# Patient Record
Sex: FEMALE | Race: WHITE | NOT HISPANIC OR LATINO | Employment: FULL TIME | URBAN - METROPOLITAN AREA
[De-identification: names, ages, dates, MRNs, and addresses within clinical notes are randomized per-mention and may not be internally consistent; named-entity substitution may affect disease eponyms.]

---

## 2019-08-13 ENCOUNTER — OFFICE VISIT (OUTPATIENT)
Dept: PODIATRY | Facility: CLINIC | Age: 59
End: 2019-08-13
Payer: COMMERCIAL

## 2019-08-13 VITALS
DIASTOLIC BLOOD PRESSURE: 81 MMHG | WEIGHT: 165 LBS | HEIGHT: 65 IN | BODY MASS INDEX: 27.49 KG/M2 | SYSTOLIC BLOOD PRESSURE: 130 MMHG | RESPIRATION RATE: 16 BRPM

## 2019-08-13 DIAGNOSIS — M20.41 HAMMER TOE OF RIGHT FOOT: ICD-10-CM

## 2019-08-13 DIAGNOSIS — Q66.52 CONGENITAL PES PLANUS OF LEFT FOOT: ICD-10-CM

## 2019-08-13 DIAGNOSIS — M79.672 PAIN IN BOTH FEET: ICD-10-CM

## 2019-08-13 DIAGNOSIS — M79.671 PAIN IN BOTH FEET: ICD-10-CM

## 2019-08-13 DIAGNOSIS — M21.969 ACQUIRED DEFORMITY OF FOOT, UNSPECIFIED LATERALITY: Primary | ICD-10-CM

## 2019-08-13 DIAGNOSIS — Q66.51 CONGENITAL PES PLANUS OF RIGHT FOOT: ICD-10-CM

## 2019-08-13 PROBLEM — M20.42 HAMMER TOE OF LEFT FOOT: Status: ACTIVE | Noted: 2019-08-13

## 2019-08-13 PROCEDURE — 20605 DRAIN/INJ JOINT/BURSA W/O US: CPT | Performed by: PODIATRIST

## 2019-08-13 PROCEDURE — L3000 FT INSERT UCB BERKELEY SHELL: HCPCS | Performed by: PODIATRIST

## 2019-08-13 PROCEDURE — 73630 X-RAY EXAM OF FOOT: CPT | Performed by: PODIATRIST

## 2019-08-13 PROCEDURE — 99203 OFFICE O/P NEW LOW 30 MIN: CPT | Performed by: PODIATRIST

## 2019-08-13 RX ORDER — METOPROLOL SUCCINATE 50 MG/1
50 TABLET, EXTENDED RELEASE ORAL DAILY
Refills: 5 | Status: ON HOLD | COMMUNITY
Start: 2019-07-01 | End: 2021-05-28 | Stop reason: SDUPTHER

## 2019-08-13 RX ORDER — MELOXICAM 7.5 MG/1
7.5 TABLET ORAL DAILY
Qty: 10 TABLET | Refills: 0 | Status: SHIPPED | OUTPATIENT
Start: 2019-08-13 | End: 2019-08-23

## 2019-08-13 RX ORDER — AMLODIPINE BESYLATE 5 MG/1
5 TABLET ORAL DAILY
Refills: 2 | Status: ON HOLD | COMMUNITY
Start: 2019-07-01 | End: 2021-05-28 | Stop reason: SDUPTHER

## 2019-08-13 RX ORDER — MELOXICAM 15 MG/1
1 TABLET ORAL DAILY PRN
COMMUNITY
Start: 2016-06-13 | End: 2021-05-28 | Stop reason: HOSPADM

## 2019-08-13 RX ORDER — LOSARTAN POTASSIUM 50 MG/1
50 TABLET ORAL DAILY
Refills: 2 | Status: ON HOLD | COMMUNITY
Start: 2019-07-01 | End: 2021-05-28 | Stop reason: SDUPTHER

## 2019-08-13 RX ORDER — ESCITALOPRAM OXALATE 10 MG/1
TABLET ORAL
COMMUNITY
Start: 2018-05-23 | End: 2021-05-28 | Stop reason: HOSPADM

## 2019-08-13 RX ORDER — LISINOPRIL 2.5 MG/1
TABLET ORAL
COMMUNITY
End: 2021-05-28 | Stop reason: HOSPADM

## 2019-08-13 RX ORDER — ALPRAZOLAM 0.5 MG/1
0.5 TABLET ORAL DAILY PRN
COMMUNITY
Start: 2013-10-28

## 2019-08-13 RX ORDER — FUROSEMIDE 20 MG/1
20 TABLET ORAL DAILY
COMMUNITY
End: 2021-05-28 | Stop reason: HOSPADM

## 2019-08-13 RX ORDER — NITROGLYCERIN 0.4 MG/1
0.4 TABLET SUBLINGUAL
COMMUNITY
Start: 2013-11-20

## 2019-08-13 RX ORDER — GLIMEPIRIDE 4 MG/1
4 TABLET ORAL 2 TIMES DAILY
COMMUNITY
End: 2021-05-28 | Stop reason: HOSPADM

## 2019-08-13 NOTE — PROGRESS NOTES
Assessment/Plan:  Metatarsalgia  Bursitis  Hammertoe formation  Pes planus  Pain upon ambulation  Hallux valgus deformity  Right foot pain    Plan  X-rays taken  Foot exam performed  Right 2nd MPJ arthrocentesis done  1 cc of Kenalog 10 injected into right 2nd MPJ without pain or complication  Patient be placed on Mobic  Patient has had her feet casted for custom molded foot orthotics  No problem-specific Assessment & Plan notes found for this encounter  Diagnoses and all orders for this visit:    Acquired deformity of foot, unspecified laterality  -     meloxicam (MOBIC) 7 5 mg tablet; Take 1 tablet (7 5 mg total) by mouth daily for 10 days    Pain in both feet    Congenital pes planus of right foot    Congenital pes planus of left foot    Hammer toe of right foot    Other orders  -     sitaGLIPtin-metFORMIN (JANUMET)  MG per tablet; Take 1 tablet by mouth  -     metoprolol succinate (TOPROL-XL) 50 mg 24 hr tablet; Take 50 mg by mouth daily  -     meloxicam (MOBIC) 15 mg tablet; Take 1 tablet by mouth daily as needed  -     losartan (COZAAR) 50 mg tablet; Take 50 mg by mouth daily  -     lisinopril (ZESTRIL) 2 5 mg tablet; Take by mouth  -     furosemide (LASIX) 20 mg tablet; Take 20 mg by mouth daily  -     glimepiride (AMARYL) 4 mg tablet; Take 4 mg by mouth 2 (two) times a day  -     ALPRAZolam (XANAX) 0 5 mg tablet; Take 0 5 mg by mouth daily as needed  -     amLODIPine (NORVASC) 5 mg tablet; Take 5 mg by mouth daily  -     aspirin 81 MG tablet; Take 81 mg by mouth daily  -     escitalopram (LEXAPRO) 10 mg tablet; TAKE 1 TABLET BY MOUTH AT BEDTIME  -     sitaGLIPtin (JANUVIA) 50 mg tablet; Take by mouth  -     nitroglycerin (NITROSTAT) 0 4 mg SL tablet; Place 0 4 mg under the tongue          Subjective:  Patient has pain in the ball of her right foot  No history of trauma  It has been ongoing for months  Patient has pain at the end of the day      No past medical history on file     No past surgical history on file  No Known Allergies      Current Outpatient Medications:     ALPRAZolam (XANAX) 0 5 mg tablet, Take 0 5 mg by mouth daily as needed, Disp: , Rfl:     aspirin 81 MG tablet, Take 81 mg by mouth daily, Disp: , Rfl:     escitalopram (LEXAPRO) 10 mg tablet, TAKE 1 TABLET BY MOUTH AT BEDTIME, Disp: , Rfl:     meloxicam (MOBIC) 15 mg tablet, Take 1 tablet by mouth daily as needed, Disp: , Rfl:     nitroglycerin (NITROSTAT) 0 4 mg SL tablet, Place 0 4 mg under the tongue, Disp: , Rfl:     sitaGLIPtin-metFORMIN (JANUMET)  MG per tablet, Take 1 tablet by mouth, Disp: , Rfl:     amLODIPine (NORVASC) 5 mg tablet, Take 5 mg by mouth daily, Disp: , Rfl: 2    furosemide (LASIX) 20 mg tablet, Take 20 mg by mouth daily, Disp: , Rfl:     glimepiride (AMARYL) 4 mg tablet, Take 4 mg by mouth 2 (two) times a day, Disp: , Rfl:     lisinopril (ZESTRIL) 2 5 mg tablet, Take by mouth, Disp: , Rfl:     losartan (COZAAR) 50 mg tablet, Take 50 mg by mouth daily, Disp: , Rfl: 2    meloxicam (MOBIC) 7 5 mg tablet, Take 1 tablet (7 5 mg total) by mouth daily for 10 days, Disp: 10 tablet, Rfl: 0    metoprolol succinate (TOPROL-XL) 50 mg 24 hr tablet, Take 50 mg by mouth daily, Disp: , Rfl: 5    sitaGLIPtin (JANUVIA) 50 mg tablet, Take by mouth, Disp: , Rfl:     Patient Active Problem List   Diagnosis    Acquired deformity of foot    Pain in both feet    Congenital pes planus of right foot    Congenital pes planus of left foot    Hammer toe of left foot          Patient ID: Vaughn Wu is a 61 y o  female  HPI    The following portions of the patient's history were reviewed and updated as appropriate:     family history is not on file  has no tobacco, alcohol, and drug history on file  Vitals:    08/13/19 1619   BP: 130/81   Resp: 16       Review of Systems      Objective:  Patient's shoes and socks removed     Foot Exam    General  General Appearance: appears stated age and healthy   Orientation: alert and oriented to person, place, and time   Affect: appropriate   Gait: antalgic       Right Foot/Ankle     Inspection and Palpation  Ecchymosis: none  Tenderness: great toe interphalangeal joint, great toe metatarsophalangeal joint and lesser metatarsophalangeal joints   Swelling: dorsum and metatarsals   Arch: pes planus  Hammertoes: fifth toe  Hallux valgus: yes  Hallux limitus: yes  Skin Exam: dry skin;     Neurovascular  Dorsalis pedis: 2+  Posterior tibial: 3+      Left Foot/Ankle      Inspection and Palpation  Ecchymosis: none  Tenderness: great toe metatarsophalangeal joint   Swelling: dorsum and metatarsals   Arch: pes planus  Hammertoes: fifth toe  Hallux valgus: yes  Hallux limitus: yes  Skin Exam: dry skin;     Neurovascular  Dorsalis pedis: 2+  Posterior tibial: 3+        Physical Exam   Constitutional: She is oriented to person, place, and time  She appears well-developed and well-nourished  Cardiovascular: Normal rate and regular rhythm  Pulses:       Dorsalis pedis pulses are 2+ on the right side, and 2+ on the left side  Posterior tibial pulses are 3+ on the right side, and 3+ on the left side  Musculoskeletal: She exhibits edema, tenderness and deformity  Patient is maximally pronated in stance and gait  There is hallux valgus deformity bilateral   Pain with palpation 2nd MPJ of the right foot  Possible positive Lauren sign 2nd right intermetatarsal space  X-ray  Subluxing 2nd MPJ  Hallux valgus deformity noted  No evidence of fracture or bony mass  Plantar flexed 2nd metatarsal noted   Feet:   Right Foot:   Skin Integrity: Positive for dry skin  Left Foot:   Skin Integrity: Positive for dry skin  Neurological: She is alert and oriented to person, place, and time  Skin: Skin is warm and dry  Capillary refill takes less than 2 seconds  Psychiatric: She has a normal mood and affect   Her behavior is normal  Judgment and thought content normal    Vitals reviewed          Procedures

## 2019-08-26 ENCOUNTER — OFFICE VISIT (OUTPATIENT)
Dept: PODIATRY | Facility: CLINIC | Age: 59
End: 2019-08-26
Payer: COMMERCIAL

## 2019-08-26 VITALS
DIASTOLIC BLOOD PRESSURE: 82 MMHG | HEIGHT: 65 IN | HEART RATE: 65 BPM | SYSTOLIC BLOOD PRESSURE: 132 MMHG | BODY MASS INDEX: 27.49 KG/M2 | WEIGHT: 165 LBS | RESPIRATION RATE: 16 BRPM

## 2019-08-26 DIAGNOSIS — M20.41 HAMMER TOE OF RIGHT FOOT: ICD-10-CM

## 2019-08-26 DIAGNOSIS — M79.671 RIGHT FOOT PAIN: ICD-10-CM

## 2019-08-26 DIAGNOSIS — M21.969 ACQUIRED DEFORMITY OF FOOT, UNSPECIFIED LATERALITY: ICD-10-CM

## 2019-08-26 DIAGNOSIS — G57.61 MORTON'S NEUROMA OF SECOND INTERSPACE OF RIGHT FOOT: Primary | ICD-10-CM

## 2019-08-26 PROCEDURE — 29540 STRAPPING ANKLE &/FOOT: CPT | Performed by: PODIATRIST

## 2019-08-26 PROCEDURE — 99212 OFFICE O/P EST SF 10 MIN: CPT | Performed by: PODIATRIST

## 2019-08-26 RX ORDER — ETODOLAC 400 MG/1
400 TABLET, FILM COATED ORAL 2 TIMES DAILY
Qty: 60 TABLET | Refills: 0 | Status: SHIPPED | OUTPATIENT
Start: 2019-08-26 | End: 2021-05-25

## 2019-08-26 NOTE — PROGRESS NOTES
Assessment/Plan:  Rule out neuroma right foot  Metatarsalgia  Bursitis  Pain upon ambulation  Plan  Patient declines injection therapy  We will change to Lodine  MRI ordered to rule out neuroma  Right arch bound a low dye arch strapping fashion    No problem-specific Assessment & Plan notes found for this encounter  Diagnoses and all orders for this visit:    Wes's neuroma of second interspace of right foot  -     MRI foot/forefoot toest right wo contrast; Future  -     etodolac (LODINE) 400 MG tablet; Take 1 tablet (400 mg total) by mouth 2 (two) times a day for 30 days    Acquired deformity of foot, unspecified laterality  -     etodolac (LODINE) 400 MG tablet; Take 1 tablet (400 mg total) by mouth 2 (two) times a day for 30 days    Right foot pain  -     etodolac (LODINE) 400 MG tablet; Take 1 tablet (400 mg total) by mouth 2 (two) times a day for 30 days          Subjective:  Patient has continued ongoing pain of the right foot  Patient has pain in around the 2nd 3rd toes of the right foot  No history of trauma    No past medical history on file  No past surgical history on file      No Known Allergies      Current Outpatient Medications:     ALPRAZolam (XANAX) 0 5 mg tablet, Take 0 5 mg by mouth daily as needed, Disp: , Rfl:     amLODIPine (NORVASC) 5 mg tablet, Take 5 mg by mouth daily, Disp: , Rfl: 2    aspirin 81 MG tablet, Take 81 mg by mouth daily, Disp: , Rfl:     escitalopram (LEXAPRO) 10 mg tablet, TAKE 1 TABLET BY MOUTH AT BEDTIME, Disp: , Rfl:     etodolac (LODINE) 400 MG tablet, Take 1 tablet (400 mg total) by mouth 2 (two) times a day for 30 days, Disp: 60 tablet, Rfl: 0    furosemide (LASIX) 20 mg tablet, Take 20 mg by mouth daily, Disp: , Rfl:     glimepiride (AMARYL) 4 mg tablet, Take 4 mg by mouth 2 (two) times a day, Disp: , Rfl:     lisinopril (ZESTRIL) 2 5 mg tablet, Take by mouth, Disp: , Rfl:     losartan (COZAAR) 50 mg tablet, Take 50 mg by mouth daily, Disp: , Rfl: 2    meloxicam (MOBIC) 15 mg tablet, Take 1 tablet by mouth daily as needed, Disp: , Rfl:     meloxicam (MOBIC) 7 5 mg tablet, Take 1 tablet (7 5 mg total) by mouth daily for 10 days, Disp: 10 tablet, Rfl: 0    metoprolol succinate (TOPROL-XL) 50 mg 24 hr tablet, Take 50 mg by mouth daily, Disp: , Rfl: 5    nitroglycerin (NITROSTAT) 0 4 mg SL tablet, Place 0 4 mg under the tongue, Disp: , Rfl:     sitaGLIPtin (JANUVIA) 50 mg tablet, Take by mouth, Disp: , Rfl:     sitaGLIPtin-metFORMIN (JANUMET)  MG per tablet, Take 1 tablet by mouth, Disp: , Rfl:     Patient Active Problem List   Diagnosis    Acquired deformity of foot    Pain in both feet    Congenital pes planus of right foot    Congenital pes planus of left foot    Hammer toe of left foot          Patient ID: Jf Ty is a 61 y o  female  HPI    The following portions of the patient's history were reviewed and updated as appropriate:     family history is not on file  has no tobacco, alcohol, and drug history on file  Vitals:    08/26/19 1624   BP: 132/82   Pulse: 65   Resp: 16       Review of Systems      Objective:  Patient's shoes and socks removed     Foot ExamPhysical Exam       Foot Exam     General  General Appearance: appears stated age and healthy   Orientation: alert and oriented to person, place, and time   Affect: appropriate   Gait: antalgic         Right Foot/Ankle      Inspection and Palpation  Ecchymosis: none  Tenderness: great toe interphalangeal joint, great toe metatarsophalangeal joint and lesser metatarsophalangeal joints   Swelling: dorsum and metatarsals   Arch: pes planus  Hammertoes: fifth toe  Hallux valgus: yes  Hallux limitus: yes  Skin Exam: dry skin;      Neurovascular  Dorsalis pedis: 2+  Posterior tibial: 3+        Left Foot/Ankle       Inspection and Palpation  Ecchymosis: none  Tenderness: great toe metatarsophalangeal joint   Swelling: dorsum and metatarsals   Arch: pes planus  Hammertoes: fifth toe  Hallux valgus: yes  Hallux limitus: yes  Skin Exam: dry skin;      Neurovascular  Dorsalis pedis: 2+  Posterior tibial: 3+           Physical Exam   Constitutional: She is oriented to person, place, and time  She appears well-developed and well-nourished  Cardiovascular: Normal rate and regular rhythm  Pulses:       Dorsalis pedis pulses are 2+ on the right side, and 2+ on the left side  Posterior tibial pulses are 3+ on the right side, and 3+ on the left side  Musculoskeletal: She exhibits edema, tenderness and deformity  Patient is maximally pronated in stance and gait  There is hallux valgus deformity bilateral   Pain with palpation 2nd MPJ of the right foot  Possible positive Lauren sign 2nd right intermetatarsal space  X-ray  Subluxing 2nd MPJ  Hallux valgus deformity noted  No evidence of fracture or bony mass  Plantar flexed 2nd metatarsal noted   Feet:   Right Foot:   Skin Integrity: Positive for dry skin  Left Foot:   Skin Integrity: Positive for dry skin  Neurological: She is alert and oriented to person, place, and time  Skin: Skin is warm and dry  Capillary refill takes less than 2 seconds  Psychiatric: She has a normal mood and affect  Her behavior is normal  Judgment and thought content normal    Vitals reviewed              Procedures

## 2019-09-13 ENCOUNTER — OFFICE VISIT (OUTPATIENT)
Dept: PODIATRY | Facility: CLINIC | Age: 59
End: 2019-09-13
Payer: COMMERCIAL

## 2019-09-13 VITALS
DIASTOLIC BLOOD PRESSURE: 82 MMHG | HEIGHT: 65 IN | WEIGHT: 165 LBS | HEART RATE: 73 BPM | RESPIRATION RATE: 17 BRPM | SYSTOLIC BLOOD PRESSURE: 138 MMHG | BODY MASS INDEX: 27.49 KG/M2

## 2019-09-13 DIAGNOSIS — G57.61 MORTON'S NEUROMA OF SECOND INTERSPACE OF RIGHT FOOT: Primary | ICD-10-CM

## 2019-09-13 DIAGNOSIS — M79.671 RIGHT FOOT PAIN: ICD-10-CM

## 2019-09-13 DIAGNOSIS — M21.969 ACQUIRED DEFORMITY OF FOOT, UNSPECIFIED LATERALITY: ICD-10-CM

## 2019-09-13 DIAGNOSIS — M20.41 HAMMER TOE OF RIGHT FOOT: ICD-10-CM

## 2019-09-13 PROCEDURE — 99212 OFFICE O/P EST SF 10 MIN: CPT | Performed by: PODIATRIST

## 2019-09-13 PROCEDURE — 64455 NJX AA&/STRD PLTR COM DG NRV: CPT | Performed by: PODIATRIST

## 2019-09-13 PROCEDURE — 73620 X-RAY EXAM OF FOOT: CPT | Performed by: PODIATRIST

## 2019-09-13 RX ORDER — ETODOLAC 400 MG/1
400 TABLET, FILM COATED ORAL 2 TIMES DAILY
Qty: 20 TABLET | Refills: 0 | Status: SHIPPED | OUTPATIENT
Start: 2019-09-13 | End: 2019-09-23

## 2019-09-13 NOTE — PROGRESS NOTES
Assessment/Plan:  Rule out neuroma right foot  Metatarsalgia  Bursitis  Pain upon ambulation        Plan  Today diagnostic nerve block performed  2 cc 2 percent lidocaine 1 cc Kenalog 10 injected into right 2nd intermetatarsal space without pain or complication  We will change to Lodine  MRI ordered to rule out neuroma  Right arch bound a low dye arch strapping fashion     No problem-specific Assessment & Plan notes found for this encounter          Diagnoses and all orders for this visit:     Harvey's neuroma of second interspace of right foot  -     MRI foot/forefoot toest right wo contrast; Future  -     etodolac (LODINE) 400 MG tablet; Take 1 tablet (400 mg total) by mouth 2 (two) times a day for 30 days     Acquired deformity of foot, unspecified laterality  -     etodolac (LODINE) 400 MG tablet; Take 1 tablet (400 mg total) by mouth 2 (two) times a day for 30 days     Right foot pain  -     etodolac (LODINE) 400 MG tablet; Take 1 tablet (400 mg total) by mouth 2 (two) times a day for 30 days            Subjective:  Patient has continued ongoing pain of the right foot  Patient has pain in around the 2nd 3rd toes of the right foot    No history of trauma     No past medical history on file      No past surgical history on file      No Known Allergies        Current Outpatient Medications:     ALPRAZolam (XANAX) 0 5 mg tablet, Take 0 5 mg by mouth daily as needed, Disp: , Rfl:     amLODIPine (NORVASC) 5 mg tablet, Take 5 mg by mouth daily, Disp: , Rfl: 2    aspirin 81 MG tablet, Take 81 mg by mouth daily, Disp: , Rfl:     escitalopram (LEXAPRO) 10 mg tablet, TAKE 1 TABLET BY MOUTH AT BEDTIME, Disp: , Rfl:     etodolac (LODINE) 400 MG tablet, Take 1 tablet (400 mg total) by mouth 2 (two) times a day for 30 days, Disp: 60 tablet, Rfl: 0    furosemide (LASIX) 20 mg tablet, Take 20 mg by mouth daily, Disp: , Rfl:     glimepiride (AMARYL) 4 mg tablet, Take 4 mg by mouth 2 (two) times a day, Disp: , Rfl:     lisinopril (ZESTRIL) 2 5 mg tablet, Take by mouth, Disp: , Rfl:     losartan (COZAAR) 50 mg tablet, Take 50 mg by mouth daily, Disp: , Rfl: 2    meloxicam (MOBIC) 15 mg tablet, Take 1 tablet by mouth daily as needed, Disp: , Rfl:     meloxicam (MOBIC) 7 5 mg tablet, Take 1 tablet (7 5 mg total) by mouth daily for 10 days, Disp: 10 tablet, Rfl: 0    metoprolol succinate (TOPROL-XL) 50 mg 24 hr tablet, Take 50 mg by mouth daily, Disp: , Rfl: 5    nitroglycerin (NITROSTAT) 0 4 mg SL tablet, Place 0 4 mg under the tongue, Disp: , Rfl:     sitaGLIPtin (JANUVIA) 50 mg tablet, Take by mouth, Disp: , Rfl:     sitaGLIPtin-metFORMIN (JANUMET)  MG per tablet, Take 1 tablet by mouth, Disp: , Rfl:          Patient Active Problem List   Diagnosis    Acquired deformity of foot    Pain in both feet    Congenital pes planus of right foot    Congenital pes planus of left foot    Hammer toe of left foot             Patient ID: Kami Ragland is a 61 y o  female      HPI     The following portions of the patient's history were reviewed and updated as appropriate:      family history is not on file        has no tobacco, alcohol, and drug history on file          Vitals:     08/26/19 1624   BP: 132/82   Pulse: 65   Resp: 16         Review of Systems       Objective:  Patient's shoes and socks removed     Foot ExamPhysical Exam        Foot Exam     General  General Appearance: appears stated age and healthy   Orientation: alert and oriented to person, place, and time   Affect: appropriate   Gait: antalgic         Right Foot/Ankle      Inspection and Palpation  Ecchymosis: none  Tenderness: great toe interphalangeal joint, great toe metatarsophalangeal joint and lesser metatarsophalangeal joints   Swelling: dorsum and metatarsals   Arch: pes planus  Hammertoes: fifth toe  Hallux valgus: yes  Hallux limitus: yes  Skin Exam: dry skin;      Neurovascular  Dorsalis pedis: 2+  Posterior tibial: 3+        Left Foot/Ankle       Inspection and Palpation  Ecchymosis: none  Tenderness: great toe metatarsophalangeal joint   Swelling: dorsum and metatarsals   Arch: pes planus  Hammertoes: fifth toe  Hallux valgus: yes  Hallux limitus: yes  Skin Exam: dry skin;      Neurovascular  Dorsalis pedis: 2+  Posterior tibial: 3+           Physical Exam   Constitutional: She is oriented to person, place, and time  She appears well-developed and well-nourished  Cardiovascular: Normal rate and regular rhythm  Pulses:       Dorsalis pedis pulses are 2+ on the right side, and 2+ on the left side         Posterior tibial pulses are 3+ on the right side, and 3+ on the left side  Musculoskeletal: She exhibits edema, tenderness and deformity    Patient is maximally pronated in stance and gait   There is hallux valgus deformity bilateral   Pain with palpation 2nd MPJ of the right foot   Possible positive Lauren sign 2nd right intermetatarsal space  X-ray   Subluxing 2nd MPJ   Hallux valgus deformity noted   No evidence of fracture or bony mass   Plantar flexed 2nd metatarsal noted   Feet:   Right Foot:   Skin Integrity: Positive for dry skin  Left Foot:   Skin Integrity: Positive for dry skin  Neurological: She is alert and oriented to person, place, and time  Skin: Skin is warm and dry  Capillary refill takes less than 2 seconds  Psychiatric: She has a normal mood and affect   Her behavior is normal  Judgment and thought content normal    Vitals reviewed

## 2021-05-25 ENCOUNTER — HOSPITAL ENCOUNTER (INPATIENT)
Facility: HOSPITAL | Age: 61
LOS: 3 days | Discharge: HOME | DRG: 305 | End: 2021-05-28
Attending: EMERGENCY MEDICINE | Admitting: INTERNAL MEDICINE
Payer: COMMERCIAL

## 2021-05-25 ENCOUNTER — APPOINTMENT (EMERGENCY)
Dept: CT IMAGING | Facility: HOSPITAL | Age: 61
DRG: 305 | End: 2021-05-25
Payer: COMMERCIAL

## 2021-05-25 DIAGNOSIS — R47.89 WORD FINDING DIFFICULTY: ICD-10-CM

## 2021-05-25 DIAGNOSIS — R73.9 HYPERGLYCEMIA: ICD-10-CM

## 2021-05-25 DIAGNOSIS — I10 HTN (HYPERTENSION): ICD-10-CM

## 2021-05-25 DIAGNOSIS — R42 DIZZINESS: ICD-10-CM

## 2021-05-25 DIAGNOSIS — E11.65 UNCONTROLLED TYPE 2 DIABETES MELLITUS WITH HYPERGLYCEMIA (HCC): Primary | ICD-10-CM

## 2021-05-25 DIAGNOSIS — I47.2 NONSUSTAINED PAROXYSMAL VENTRICULAR TACHYCARDIA (HCC): ICD-10-CM

## 2021-05-25 DIAGNOSIS — I16.0 HYPERTENSIVE URGENCY: ICD-10-CM

## 2021-05-25 DIAGNOSIS — R94.31 PROLONGED Q-T INTERVAL ON ECG: ICD-10-CM

## 2021-05-25 PROBLEM — IMO0002 UNCONTROLLED TYPE 2 DIABETES MELLITUS: Status: ACTIVE | Noted: 2021-05-25

## 2021-05-25 PROBLEM — E87.1 HYPONATREMIA: Status: ACTIVE | Noted: 2021-05-25

## 2021-05-25 LAB
ALBUMIN SERPL BCP-MCNC: 3.9 G/DL (ref 3.5–5)
ALP SERPL-CCNC: 145 U/L (ref 46–116)
ALT SERPL W P-5'-P-CCNC: 35 U/L (ref 12–78)
ANION GAP SERPL CALCULATED.3IONS-SCNC: 15 MMOL/L (ref 4–13)
ANION GAP SERPL CALCULATED.3IONS-SCNC: 9 MMOL/L (ref 4–13)
AST SERPL W P-5'-P-CCNC: 27 U/L (ref 5–45)
BACTERIA UR QL AUTO: NORMAL /HPF
BASOPHILS # BLD AUTO: 0.02 THOUSANDS/ΜL (ref 0–0.1)
BASOPHILS NFR BLD AUTO: 0 % (ref 0–1)
BILIRUB SERPL-MCNC: 0.52 MG/DL (ref 0.2–1)
BILIRUB UR QL STRIP: NEGATIVE
BUN SERPL-MCNC: 16 MG/DL (ref 5–25)
BUN SERPL-MCNC: 20 MG/DL (ref 5–25)
CALCIUM SERPL-MCNC: 8.7 MG/DL (ref 8.3–10.1)
CALCIUM SERPL-MCNC: 9.2 MG/DL (ref 8.3–10.1)
CHLORIDE SERPL-SCNC: 102 MMOL/L (ref 100–108)
CHLORIDE SERPL-SCNC: 94 MMOL/L (ref 100–108)
CLARITY UR: CLEAR
CO2 SERPL-SCNC: 24 MMOL/L (ref 21–32)
CO2 SERPL-SCNC: 24 MMOL/L (ref 21–32)
COLOR UR: YELLOW
CREAT SERPL-MCNC: 1 MG/DL (ref 0.6–1.3)
CREAT SERPL-MCNC: 1.12 MG/DL (ref 0.6–1.3)
EOSINOPHIL # BLD AUTO: 0.1 THOUSAND/ΜL (ref 0–0.61)
EOSINOPHIL NFR BLD AUTO: 1 % (ref 0–6)
ERYTHROCYTE [DISTWIDTH] IN BLOOD BY AUTOMATED COUNT: 12.6 % (ref 11.6–15.1)
GFR SERPL CREATININE-BSD FRML MDRD: 53 ML/MIN/1.73SQ M
GFR SERPL CREATININE-BSD FRML MDRD: 61 ML/MIN/1.73SQ M
GLUCOSE SERPL-MCNC: 311 MG/DL (ref 65–140)
GLUCOSE SERPL-MCNC: 373 MG/DL (ref 65–140)
GLUCOSE SERPL-MCNC: 534 MG/DL (ref 65–140)
GLUCOSE UR STRIP-MCNC: ABNORMAL MG/DL
HCT VFR BLD AUTO: 47.3 % (ref 34.8–46.1)
HGB BLD-MCNC: 16.1 G/DL (ref 11.5–15.4)
HGB UR QL STRIP.AUTO: NEGATIVE
IMM GRANULOCYTES # BLD AUTO: 0.04 THOUSAND/UL (ref 0–0.2)
IMM GRANULOCYTES NFR BLD AUTO: 1 % (ref 0–2)
KETONES UR STRIP-MCNC: NEGATIVE MG/DL
LEUKOCYTE ESTERASE UR QL STRIP: NEGATIVE
LYMPHOCYTES # BLD AUTO: 2.08 THOUSANDS/ΜL (ref 0.6–4.47)
LYMPHOCYTES NFR BLD AUTO: 27 % (ref 14–44)
MCH RBC QN AUTO: 29.9 PG (ref 26.8–34.3)
MCHC RBC AUTO-ENTMCNC: 34 G/DL (ref 31.4–37.4)
MCV RBC AUTO: 88 FL (ref 82–98)
MONOCYTES # BLD AUTO: 0.41 THOUSAND/ΜL (ref 0.17–1.22)
MONOCYTES NFR BLD AUTO: 5 % (ref 4–12)
NEUTROPHILS # BLD AUTO: 5.18 THOUSANDS/ΜL (ref 1.85–7.62)
NEUTS SEG NFR BLD AUTO: 66 % (ref 43–75)
NITRITE UR QL STRIP: NEGATIVE
NON-SQ EPI CELLS URNS QL MICRO: NORMAL /HPF
NRBC BLD AUTO-RTO: 0 /100 WBCS
PH UR STRIP.AUTO: 6 [PH] (ref 4.5–8)
PLATELET # BLD AUTO: 197 THOUSANDS/UL (ref 149–390)
PLATELET # BLD AUTO: 238 THOUSANDS/UL (ref 149–390)
PMV BLD AUTO: 10.1 FL (ref 8.9–12.7)
PMV BLD AUTO: 10.5 FL (ref 8.9–12.7)
POTASSIUM SERPL-SCNC: 3.5 MMOL/L (ref 3.5–5.3)
POTASSIUM SERPL-SCNC: 4.1 MMOL/L (ref 3.5–5.3)
PROT SERPL-MCNC: 8.3 G/DL (ref 6.4–8.2)
PROT UR STRIP-MCNC: ABNORMAL MG/DL
RBC # BLD AUTO: 5.39 MILLION/UL (ref 3.81–5.12)
RBC #/AREA URNS AUTO: NORMAL /HPF
SODIUM SERPL-SCNC: 133 MMOL/L (ref 136–145)
SODIUM SERPL-SCNC: 135 MMOL/L (ref 136–145)
SP GR UR STRIP.AUTO: 1.01 (ref 1–1.03)
TROPONIN I SERPL-MCNC: <0.02 NG/ML
UROBILINOGEN UR QL STRIP.AUTO: 0.2 E.U./DL
WBC # BLD AUTO: 7.83 THOUSAND/UL (ref 4.31–10.16)
WBC #/AREA URNS AUTO: NORMAL /HPF

## 2021-05-25 PROCEDURE — 99223 1ST HOSP IP/OBS HIGH 75: CPT | Performed by: INTERNAL MEDICINE

## 2021-05-25 PROCEDURE — 82948 REAGENT STRIP/BLOOD GLUCOSE: CPT

## 2021-05-25 PROCEDURE — 99285 EMERGENCY DEPT VISIT HI MDM: CPT | Performed by: EMERGENCY MEDICINE

## 2021-05-25 PROCEDURE — 70496 CT ANGIOGRAPHY HEAD: CPT

## 2021-05-25 PROCEDURE — 99285 EMERGENCY DEPT VISIT HI MDM: CPT

## 2021-05-25 PROCEDURE — 83605 ASSAY OF LACTIC ACID: CPT | Performed by: PHYSICIAN ASSISTANT

## 2021-05-25 PROCEDURE — 70498 CT ANGIOGRAPHY NECK: CPT

## 2021-05-25 PROCEDURE — G1004 CDSM NDSC: HCPCS

## 2021-05-25 PROCEDURE — 80048 BASIC METABOLIC PNL TOTAL CA: CPT | Performed by: PHYSICIAN ASSISTANT

## 2021-05-25 PROCEDURE — 80053 COMPREHEN METABOLIC PANEL: CPT | Performed by: EMERGENCY MEDICINE

## 2021-05-25 PROCEDURE — 93005 ELECTROCARDIOGRAM TRACING: CPT

## 2021-05-25 PROCEDURE — 81001 URINALYSIS AUTO W/SCOPE: CPT

## 2021-05-25 PROCEDURE — 85025 COMPLETE CBC W/AUTO DIFF WBC: CPT | Performed by: EMERGENCY MEDICINE

## 2021-05-25 PROCEDURE — 85049 AUTOMATED PLATELET COUNT: CPT | Performed by: PHYSICIAN ASSISTANT

## 2021-05-25 PROCEDURE — 36415 COLL VENOUS BLD VENIPUNCTURE: CPT | Performed by: EMERGENCY MEDICINE

## 2021-05-25 PROCEDURE — 84484 ASSAY OF TROPONIN QUANT: CPT | Performed by: EMERGENCY MEDICINE

## 2021-05-25 RX ORDER — ACETAMINOPHEN 325 MG/1
650 TABLET ORAL EVERY 4 HOURS PRN
Status: DISCONTINUED | OUTPATIENT
Start: 2021-05-25 | End: 2021-05-28 | Stop reason: HOSPADM

## 2021-05-25 RX ORDER — METOPROLOL SUCCINATE 50 MG/1
50 TABLET, EXTENDED RELEASE ORAL ONCE
Status: COMPLETED | OUTPATIENT
Start: 2021-05-25 | End: 2021-05-25

## 2021-05-25 RX ORDER — LOSARTAN POTASSIUM 50 MG/1
50 TABLET ORAL ONCE
Status: COMPLETED | OUTPATIENT
Start: 2021-05-25 | End: 2021-05-25

## 2021-05-25 RX ORDER — ASPIRIN 81 MG/1
81 TABLET, CHEWABLE ORAL DAILY
Status: DISCONTINUED | OUTPATIENT
Start: 2021-05-26 | End: 2021-05-28 | Stop reason: HOSPADM

## 2021-05-25 RX ORDER — AMLODIPINE BESYLATE 5 MG/1
5 TABLET ORAL ONCE
Status: COMPLETED | OUTPATIENT
Start: 2021-05-25 | End: 2021-05-25

## 2021-05-25 RX ORDER — ATORVASTATIN CALCIUM 40 MG/1
40 TABLET, FILM COATED ORAL EVERY EVENING
Status: DISCONTINUED | OUTPATIENT
Start: 2021-05-25 | End: 2021-05-26

## 2021-05-25 RX ADMIN — LOSARTAN POTASSIUM 50 MG: 50 TABLET, FILM COATED ORAL at 18:25

## 2021-05-25 RX ADMIN — INSULIN LISPRO 3 UNITS: 100 INJECTION, SOLUTION INTRAVENOUS; SUBCUTANEOUS at 22:51

## 2021-05-25 RX ADMIN — ATORVASTATIN CALCIUM 40 MG: 40 TABLET, FILM COATED ORAL at 22:43

## 2021-05-25 RX ADMIN — AMLODIPINE BESYLATE 5 MG: 5 TABLET ORAL at 18:26

## 2021-05-25 RX ADMIN — IOHEXOL 85 ML: 350 INJECTION, SOLUTION INTRAVENOUS at 20:07

## 2021-05-25 RX ADMIN — SODIUM CHLORIDE 1000 ML: 0.9 INJECTION, SOLUTION INTRAVENOUS at 21:06

## 2021-05-25 RX ADMIN — INSULIN HUMAN 10 UNITS: 100 INJECTION, SOLUTION PARENTERAL at 21:02

## 2021-05-25 RX ADMIN — METOPROLOL SUCCINATE 50 MG: 50 TABLET, EXTENDED RELEASE ORAL at 18:26

## 2021-05-25 NOTE — ED PROVIDER NOTES
History  Chief Complaint   Patient presents with    Dizziness     PT presents w/dizziness starting today approx 13:30  thought process felt strange     HPI     60-year-old female presenting for evaluation of dizziness and word-finding difficulty  Patient was at work at around 1:30 p m  When she began to feel dizzy which she describes as feeling lightheaded  Denies sensation of the room spinning  Accompanied by difficulty speaking  Reports that she was able to hear her coworkers talking to her and understand with a worsening, but could not find the words to respond  Her son denies hearing any slurred speech at the time  No facial droop, focal weakness, or sensory deficits  No history of stroke or TIA  No headache  She with the exception of lightheadedness, denies difficulty walking  Of note, patient has a history of hypertension and is on 4 antihypertensive agents  Reports that she has been out of all of her blood pressure medication and her diabetes medicine for the last week  Prior to Admission Medications   Prescriptions Last Dose Informant Patient Reported? Taking?    ALPRAZolam (XANAX) 0 5 mg tablet Past Week at Unknown time  Yes Yes   Sig: Take 0 5 mg by mouth daily as needed   amLODIPine (NORVASC) 5 mg tablet Past Week at Unknown time  Yes Yes   Sig: Take 5 mg by mouth daily   aspirin 81 MG tablet Past Week at Unknown time  Yes Yes   Sig: Take 81 mg by mouth daily   escitalopram (LEXAPRO) 10 mg tablet Past Week at Unknown time  Yes Yes   Sig: TAKE 1 TABLET BY MOUTH AT BEDTIME   furosemide (LASIX) 20 mg tablet Not Taking at Unknown time  Yes No   Sig: Take 20 mg by mouth daily   glimepiride (AMARYL) 4 mg tablet Past Week at Unknown time  Yes Yes   Sig: Take 4 mg by mouth 2 (two) times a day   lisinopril (ZESTRIL) 2 5 mg tablet Past Week at Unknown time  Yes Yes   Sig: Take by mouth   losartan (COZAAR) 50 mg tablet Past Week at Unknown time  Yes Yes   Sig: Take 50 mg by mouth daily   meloxicam (MOBIC) 15 mg tablet Not Taking at Unknown time  Yes No   Sig: Take 1 tablet by mouth daily as needed   metoprolol succinate (TOPROL-XL) 50 mg 24 hr tablet Past Week at Unknown time  Yes Yes   Sig: Take 50 mg by mouth daily   nitroglycerin (NITROSTAT) 0 4 mg SL tablet Past Week at Unknown time  Yes Yes   Sig: Place 0 4 mg under the tongue   sitaGLIPtin (JANUVIA) 50 mg tablet Past Week at Unknown time  Yes Yes   Sig: Take by mouth   sitaGLIPtin-metFORMIN (JANUMET)  MG per tablet Past Week at Unknown time  Yes Yes   Sig: Take 1 tablet by mouth      Facility-Administered Medications: None       Past Medical History:   Diagnosis Date    Diabetes mellitus ([facility] [address] )     Heart failure ([facility] )     Hypertension        History reviewed  No pertinent surgical history  History reviewed  No pertinent family history  I have reviewed and agree with the history as documented  E-Cigarette/Vaping     E-Cigarette/Vaping Substances     Social History     Tobacco Use    Smoking status: Former Smoker    Smokeless tobacco: Former User   Substance Use Topics    Alcohol use: Yes    Drug use: Never       Review of Systems   Constitutional: Negative for chills and fever  HENT: Negative for congestion  Eyes: Negative for visual disturbance  Respiratory: Negative for cough and shortness of breath  Cardiovascular: Negative for chest pain and leg swelling  Gastrointestinal: Negative for abdominal pain, diarrhea, nausea and vomiting  Genitourinary: Negative for dysuria and frequency  Musculoskeletal: Negative for arthralgias, back pain, neck pain and neck stiffness  Skin: Negative for rash  Neurological: Positive for light-headedness  Negative for weakness, numbness and headaches  Word-finding difficulty, resolved   Psychiatric/Behavioral: Negative for agitation, behavioral problems and confusion  Physical Exam  Physical Exam  Constitutional:       General: She is not in acute distress  Appearance: She is well-developed  She is not diaphoretic  HENT:      Head: Normocephalic and atraumatic  Right Ear: External ear normal       Left Ear: External ear normal       Nose: Nose normal    Eyes:      Extraocular Movements: Extraocular movements intact  Conjunctiva/sclera: Conjunctivae normal       Pupils: Pupils are equal, round, and reactive to light  Neck:      Musculoskeletal: Normal range of motion and neck supple  Cardiovascular:      Rate and Rhythm: Normal rate and regular rhythm  Pulses: Normal pulses  Heart sounds: Normal heart sounds  No murmur  No friction rub  No gallop  Pulmonary:      Effort: Pulmonary effort is normal  No respiratory distress  Breath sounds: Normal breath sounds  No wheezing or rales  Abdominal:      General: Bowel sounds are normal  There is no distension  Palpations: Abdomen is soft  Tenderness: There is no abdominal tenderness  There is no guarding  Musculoskeletal: Normal range of motion  General: No deformity  Right lower leg: No edema  Left lower leg: No edema  Skin:     General: Skin is warm and dry  Neurological:      Mental Status: She is alert and oriented to person, place, and time  Motor: No abnormal muscle tone  Comments: Face symmetric, tongue midline, 5/5 strength in the proximal and distal upper and lower extremities bilaterally with intact sensation to light touch throughout  CN II-XII intact  Normal finger-to-nose, rapid alternating movements, and heel-to-shin bilaterally  Normal speech, normal gait  No pronator drift        Psychiatric:         Mood and Affect: Mood normal          Vital Signs  ED Triage Vitals   Temperature Pulse Respirations Blood Pressure SpO2   05/25/21 1650 05/25/21 1650 05/25/21 1650 05/25/21 1650 05/25/21 1650   97 8 °F (36 6 °C) 97 18 (!) 191/117 98 %      Temp Source Heart Rate Source Patient Position - Orthostatic VS BP Location FiO2 (%)   05/25/21 1650 05/25/21 1900 05/25/21 1650 05/25/21 1650 --   Oral Monitor Lying Left arm       Pain Score       05/25/21 2020       No Pain           Vitals:    05/25/21 2212 05/25/21 2213 05/25/21 2300 05/26/21 0000   BP: 135/73 134/77 119/67 92/52   Pulse: 71 69 71 70   Patient Position - Orthostatic VS: Sitting Standing Lying Lying         Visual Acuity      ED Medications  Medications   aspirin chewable tablet 81 mg (has no administration in time range)   acetaminophen (TYLENOL) tablet 650 mg (has no administration in time range)   atorvastatin (LIPITOR) tablet 40 mg (40 mg Oral Given 5/25/21 2243)   enoxaparin (LOVENOX) subcutaneous injection 40 mg (has no administration in time range)   insulin lispro (HumaLOG) 100 units/mL subcutaneous injection 1-5 Units (has no administration in time range)   insulin lispro (HumaLOG) 100 units/mL subcutaneous injection 1-5 Units (3 Units Subcutaneous Given 5/25/21 2251)   metoprolol succinate (TOPROL-XL) 24 hr tablet 50 mg (50 mg Oral Given 5/25/21 1826)   amLODIPine (NORVASC) tablet 5 mg (5 mg Oral Given 5/25/21 1826)   losartan (COZAAR) tablet 50 mg (50 mg Oral Given 5/25/21 1825)   iohexol (OMNIPAQUE) 350 MG/ML injection (SINGLE-DOSE) 85 mL (85 mL Intravenous Given 5/25/21 2007)   insulin regular (HumuLIN R,NovoLIN R) injection 10 Units (10 Units Subcutaneous Given 5/25/21 2102)   sodium chloride 0 9 % bolus 1,000 mL (1,000 mL Intravenous New Bag 5/25/21 2106)       Diagnostic Studies  Results Reviewed     Procedure Component Value Units Date/Time    Comprehensive metabolic panel [[id number]]  (Abnormal) Collected: 05/25/21 1818    Lab Status: Final result Specimen: Blood from Arm, Right Updated: 05/25/21 1951     Sodium 133 mmol/L      Potassium 4 1 mmol/L      Chloride 94 mmol/L      CO2 24 mmol/L      ANION GAP 15 mmol/L      BUN 20 mg/dL      Creatinine 1 12 mg/dL      Glucose 534 mg/dL      Calcium 9 2 mg/dL      AST 27 U/L      ALT 35 U/L      Alkaline Phosphatase 145 U/L Total Protein 8 3 g/dL      Albumin 3 9 g/dL      Total Bilirubin 0 52 mg/dL      eGFR 53 ml/min/1 73sq m     Narrative:      [place] guidelines for Chronic Kidney Disease (CKD):     Stage 1 with normal or high GFR (GFR > 90 mL/min/1 73 square meters)    Stage 2 Mild CKD (GFR = 60-89 mL/min/1 73 square meters)    Stage 3A Moderate CKD (GFR = 45-59 mL/min/1 73 square meters)    Stage 3B Moderate CKD (GFR = 30-44 mL/min/1 73 square meters)    Stage 4 Severe CKD (GFR = 15-29 mL/min/1 73 square meters)    Stage 5 End Stage CKD (GFR <15 mL/min/1 73 square meters)  Note: GFR calculation is accurate only with a steady state creatinine    Urine Microscopic [[id number]]  (Normal) Collected: 05/25/21 1818    Lab Status: Final result Specimen: Urine, Clean Catch Updated: 05/25/21 1910     RBC, UA None Seen /hpf      WBC, UA 0-1 /hpf      Epithelial Cells Occasional /hpf      Bacteria, UA None Seen /hpf     Troponin I [[id number]]  (Normal) Collected: 05/25/21 1818    Lab Status: Final result Specimen: Blood from Arm, Right Updated: 05/25/21 1855     Troponin I <0 02 ng/mL     CBC and differential [[id number]]  (Abnormal) Collected: 05/25/21 1818    Lab Status: Final result Specimen: Blood from Arm, Right Updated: 05/25/21 1830     WBC 7 83 Thousand/uL      RBC 5 39 Million/uL      Hemoglobin 16 1 g/dL      Hematocrit 47 3 %      MCV 88 fL      MCH 29 9 pg      MCHC 34 0 g/dL      RDW 12 6 %      MPV 10 5 fL      Platelets 502 Thousands/uL      nRBC 0 /100 WBCs      Neutrophils Relative 66 %      Immat GRANS % 1 %      Lymphocytes Relative 27 %      Monocytes Relative 5 %      Eosinophils Relative 1 %      Basophils Relative 0 %      Neutrophils Absolute 5 18 Thousands/µL      Immature Grans Absolute 0 04 Thousand/uL      Lymphocytes Absolute 2 08 Thousands/µL      Monocytes Absolute 0 41 Thousand/µL      Eosinophils Absolute 0 10 Thousand/µL      Basophils Absolute 0 02 Thousands/µL     Urine Macroscopic, POC [[id number]]  (Abnormal) Collected: 05/25/21 1818    Lab Status: Final result Specimen: Urine Updated: 05/25/21 1820     Color, UA Yellow     Clarity, UA Clear     pH, UA 6 0     Leukocytes, UA Negative     Nitrite, UA Negative     Protein, UA Trace mg/dl      Glucose,  (1/2%) mg/dl      Ketones, UA Negative mg/dl      Urobilinogen, UA 0 2 E U /dl      Bilirubin, UA Negative     Blood, UA Negative     Specific Gravity, UA 1 010    Narrative:      CLINITEK RESULT                 CTA head and neck with and without contrast   Final Result by Karine 6, DO (05/25 2030)      Normal CT of the brain  Normal CTA of the neck and brain  Workstation performed: [id number]                    Procedures  Procedures         ED Course                             SBIRT 20yo+      Most Recent Value   SBIRT (22 yo +)   In order to provide better care to our patients, we are screening all of our patients for alcohol and drug use  Would it be okay to ask you these screening questions? Yes Filed at: 05/25/2021 2022   Initial Alcohol Screen: US AUDIT-C    1  How often do you have a drink containing alcohol? 1 Filed at: 05/25/2021 2022   2  How many drinks containing alcohol do you have on a typical day you are drinking? 0 Filed at: 05/25/2021 2022   3a  Male UNDER 65: How often do you have five or more drinks on one occasion? 0 Filed at: 05/25/2021 2022   3b  FEMALE Any Age, or MALE 65+: How often do you have 4 or more drinks on one occassion? 0 Filed at: 05/25/2021 2022   Audit-C Score  1 Filed at: 05/25/2021 2022   JODEE: How many times in the past year have you    Used an illegal drug or used a prescription medication for non-medical reasons?   Never Filed at: 05/25/2021 2022                    MDM  Number of Diagnoses or Management Options  Dizziness: new and requires workup  HTN (hypertension): new and requires workup  Hyperglycemia: new and requires workup  Diagnosis management comments: Afebrile and hemodynamically stable  NIH stroke scale 0 on arrival with normal neurologic exam as above  Hypertensive to 191/117  Reports that she has not taken any of her antihypertensive agents over the last week  She was given her home dose of metoprolol Norvasc with improvement in blood pressure to 733 systolic prior to admission  Given report of word-finding difficulty, CTA obtained of the head and neck with no acute abnormalities  Blood glucose elevated to 534  Bicarb normal, no ketones in the urine, doubt DKA  10 mg of subcutaneous insulin given with 1 L of normal saline  I personally interpreted the patient's EKG which reveals normal rate, sinus rhythm with occasional PVCs, normal axis, prolonged QTc to 490 ms, no acute ischemic changes, no prior available for comparison  Troponin obtained in the setting of risk stratification which is undetectable, patient denies chest pain  Episode of word-finding difficulty and lightheadedness possibly secondary to hypertensive crisis vs TIA vs hyperglycemia  Will admit to medicine for further management          Amount and/or Complexity of Data Reviewed  Clinical lab tests: ordered and reviewed  Tests in the radiology section of CPT®: ordered and reviewed  Review and summarize past medical records: yes  Discuss the patient with other providers: yes  Independent visualization of images, tracings, or specimens: yes    Patient Progress  Patient progress: improved           Disposition  Final diagnoses:   Dizziness   Hyperglycemia   HTN (hypertension)   Prolonged Q-T interval on ECG     Time reflects when diagnosis was documented in both MDM as applicable and the Disposition within this note     Time User Action Codes Description Comment    5/25/2021  8:58 PM Francisco Guo Add [R42] Dizziness     5/25/2021  8:58 PM Francisco Guo Add [R73 9] Hyperglycemia     5/25/2021  8:58 PM Francisco Guo Add [I10] HTN (hypertension)     5/25/2021  9:51 PM Rhae Spies Add [R47 89] Word finding difficulty     5/25/2021  9:51 PM Paula Cohen [A16 42] Word finding difficulty     5/25/2021  9:51 PM Rhae Spies Add [C28 39] Word finding difficulty     5/25/2021  9:55 PM Joan RAIN Add [E11 65] Uncontrolled type 2 diabetes mellitus with hyperglycemia ([facility] [address] )     5/26/2021 12:44 AM Brenda Milder Add [R94 31] Prolonged Q-T interval on ECG       ED Disposition     ED Disposition Condition Date/Time Comment    Admit Stable Tue May 25, 2021  8:58 PM Case was discussed with EDIL and the patient's admission status was agreed to be Admission Status: inpatient status to the service of Dr Rachel Bryant  Follow-up Information    None         Current Discharge Medication List      CONTINUE these medications which have NOT CHANGED    Details   ALPRAZolam (XANAX) 0 5 mg tablet Take 0 5 mg by mouth daily as needed      amLODIPine (NORVASC) 5 mg tablet Take 5 mg by mouth daily  Refills: 2      aspirin 81 MG tablet Take 81 mg by mouth daily      escitalopram (LEXAPRO) 10 mg tablet TAKE 1 TABLET BY MOUTH AT BEDTIME      glimepiride (AMARYL) 4 mg tablet Take 4 mg by mouth 2 (two) times a day      lisinopril (ZESTRIL) 2 5 mg tablet Take by mouth      losartan (COZAAR) 50 mg tablet Take 50 mg by mouth daily  Refills: 2      metoprolol succinate (TOPROL-XL) 50 mg 24 hr tablet Take 50 mg by mouth daily  Refills: 5      nitroglycerin (NITROSTAT) 0 4 mg SL tablet Place 0 4 mg under the tongue      sitaGLIPtin (JANUVIA) 50 mg tablet Take by mouth      sitaGLIPtin-metFORMIN (JANUMET)  MG per tablet Take 1 tablet by mouth      furosemide (LASIX) 20 mg tablet Take 20 mg by mouth daily      meloxicam (MOBIC) 15 mg tablet Take 1 tablet by mouth daily as needed           No discharge procedures on file      PDMP Review     None          ED Provider  Electronically Signed by           Mandi Parr MD  05/26/21 0005

## 2021-05-25 NOTE — LETTER
[address] [facility] MED SURG UNIT  [address] [place] [id number]  Dept: [phone]    May 28, 2021     Patient: Antony Lipscomb   YOB: 1960   Date of Visit: 5/25/2021       To Whom it May Concern:    Antony Lipscomb is under my professional care  She was seen in the hospital from 5/25/2021   to 05/28/21  She may return to school on 5/29/2021 without limitations  If you have any questions or concerns, please don't hesitate to call      Sincerely,      Obey Harper, DO

## 2021-05-25 NOTE — ED NOTES
PT has HX of heart failure but doesn't take her lasix (pee too much) ran out of BP meds     Shira Xiao RN  05/25/21 0380

## 2021-05-26 ENCOUNTER — APPOINTMENT (INPATIENT)
Dept: NON INVASIVE DIAGNOSTICS | Facility: HOSPITAL | Age: 61
DRG: 305 | End: 2021-05-26
Payer: COMMERCIAL

## 2021-05-26 PROBLEM — I47.2 NONSUSTAINED PAROXYSMAL VENTRICULAR TACHYCARDIA (HCC): Status: ACTIVE | Noted: 2021-05-26

## 2021-05-26 PROBLEM — E78.5 HLD (HYPERLIPIDEMIA): Status: ACTIVE | Noted: 2021-05-26

## 2021-05-26 PROBLEM — I47.29 NONSUSTAINED PAROXYSMAL VENTRICULAR TACHYCARDIA: Status: ACTIVE | Noted: 2021-05-26

## 2021-05-26 LAB
ANION GAP SERPL CALCULATED.3IONS-SCNC: 9 MMOL/L (ref 4–13)
ATRIAL RATE: 72 BPM
ATRIAL RATE: 73 BPM
ATRIAL RATE: 94 BPM
BUN SERPL-MCNC: 20 MG/DL (ref 5–25)
CALCIUM SERPL-MCNC: 8.5 MG/DL (ref 8.3–10.1)
CHLORIDE SERPL-SCNC: 108 MMOL/L (ref 100–108)
CHOLEST SERPL-MCNC: 283 MG/DL (ref 50–200)
CO2 SERPL-SCNC: 24 MMOL/L (ref 21–32)
CREAT SERPL-MCNC: 1.04 MG/DL (ref 0.6–1.3)
ERYTHROCYTE [DISTWIDTH] IN BLOOD BY AUTOMATED COUNT: 12.7 % (ref 11.6–15.1)
EST. AVERAGE GLUCOSE BLD GHB EST-MCNC: >355 MG/DL
GFR SERPL CREATININE-BSD FRML MDRD: 58 ML/MIN/1.73SQ M
GLUCOSE SERPL-MCNC: 257 MG/DL (ref 65–140)
GLUCOSE SERPL-MCNC: 323 MG/DL (ref 65–140)
GLUCOSE SERPL-MCNC: 345 MG/DL (ref 65–140)
GLUCOSE SERPL-MCNC: 351 MG/DL (ref 65–140)
GLUCOSE SERPL-MCNC: 382 MG/DL (ref 65–140)
HBA1C MFR BLD: >14 %
HCT VFR BLD AUTO: 42.7 % (ref 34.8–46.1)
HDLC SERPL-MCNC: 33 MG/DL
HGB BLD-MCNC: 14 G/DL (ref 11.5–15.4)
LACTATE SERPL-SCNC: 1.9 MMOL/L (ref 0.5–2)
LDLC SERPL CALC-MCNC: 200 MG/DL (ref 0–100)
MAGNESIUM SERPL-MCNC: 2 MG/DL (ref 1.6–2.6)
MCH RBC QN AUTO: 29.5 PG (ref 26.8–34.3)
MCHC RBC AUTO-ENTMCNC: 32.8 G/DL (ref 31.4–37.4)
MCV RBC AUTO: 90 FL (ref 82–98)
P AXIS: 62 DEGREES
P AXIS: 65 DEGREES
P AXIS: 73 DEGREES
PLATELET # BLD AUTO: 201 THOUSANDS/UL (ref 149–390)
PMV BLD AUTO: 10.2 FL (ref 8.9–12.7)
POTASSIUM SERPL-SCNC: 4.5 MMOL/L (ref 3.5–5.3)
PR INTERVAL: 136 MS
PR INTERVAL: 154 MS
PR INTERVAL: 178 MS
QRS AXIS: 55 DEGREES
QRS AXIS: 59 DEGREES
QRS AXIS: 66 DEGREES
QRSD INTERVAL: 74 MS
QRSD INTERVAL: 76 MS
QRSD INTERVAL: 86 MS
QT INTERVAL: 392 MS
QT INTERVAL: 436 MS
QT INTERVAL: 444 MS
QTC INTERVAL: 477 MS
QTC INTERVAL: 489 MS
QTC INTERVAL: 490 MS
RBC # BLD AUTO: 4.74 MILLION/UL (ref 3.81–5.12)
SODIUM SERPL-SCNC: 141 MMOL/L (ref 136–145)
T WAVE AXIS: 219 DEGREES
T WAVE AXIS: 227 DEGREES
T WAVE AXIS: 36 DEGREES
TRIGL SERPL-MCNC: 249 MG/DL
TSH SERPL DL<=0.05 MIU/L-ACNC: 3.19 UIU/ML (ref 0.36–3.74)
VENTRICULAR RATE: 72 BPM
VENTRICULAR RATE: 73 BPM
VENTRICULAR RATE: 94 BPM
WBC # BLD AUTO: 8.12 THOUSAND/UL (ref 4.31–10.16)

## 2021-05-26 PROCEDURE — 80048 BASIC METABOLIC PNL TOTAL CA: CPT | Performed by: PHYSICIAN ASSISTANT

## 2021-05-26 PROCEDURE — 82948 REAGENT STRIP/BLOOD GLUCOSE: CPT

## 2021-05-26 PROCEDURE — 84443 ASSAY THYROID STIM HORMONE: CPT | Performed by: PSYCHIATRY & NEUROLOGY

## 2021-05-26 PROCEDURE — 93010 ELECTROCARDIOGRAM REPORT: CPT | Performed by: INTERNAL MEDICINE

## 2021-05-26 PROCEDURE — 80061 LIPID PANEL: CPT | Performed by: PHYSICIAN ASSISTANT

## 2021-05-26 PROCEDURE — 93306 TTE W/DOPPLER COMPLETE: CPT | Performed by: INTERNAL MEDICINE

## 2021-05-26 PROCEDURE — 99222 1ST HOSP IP/OBS MODERATE 55: CPT | Performed by: PHYSICIAN ASSISTANT

## 2021-05-26 PROCEDURE — 93306 TTE W/DOPPLER COMPLETE: CPT

## 2021-05-26 PROCEDURE — 85027 COMPLETE CBC AUTOMATED: CPT | Performed by: PHYSICIAN ASSISTANT

## 2021-05-26 PROCEDURE — 99232 SBSQ HOSP IP/OBS MODERATE 35: CPT | Performed by: INTERNAL MEDICINE

## 2021-05-26 PROCEDURE — 83735 ASSAY OF MAGNESIUM: CPT | Performed by: PHYSICIAN ASSISTANT

## 2021-05-26 PROCEDURE — 83036 HEMOGLOBIN GLYCOSYLATED A1C: CPT | Performed by: PHYSICIAN ASSISTANT

## 2021-05-26 PROCEDURE — 93005 ELECTROCARDIOGRAM TRACING: CPT

## 2021-05-26 RX ORDER — LOSARTAN POTASSIUM 50 MG/1
50 TABLET ORAL DAILY
Status: DISCONTINUED | OUTPATIENT
Start: 2021-05-26 | End: 2021-05-28 | Stop reason: HOSPADM

## 2021-05-26 RX ORDER — INSULIN GLARGINE 100 [IU]/ML
10 INJECTION, SOLUTION SUBCUTANEOUS ONCE
Status: COMPLETED | OUTPATIENT
Start: 2021-05-26 | End: 2021-05-26

## 2021-05-26 RX ORDER — AMLODIPINE BESYLATE 5 MG/1
5 TABLET ORAL DAILY
Status: DISCONTINUED | OUTPATIENT
Start: 2021-05-26 | End: 2021-05-28 | Stop reason: HOSPADM

## 2021-05-26 RX ORDER — ATORVASTATIN CALCIUM 40 MG/1
80 TABLET, FILM COATED ORAL EVERY EVENING
Status: DISCONTINUED | OUTPATIENT
Start: 2021-05-26 | End: 2021-05-28 | Stop reason: HOSPADM

## 2021-05-26 RX ORDER — INSULIN GLARGINE 100 [IU]/ML
20 INJECTION, SOLUTION SUBCUTANEOUS
Status: DISCONTINUED | OUTPATIENT
Start: 2021-05-26 | End: 2021-05-27

## 2021-05-26 RX ORDER — METOPROLOL SUCCINATE 50 MG/1
50 TABLET, EXTENDED RELEASE ORAL DAILY
Status: DISCONTINUED | OUTPATIENT
Start: 2021-05-27 | End: 2021-05-28 | Stop reason: HOSPADM

## 2021-05-26 RX ORDER — POTASSIUM CHLORIDE 20 MEQ/1
40 TABLET, EXTENDED RELEASE ORAL ONCE
Status: COMPLETED | OUTPATIENT
Start: 2021-05-26 | End: 2021-05-26

## 2021-05-26 RX ADMIN — ASPIRIN 81 MG: 81 TABLET, CHEWABLE ORAL at 08:52

## 2021-05-26 RX ADMIN — ENOXAPARIN SODIUM 40 MG: 40 INJECTION SUBCUTANEOUS at 08:52

## 2021-05-26 RX ADMIN — INSULIN LISPRO 4 UNITS: 100 INJECTION, SOLUTION INTRAVENOUS; SUBCUTANEOUS at 08:53

## 2021-05-26 RX ADMIN — INSULIN LISPRO 7 UNITS: 100 INJECTION, SOLUTION INTRAVENOUS; SUBCUTANEOUS at 13:09

## 2021-05-26 RX ADMIN — AMLODIPINE BESYLATE 5 MG: 5 TABLET ORAL at 15:47

## 2021-05-26 RX ADMIN — INSULIN GLARGINE 10 UNITS: 100 INJECTION, SOLUTION SUBCUTANEOUS at 09:48

## 2021-05-26 RX ADMIN — ACETAMINOPHEN 650 MG: 325 TABLET, FILM COATED ORAL at 08:52

## 2021-05-26 RX ADMIN — LOSARTAN POTASSIUM 50 MG: 50 TABLET, FILM COATED ORAL at 15:47

## 2021-05-26 RX ADMIN — INSULIN GLARGINE 20 UNITS: 100 INJECTION, SOLUTION SUBCUTANEOUS at 21:21

## 2021-05-26 RX ADMIN — INSULIN LISPRO 4 UNITS: 100 INJECTION, SOLUTION INTRAVENOUS; SUBCUTANEOUS at 17:30

## 2021-05-26 RX ADMIN — INSULIN LISPRO 7 UNITS: 100 INJECTION, SOLUTION INTRAVENOUS; SUBCUTANEOUS at 17:31

## 2021-05-26 RX ADMIN — INSULIN LISPRO 4 UNITS: 100 INJECTION, SOLUTION INTRAVENOUS; SUBCUTANEOUS at 13:09

## 2021-05-26 RX ADMIN — INSULIN LISPRO 2 UNITS: 100 INJECTION, SOLUTION INTRAVENOUS; SUBCUTANEOUS at 21:22

## 2021-05-26 RX ADMIN — ATORVASTATIN CALCIUM 80 MG: 40 TABLET, FILM COATED ORAL at 17:28

## 2021-05-26 RX ADMIN — POTASSIUM CHLORIDE 40 MEQ: 1500 TABLET, EXTENDED RELEASE ORAL at 01:12

## 2021-05-26 NOTE — CONSULTS
Consultation - Neurology   Mery Ferrell Radha 64 y o  female MRN: [id number]  Unit/Bed#: S -01 Encounter: [id number]      Assessment/Plan     * Word finding difficulty  Assessment & Plan  Samreen Khan is a 64 y o  female with HTN, DM2, former tobacco use, and heart failure who presented to [address] [place] [address] ED on 5/25/2021 due to transient word finding difficulty and dysarthria associated with confusion, generalized weakness, shakiness, blurred vision, and lightheadedness with complete resolution within 4 hours  · /117  · Labs revealed glucose 534 and sodium 133  · CTA head/neck negative for acute intracranial abnormalities, LVO, or significant stenosis  Etiology for transient symptoms likely secondary to hypertensive urgency and/or metabolic disturbances (hyperglycemia and hyponatremia) especially due to symptoms improving once blood pressure was better controlled  Of note, patient ran out of her blood pressure medications 1 week ago  Cannot fully rule out TIA  Plan:  - Will defer MRI brain due to nonfocal exam and symptoms likely being secondary to HTN and metabolic disturbances,  - Continue home aspirin 81 mg daily   - Start Lipitor 40 mg daily  - , Cholesterol 283  - Hemoglobin A1C pending  - Normotensive goal  - Euglycemic goal  - PT/OT  - Continue to monitor and notify neurology with any changes     - Medical management and supportive care per primary team  Correction of any metabolic or infectious disturbances    HLD (hyperlipidemia)  Assessment & Plan  - Newly diagnosed during this admission  - Start Lipitor 40 mg daily    Hypertensive urgency  Assessment & Plan  - /117 on arrival to the ED  - Patient ran out of her antihypertensives 1 week ago    Uncontrolled type 2 diabetes mellitus with hyperglycemia [facility])  Assessment & Plan    Recent Labs     05/25/21  2231 05/26/21  0737   POCGLU 311* 382*     - Euglycemic goal        Navdeep Braunsofi will need follow up in in 6 weeks with general attending or advance practitioner  She will not require outpatient neurological testing  History of Present Illness     Reason for Consult / Principal Problem: word finding difficulty  Hx and PE limited by:  Not applicable  HPI: Osiel Lucio is a 64 y o  female with HTN, DM2, former tobacco use, and heart failure who presented to [facility] ED on 5/25/2021 due to transient word finding difficulty and dysarthria associated with confusion, generalized weakness, shakiness, blurred vision, and lightheadedness with complete resolution within 4 hours  Yesterday, patient was at work and around 1:30 PM, she started noticing word-finding difficulty and slight slurring of her words  She felt dazed and confused  She also noted that both legs felt weak, shaky, and she was lightheaded feeling like she was going to pass out  Both eyes were slightly more blurred than usual   No loss of consciousness and she was aware of everything happening around her  No associated numbness or tingling, headache, or chest pain  She stayed at work until 3:30 PM and her son brought her home  Due to symptoms not improving, around 4:00 PM patient was brought to the hospital   While en route she began to feel better and by 5:30 she was back to baseline  She states that approximately 1 week ago she ran out of all of her blood pressure medications  She does not regularly check her blood sugar at home  She is taking aspirin  She thinks she was previously on Lipitor, but does not know why she is no longer on this medication  Upon arrival to the ED, /117  Labs revealed glucose 534 and sodium 133  CTA head/neck negative for acute intracranial abnormalities, LVO, or significant stenosis  EKG revealed QTc prolonged at 490  On exam today, patient is back to baseline  Denies any return of the lightheadedness or word-finding difficulties    The blurred vision has also resolved  Denies headache , numbness, tingling, arm/leg weakness, chest pain, shortness of breath, and abdominal pain  Nonfocal neuro exam   No history of strokes or TIAs in the past     Inpatient consult to Neurology  Consult performed by: Connie Barth PA-C  Consult ordered by: Joselo Delgado PA-C          Review of Systems   Constitutional: Negative for fatigue and fever  HENT: Negative for trouble swallowing and voice change  Eyes: Negative for photophobia and visual disturbance  Respiratory: Negative for chest tightness and shortness of breath  Cardiovascular: Negative for chest pain and palpitations  Gastrointestinal: Negative for abdominal pain  Genitourinary: Negative for difficulty urinating  Musculoskeletal: Negative for back pain and gait problem  Neurological: Negative for dizziness, tremors, facial asymmetry, speech difficulty (Resolved), weakness ( resolved), light-headedness ( resolved), numbness and headaches  Psychiatric/Behavioral: Negative for confusion  Historical Information   Past Medical History:   Diagnosis Date    Diabetes mellitus ([facility] )     Heart failure ([facility] )     Hypertension      History reviewed  No pertinent surgical history  Social History   Social History     Substance and Sexual Activity   Alcohol Use Yes     Social History     Substance and Sexual Activity   Drug Use Never     E-Cigarette/Vaping     E-Cigarette/Vaping Substances     Social History     Tobacco Use   Smoking Status Former Smoker   Smokeless Tobacco Former User     Family History: History reviewed  No pertinent family history  Review of previous medical records was completed   Reviewed prior notes, labs, CTA head/neck    Meds/Allergies   all current active meds have been reviewed, current meds:   Current Facility-Administered Medications   Medication Dose Route Frequency    acetaminophen (TYLENOL) tablet 650 mg  650 mg Oral Q4H PRN    aspirin chewable tablet 81 mg  81 mg Oral Daily    atorvastatin (LIPITOR) tablet 40 mg  40 mg Oral QPM    enoxaparin (LOVENOX) subcutaneous injection 40 mg  40 mg Subcutaneous Daily    insulin glargine (LANTUS) subcutaneous injection 20 Units 0 2 mL  20 Units Subcutaneous HS    insulin lispro (HumaLOG) 100 units/mL subcutaneous injection 1-5 Units  1-5 Units Subcutaneous TID AC    insulin lispro (HumaLOG) 100 units/mL subcutaneous injection 1-5 Units  1-5 Units Subcutaneous HS    insulin lispro (HumaLOG) 100 units/mL subcutaneous injection 7 Units  7 Units Subcutaneous TID With Meals    and PTA meds:   Prior to Admission Medications   Prescriptions Last Dose Informant Patient Reported? Taking?    ALPRAZolam (XANAX) 0 5 mg tablet Past Week at Unknown time  Yes Yes   Sig: Take 0 5 mg by mouth daily as needed   amLODIPine (NORVASC) 5 mg tablet Past Week at Unknown time  Yes Yes   Sig: Take 5 mg by mouth daily   aspirin 81 MG tablet Past Week at Unknown time  Yes Yes   Sig: Take 81 mg by mouth daily   escitalopram (LEXAPRO) 10 mg tablet Past Week at Unknown time  Yes Yes   Sig: TAKE 1 TABLET BY MOUTH AT BEDTIME   furosemide (LASIX) 20 mg tablet Not Taking at Unknown time  Yes No   Sig: Take 20 mg by mouth daily   glimepiride (AMARYL) 4 mg tablet Past Week at Unknown time  Yes Yes   Sig: Take 4 mg by mouth 2 (two) times a day   lisinopril (ZESTRIL) 2 5 mg tablet Past Week at Unknown time  Yes Yes   Sig: Take by mouth   losartan (COZAAR) 50 mg tablet Past Week at Unknown time  Yes Yes   Sig: Take 50 mg by mouth daily   meloxicam (MOBIC) 15 mg tablet Not Taking at Unknown time  Yes No   Sig: Take 1 tablet by mouth daily as needed   metoprolol succinate (TOPROL-XL) 50 mg 24 hr tablet Past Week at Unknown time  Yes Yes   Sig: Take 50 mg by mouth daily   nitroglycerin (NITROSTAT) 0 4 mg SL tablet Past Week at Unknown time  Yes Yes   Sig: Place 0 4 mg under the tongue   sitaGLIPtin (JANUVIA) 50 mg tablet Past Week at Unknown time  Yes Yes   Sig: Take by mouth sitaGLIPtin-metFORMIN (JANUMET)  MG per tablet Past Week at Unknown time  Yes Yes   Sig: Take 1 tablet by mouth      Facility-Administered Medications: None       No Known Allergies    Objective   Vitals:Blood pressure 113/62, pulse 70, temperature 98 3 °F (36 8 °C), temperature source Oral, resp  rate 18, height 5' 5" (1 651 m), weight 68 2 kg (150 lb 5 7 oz), SpO2 98 %  ,Body mass index is 25 02 kg/m²  Intake/Output Summary (Last 24 hours) at 5/26/2021 0909  Last data filed at 5/26/2021 0116  Gross per 24 hour   Intake 360 ml   Output --   Net 360 ml       Invasive Devices: Invasive Devices     Peripheral Intravenous Line            Peripheral IV 05/25/21 Right Antecubital less than 1 day                Physical Exam  Vitals signs and nursing note reviewed  Constitutional:       Appearance: Normal appearance  Comments: Patient lying comfortably in bed in no acute distress  HENT:      Head: Normocephalic and atraumatic  Nose: Nose normal       Mouth/Throat:      Mouth: Mucous membranes are moist       Pharynx: Oropharynx is clear  Eyes:      Extraocular Movements: Extraocular movements intact  Conjunctiva/sclera: Conjunctivae normal       Pupils: Pupils are equal, round, and reactive to light  Pulmonary:      Effort: Pulmonary effort is normal    Musculoskeletal: Normal range of motion  Skin:     General: Skin is warm and dry  Neurological:      General: No focal deficit present  Mental Status: She is alert and oriented to person, place, and time  Neurologic Exam     Mental Status   Oriented to person, place, and time  Patient alert and oriented to person, place, city, month, and year  Slight dysarthria, but patient states that is her normal speech  No aphasia  Able to follow simple and cross over commands  Cranial Nerves     CN III, IV, VI   Pupils are equal, round, and reactive to light  Pupils 3 mm, round, reactive to light bilaterally    EOMs intact without nystagmus  No visual field deficits  Facial sensation to light touch intact throughout  Facial expression is full and symmetric  Tongue midline  Palate raises symmetrically  Full strength of sternocleidomastoid and trapezius muscles  Motor Exam   Muscle bulk: normal  Overall muscle tone: normal  Right arm pronator drift: absent  Left arm pronator drift: absent  5/5 strength in bilateral upper and lower extremities  Sensory Exam   Sensation to light touch, temperature, and vibration intact throughout  Gait, Coordination, and Reflexes     Gait  Gait: (Deferred for patient's safety)  No ataxia finger-to-nose bilaterally  No resting or action tremor  No ankle clonus bilaterally  2+ reflexes throughout except diminished achilles reflexes bilaterally       Lab Results:   I have personally reviewed pertinent reports    , CBC:   Results from last 7 days   Lab Units 05/26/21  0549 05/25/21 2324 05/25/21  1818   WBC Thousand/uL 8 12  --  7 83   RBC Million/uL 4 74  --  5 39*   HEMOGLOBIN g/dL 14 0  --  16 1*   HEMATOCRIT % 42 7  --  47 3*   MCV fL 90  --  88   PLATELETS Thousands/uL 201 238 197   , BMP/CMP:   Results from last 7 days   Lab Units 05/26/21  0549 05/25/21 2324 05/25/21  1818   SODIUM mmol/L 141 135* 133*   POTASSIUM mmol/L 4 5 3 5 4 1   CHLORIDE mmol/L 108 102 94*   CO2 mmol/L 24 24 24   BUN mg/dL 20 16 20   CREATININE mg/dL 1 04 1 00 1 12   CALCIUM mg/dL 8 5 8 7 9 2   AST U/L  --   --  27   ALT U/L  --   --  35   ALK PHOS U/L  --   --  145*   EGFR ml/min/1 73sq m 58 61 53   , Vitamin B12:   , HgBA1C:   , TSH:   Results from last 7 days   Lab Units 05/26/21  0549   TSH 3RD GENERATON uIU/mL 3 190   , Coagulation:   , Lipid Profile:   Results from last 7 days   Lab Units 05/26/21  0549   HDL mg/dL 33*   LDL CALC mg/dL 200*   TRIGLYCERIDES mg/dL 249*   , Ammonia:   , Urinalysis:   Results from last 7 days   Lab Units 05/25/21  1818   COLOR UA  Yellow   CLARITY UA  Clear   SPEC GRAV UA 1 010   PH UA  6 0   LEUKOCYTES UA  Negative   NITRITE UA  Negative   GLUCOSE UA mg/dl 500 (1/2%)*   KETONES UA mg/dl Negative   BILIRUBIN UA  Negative   BLOOD UA  Negative   , Drug Screen:   , Medication Drug Levels:       Invalid input(s): CARBAMAZEPINE,  PHENOBARB, LACOSAMIDE, OXCARBAZEPINE  Imaging Studies: I have personally reviewed pertinent reports  and I have personally reviewed pertinent films in PACS  EKG, Pathology, and Other Studies: I have personally reviewed pertinent reports  and I have personally reviewed pertinent films in PACS  VTE Prophylaxis: Sequential compression device (Venodyne)  and Enoxaparin (Lovenox)    Code Status: Level 1 - Full Code  Advance Directive and Living Will:      Power of :    POLST:      Counseling / Coordination of Care  Total time spent today 51 minutes  Greater than 50% of total time was spent with the patient and/or family counseling and/or coordination of care  A description of the counseling/coordination of care:  Patient was seen and evaluated  Discussed with attending  Chart reviewed thoroughly including laboratory and imaging studies    Plan of care discussed with patient and primary team

## 2021-05-26 NOTE — ASSESSMENT & PLAN NOTE
· QTc prolonged at 490 on EKG  Prior EKG from 1/2013 with QTc of 472  · EKG today is 477  · Patient is on Lexapro as an outpatient which will be held  · Avoid QT prolonging medications

## 2021-05-26 NOTE — PHYSICAL THERAPY NOTE
PHYSICAL THERAPY SCREEN NOTE    Patient Name: Lydia Welsh Date: 5/26/2021 05/26/21 4397   PT Last Visit   PT Visit Date 05/26/21   Note Type   Note type Screen   Activity Tolerance   Medical Staff Made Aware WESLEY khan   Nurse Made Aware RN Puja   Assessment   Assessment PT orders received, chart review performed  Spoke to Maria Elena who reports pt is ambulating in room indepdnently  Contact made w/ pt who reports that aside from a small HA, her symptoms have resolved  Has been ambulating at baseline and anticipates no difficulties navigating home environment upon DC  Will DC from IPPT caseload       Enid Parks, PT, DPT

## 2021-05-26 NOTE — ASSESSMENT & PLAN NOTE
· Presented after an episode of word finding difficulty, blurry vision and dizziness  Symptoms reportedly lasted about an hour and a half prior to resolving  · R/o TIA vs due to hypertensive urgency vs symptomatic hyperglycemia  · CTA head and neck was a normal study  · Stroke pathway  · Monitor neuro checks per protocol and notify of any changes  · Obtain echo  · Neurology consulted  Defer further imaging recommendations to neurology given normal CTA head/neck  · Obtain lipid panel and Hg A1c   · BP elevated on presentation but has improved since receiving her BP medications in the ED  Avoid lowering further at this time

## 2021-05-26 NOTE — H&P
[facility] 1960, 64 y o  female MRN: [id number]  Unit/Bed#: S -01 Encounter: [id number]  Primary Care Provider: Mile Belle MD   Date and time admitted to hospital: 5/25/2021  4:44 PM    * Word finding difficulty  Assessment & Plan  · Presented after an episode of word finding difficulty, blurry vision and dizziness  Symptoms reportedly lasted about an hour and a half prior to resolving  · R/o TIA vs due to hypertensive urgency vs symptomatic hyperglycemia  · CTA head and neck was a normal study  · Stroke pathway  · Monitor neuro checks per protocol and notify of any changes  · Obtain echo  · Neurology consulted  Defer further imaging recommendations to neurology given normal CTA head/neck  · Obtain lipid panel and Hg A1c   · BP elevated on presentation but has improved since receiving her BP medications in the ED  Avoid lowering further at this time  Uncontrolled type 2 diabetes mellitus with hyperglycemia ([facility] [address] )  Assessment & Plan  No results found for: HGBA1C    No results for input(s): POCGLU in the last 72 hours  Blood Sugar Average: Last 72 hrs:      · Blood glucose 534 on BMP on presentation  · Patient reports that she ran out of her medications about 1 week ago  Her home medication regimen includes Janumet and Amaryl  She admits to polydipsia and polyuria over the past few days  · AG noted to be elevated to 15 on presentation but urine negative for ketones  · Received 10 units of insulin in the ED as well as a 1L bolus of NS    · Monitor BG AC and HS  · Consider insulin drip if no improvement with above  · SSI for coverage  Hold oral medications while inpatient  · Check Hg A1c  Prolonged QT interval  Assessment & Plan  · QTc prolonged at 490 on EKG  Prior EKG from 1/2013 with QTc of 472  · Patient is on Lexapro as an outpatient which will be held     · Repeat EKG in AM    · Avoid QT prolonging medications  Hyponatremia in the setting of hyperglycemia  Assessment & Plan  · Na 133 on presentation  · Corrected Na for hyperglycemia is 140  · Repeat BMP in AM      Hypertensive urgency  Assessment & Plan  · SBP 190s on presentation  · Patient has not taken her medications the past 1 week as she ran out of them  · Received her home medications in the ED, amlodipine 5 mg, losartan 50 mg and metoprolol succinate 50 mg and BP has improved  · Avoid lowering BP further at this time in the setting of possible TIA  Home AM doses of the above medications to allow for permissive HTN  · Continue to monitor BP closely  VTE Prophylaxis: Enoxaparin (Lovenox)  / sequential compression device   Code Status: FULL CODE  POLST: POLST form is not discussed and not completed at this time  Discussion with family: Pt's daughter at bedside    Anticipated Length of Stay:  Patient will be admitted on an Inpatient basis with an anticipated length of stay of greater than 2 midnights  Justification for Hospital Stay:  Speech difficulty, hyperglycemia and uncontrolled hypertension, need for blood sugar control and stroke rule out with neuro eval    Total Time for Visit, including Counseling / Coordination of Care: 70 minutes  Greater than 50% of this total time spent on direct patient counseling and coordination of care  Chief Complaint:   Speech difficulty and dizziness    History of Present Illness:    Ranjeet Fox is a 64 y o  female with a history of hypertension, type 2 diabetes, former smoker and CHF who presents with speech difficulty and dizziness  Patient reports that she was in her usual state of health until this afternoon when she was at work and began to feel disoriented and then notes that she had difficulty speaking  She reports that there were people talking to her but she had word finding difficulty and notes a delay in her answers   She states that when she was able to speak her voice sounded different  She also notes feeling dizzy and generally weak at that time and reports some blurred vision as well  She denies ever having had similar symptoms in the past and reports that her symptoms lasted for about an hour and half and resolved on presentation to the hospital   She denies recent fevers, chills, chest pain, SOB, cough, abdominal pain, and/V/D  She does admit to increased urinary frequency and polydipsia recently  She also notes that she has recently been getting cramps in her legs at night  She reportedly ran out of all of her medications about 1 week ago and attempted to refill them but there was no refills available  She is scheduled to see her PCP tomorrow for refills of her medications  Review of Systems:    Review of Systems   Constitutional: Negative for appetite change, chills and fever  Respiratory: Negative for cough and shortness of breath  Cardiovascular: Negative for chest pain and leg swelling  Gastrointestinal: Negative for abdominal pain, diarrhea, nausea and vomiting  Endocrine: Positive for polydipsia and polyuria  Musculoskeletal: Negative for back pain  Neurological: Positive for dizziness, speech difficulty, weakness and light-headedness  Negative for headaches  Psychiatric/Behavioral: Positive for confusion (disoriented)  All other systems reviewed and are negative  Past Medical and Surgical History:     Past Medical History:   Diagnosis Date    Diabetes mellitus ([facility] [address] )     Heart failure ([facility] [address] )     Hypertension        History reviewed  No pertinent surgical history  Meds/Allergies:    Prior to Admission medications    Medication Sig Start Date End Date Taking?  Authorizing Provider   ALPRAZolam Jason Bathe) 0 5 mg tablet Take 0 5 mg by mouth daily as needed 10/28/13  Yes Historical Provider, MD   amLODIPine (NORVASC) 5 mg tablet Take 5 mg by mouth daily 7/1/19  Yes Historical Provider, MD   aspirin 81 MG tablet Take 81 mg by mouth daily 10/28/13  Yes Historical Provider, MD   escitalopram (LEXAPRO) 10 mg tablet TAKE 1 TABLET BY MOUTH AT BEDTIME 5/23/18  Yes Historical Provider, MD   glimepiride (AMARYL) 4 mg tablet Take 4 mg by mouth 2 (two) times a day   Yes Historical Provider, MD   lisinopril (ZESTRIL) 2 5 mg tablet Take by mouth   Yes Historical Provider, MD   losartan (COZAAR) 50 mg tablet Take 50 mg by mouth daily 7/1/19  Yes Historical Provider, MD   metoprolol succinate (TOPROL-XL) 50 mg 24 hr tablet Take 50 mg by mouth daily 7/1/19  Yes Historical Provider, MD   nitroglycerin (NITROSTAT) 0 4 mg SL tablet Place 0 4 mg under the tongue 11/20/13  Yes Historical Provider, MD   sitaGLIPtin (JANUVIA) 50 mg tablet Take by mouth   Yes Historical Provider, MD   sitaGLIPtin-metFORMIN (JANUMET)  MG per tablet Take 1 tablet by mouth 6/22/15  Yes Historical Provider, MD   furosemide (LASIX) 20 mg tablet Take 20 mg by mouth daily    Historical Provider, MD   meloxicam (MOBIC) 15 mg tablet Take 1 tablet by mouth daily as needed 6/13/16   Historical Provider, MD   etodolac (LODINE) 400 MG tablet Take 1 tablet (400 mg total) by mouth 2 (two) times a day for 30 days 8/26/19 5/25/21  Niki Huerta DPM     I have reviewed home medications with patient personally  Allergies: No Known Allergies    Social History:     Marital Status: Legally    Patient Pre-hospital Living Situation: home  Patient Pre-hospital Level of Mobility: independent  Patient Pre-hospital Diet Restrictions: low carb  Substance Use History:   Social History     Substance and Sexual Activity   Alcohol Use Yes     Social History     Tobacco Use   Smoking Status Former Smoker   Smokeless Tobacco Former User     Social History     Substance and Sexual Activity   Drug Use Never       Family History:    History reviewed  No pertinent family history      Physical Exam:     Vitals:   Blood Pressure: 137/87 (05/25/21 2209)  Pulse: 69 (05/25/21 2209)  Temperature: 98 4 °F (36 9 °C) (05/25/21 2209)  Temp Source: Oral (05/25/21 2209)  Respirations: 18 (05/25/21 2209)  Weight - Scale: 69 1 kg (152 lb 5 4 oz) (05/25/21 1650)  SpO2: 97 % (05/25/21 2209)    Physical Exam  Vitals signs and nursing note reviewed  Constitutional:       General: She is not in acute distress  Appearance: She is not ill-appearing or diaphoretic  HENT:      Head: Normocephalic and atraumatic  Eyes:      Conjunctiva/sclera: Conjunctivae normal    Cardiovascular:      Rate and Rhythm: Normal rate and regular rhythm  Abdominal:      General: Bowel sounds are normal       Palpations: Abdomen is soft  Tenderness: There is no abdominal tenderness  Skin:     General: Skin is warm and dry  Neurological:      General: No focal deficit present  Mental Status: She is alert and oriented to person, place, and time  Cranial Nerves: No dysarthria or facial asymmetry  Sensory: No sensory deficit  Motor: No weakness  Coordination: Finger-Nose-Finger Test normal       Comments: Tongue midline  Strength 5/5 in upper and lower extremities  Psychiatric:         Mood and Affect: Mood normal          Speech: Speech normal          Behavior: Behavior normal          Additional Data:     Lab Results: I have personally reviewed pertinent reports  Results from last 7 days   Lab Units 05/25/21  1818   WBC Thousand/uL 7 83   HEMOGLOBIN g/dL 16 1*   HEMATOCRIT % 47 3*   PLATELETS Thousands/uL 197   NEUTROS PCT % 66   LYMPHS PCT % 27   MONOS PCT % 5   EOS PCT % 1     Results from last 7 days   Lab Units 05/25/21  1818   SODIUM mmol/L 133*   POTASSIUM mmol/L 4 1   CHLORIDE mmol/L 94*   CO2 mmol/L 24   BUN mg/dL 20   CREATININE mg/dL 1 12   ANION GAP mmol/L 15*   CALCIUM mg/dL 9 2   ALBUMIN g/dL 3 9   TOTAL BILIRUBIN mg/dL 0 52   ALK PHOS U/L 145*   ALT U/L 35   AST U/L 27   GLUCOSE RANDOM mg/dL 534*                       Imaging: I have personally reviewed pertinent reports        CTA head and neck with and without contrast   Final Result by Dimas Lynch DO (05/25 2030)      Normal CT of the brain  Normal CTA of the neck and brain  Workstation performed: [id number]             EKG, Pathology, and Other Studies Reviewed on Admission:   · EKG: sinus rhythm, prolonged QTc of 490  Allscripts / Epic Records Reviewed: Yes     ** Please Note: This note has been constructed using a voice recognition system   **

## 2021-05-26 NOTE — ASSESSMENT & PLAN NOTE
· QTc prolonged at 490 on EKG  Prior EKG from 1/2013 with QTc of 472  · Patient is on Lexapro as an outpatient which will be held  · Repeat EKG in AM    · Avoid QT prolonging medications

## 2021-05-26 NOTE — PROGRESS NOTES
[facility]  Progress Note Kishan Kangivannaiss 1960, 64 y o  female MRN: [id number]  Unit/Bed#: S -01 Encounter: [id number]  Primary Care Provider: Calin Betancourt MD   Date and time admitted to hospital: 5/25/2021  4:44 PM    HLD (hyperlipidemia)  Assessment & Plan  - Lipid Profile 05/26/2021: Total Cholesterol 283 / Triglycerides 249 / HDL 33 /   - PTA on losartan 50 mg daily, will discontinue and start the patient on atorvastatin 80 mg daily    Prolonged QT interval  Assessment & Plan  · QTc prolonged at 490 on EKG  Prior EKG from 1/2013 with QTc of 472  · EKG today is 477  · Patient is on Lexapro as an outpatient which will be held  · Avoid QT prolonging medications  Hyponatremia in the setting of hyperglycemia  Assessment & Plan  · Na 133 on presentation, corrected Na for hyperglycemia is 140  · Resolved    Results from last 7 days   Lab Units 05/26/21  0549 05/25/21  2324 05/25/21  1818   SODIUM mmol/L 141 135* 133*         Hypertensive urgency  Assessment & Plan  · SBP 190s on presentation  · Patient has not taken her medications the past 1 week as she ran out of them  · Received her home medications in the ED, amlodipine 5 mg, losartan 50 mg and metoprolol succinate 50 mg and BP has improved  · Will restart amlodipine today, and the rest of her home medication tomorrow  · Continue to monitor BP closely  Uncontrolled type 2 diabetes mellitus with hyperglycemia [facility])  Assessment & Plan  No results found for: HGBA1C    Recent Labs     05/25/21  2231 05/26/21  0737 05/26/21  1203   POCGLU 311* 382* 351*       Blood Sugar Average: Last 72 hrs:  (P) 348    · Blood glucose 534 on BMP on presentation  · Patient reports that she ran out of her medications about 1 week ago  Her home medication regimen includes Janumet and Amaryl  She admits to polydipsia and polyuria over the past few days    · AG noted to be elevated to 15 on presentation but urine negative for ketones  · Received 10 units of insulin in the ED as well as a 1L bolus of NS    · Monitor BG AC and HS  · SSI for coverage  Hold oral medications while inpatient  · Check Hg A1c - pending  · Start 20 units of Lantus q h s  * Word finding difficulty  Assessment & Plan  · Presented after an episode of word finding difficulty, blurry vision and dizziness  Symptoms reportedly lasted about an hour and a half prior to resolving  Most likely the her symptoms were due to hypertensive urgency vs symptomatic hyperglycemia  Unlikely TIA but cannot fully rule it out  · CTA head and neck was a normal study  · Stroke pathway  · Monitor neuro checks per protocol and notify of any changes  · Obtain echo  · Neurology consulted  Per Neurology they will defer MRI brain due to nonfocal exam and symptoms likely being secondary to HTN and metabolic disturbances  · Hg A1c pending  · Lipid Profile 05/26/2021: Total Cholesterol 283 / Triglycerides 249 / HDL 33 /   · BP elevated on presentation but has improved since receiving her BP medications in the ED  Avoid lowering further at this time  VTE Pharmacologic Prophylaxis:   Pharmacologic: Enoxaparin (Lovenox)  Mechanical VTE Prophylaxis in Place: Yes    Discussions with Specialists or Other Care Team Provider:  Neurology    Education and Discussions with Family / Patient:  Discussed plan of care with the patient, patient denied having his contact family members    Current Length of Stay: 1 day(s)    Current Patient Status: Inpatient     Discharge Plan / Estimated Discharge Date: To be determined    Code Status: Level 1 - Full Code    Subjective:   Patient seen and examined  No acute events overnight  The patient has continued to have elevated blood sugar levels we will increase her insulin regime  The patient has had no recurrence of symptoms    Neurology evaluated the patient and does not feel that this is a TIA however could not fully ruled out most likely her symptoms were secondary to her hyperglycemia and or hypertension  Patient denies any headache, dizziness, nausea, vomiting, constipation, diarrhea, chest pain, palpitations, shortness of breath, wheezing  A 12 point review of systems was completed and was negative unless noted above  Objective:     Vitals:   Temp (24hrs), Av 1 °F (36 7 °C), Min:97 8 °F (36 6 °C), Max:98 4 °F (36 9 °C)    Temp:  [97 8 °F (36 6 °C)-98 4 °F (36 9 °C)] 98 2 °F (36 8 °C)  HR:  [65-97] 66  Resp:  [16-18] 18  BP: ()/() 131/69  SpO2:  [92 %-98 %] 94 %  Body mass index is 25 02 kg/m²  Input and Output Summary (last 24 hours): Intake/Output Summary (Last 24 hours) at 2021 1545  Last data filed at 2021 0700  Gross per 24 hour   Intake 600 ml   Output --   Net 600 ml       Physical Exam:     Physical Exam  Vitals signs and nursing note reviewed  Constitutional:       General: She is not in acute distress  Appearance: She is well-developed  She is not diaphoretic  HENT:      Head: Normocephalic and atraumatic  Nose: Nose normal    Eyes:      General:         Right eye: No discharge  Left eye: No discharge  Conjunctiva/sclera: Conjunctivae normal    Neck:      Musculoskeletal: Normal range of motion  Cardiovascular:      Rate and Rhythm: Normal rate and regular rhythm  Heart sounds: No murmur  No friction rub  No gallop  Pulmonary:      Effort: Pulmonary effort is normal  No respiratory distress  Breath sounds: Normal breath sounds  Abdominal:      General: Abdomen is flat  Palpations: Abdomen is soft  Musculoskeletal: Normal range of motion  Right lower leg: No edema  Left lower leg: No edema  Skin:     General: Skin is warm and dry  Neurological:      General: No focal deficit present  Mental Status: She is alert and oriented to person, place, and time     Psychiatric:         Behavior: Behavior normal  Thought Content: Thought content normal          Judgment: Judgment normal           Additional Data:     Labs: I have personally reviewed pertinent reports  Results from last 7 days   Lab Units 05/26/21  0549 05/25/21  2324 05/25/21  1818   WBC Thousand/uL 8 12  --  7 83   HEMOGLOBIN g/dL 14 0  --  16 1*   HEMATOCRIT % 42 7  --  47 3*   PLATELETS Thousands/uL 201 238 197   NEUTROS PCT %  --   --  66   MONOS PCT %  --   --  5      Results from last 7 days   Lab Units 05/26/21  0549 05/25/21  2324 05/25/21  1818   POTASSIUM mmol/L 4 5 3 5 4 1   CHLORIDE mmol/L 108 102 94*   CO2 mmol/L 24 24 24   BUN mg/dL 20 16 20   CREATININE mg/dL 1 04 1 00 1 12   CALCIUM mg/dL 8 5 8 7 9 2   ALK PHOS U/L  --   --  145*   ALT U/L  --   --  35   AST U/L  --   --  27     Results from last 7 days   Lab Units 05/26/21  0549   MAGNESIUM mg/dL 2 0              Results from last 7 days   Lab Units 05/25/21  2324   LACTIC ACID mmol/L 1 9     Results from last 7 days   Lab Units 05/25/21  1818   TROPONIN I ng/mL <0 02       * Additional Pertinent Lab Tests Reviewed: Rafaelmalachi 66 Admission Reviewed    Radiology Results: I have personally reviewed pertinent reports  Cta Head And Neck With And Without Contrast    Result Date: 5/25/2021  Impression: Normal CT of the brain  Normal CTA of the neck and brain   Workstation performed: [id number]       Recent Cultures (last 7 days):           Last 24 Hours Medication List:   Current Facility-Administered Medications   Medication Dose Route Frequency Provider Last Rate    acetaminophen  650 mg Oral Q4H PRN Patricia Sims PA-C      amLODIPine  5 mg Oral Daily Deutsch, DO      aspirin  81 mg Oral Daily Patricia Sims PA-C      atorvastatin  80 mg Oral QPM Syd Paul,       enoxaparin  40 mg Subcutaneous Daily Patricia Sims PA-C      insulin glargine  20 Units Subcutaneous HS Norbert Ricks MD      insulin lispro  1-5 Units Subcutaneous TID AC Patricia Sims PA-C  insulin lispro  1-5 Units Subcutaneous HS Shira Becker PA-C      insulin lispro  7 Units Subcutaneous TID With Meals Norbert Ricks MD      losartan  50 mg Oral Daily Syd Patel DO      [START ON 5/27/2021] metoprolol succinate  50 mg Oral Daily Tho Nugent DO          Today, Patient Was Seen By: Tho Nugent DO    Portions of the record may have been created with voice recognition software  Occasional wrong word or "sound a like" substitutions may have occurred due to the inherent limitations of voice recognition software  Read the chart carefully and recognize, using context, where substitutions have occurred

## 2021-05-26 NOTE — ASSESSMENT & PLAN NOTE
Antony Lipscomb is a 64 y o  female with HTN, DM2, former tobacco use, and heart failure who presented to [facility] ED on 5/25/2021 due to transient word finding difficulty and dysarthria associated with confusion, generalized weakness, shakiness, blurred vision, and lightheadedness with complete resolution within 4 hours  · /117  · Labs revealed glucose 534 and sodium 133  · CTA head/neck negative for acute intracranial abnormalities, LVO, or significant stenosis  Etiology for transient symptoms likely secondary to hypertensive urgency and/or metabolic disturbances (hyperglycemia and hyponatremia) especially due to symptoms improving once blood pressure was better controlled  Of note, patient ran out of her blood pressure medications 1 week ago  Cannot fully rule out TIA  Plan:  - Will defer MRI brain due to nonfocal exam and symptoms likely being secondary to HTN and metabolic disturbances,  - Continue home aspirin 81 mg daily   - Start Lipitor 40 mg daily  - , Cholesterol 283  - Hemoglobin A1C pending  - Normotensive goal  - Euglycemic goal  - PT/OT  - Continue to monitor and notify neurology with any changes     - Medical management and supportive care per primary team  Correction of any metabolic or infectious disturbances

## 2021-05-26 NOTE — PROGRESS NOTES
Patient had a 9 beat run [place] [id number] [place] [address]  Resident [facility] notified  Vitals stable  Currently obtaining an EKG

## 2021-05-26 NOTE — ASSESSMENT & PLAN NOTE
· Presented after an episode of word finding difficulty, blurry vision and dizziness  Symptoms reportedly lasted about an hour and a half prior to resolving  Most likely the her symptoms were due to hypertensive urgency vs symptomatic hyperglycemia  Unlikely TIA but cannot fully rule it out  · CTA head and neck was a normal study  · Stroke pathway  · Monitor neuro checks per protocol and notify of any changes  · Obtain echo  · Neurology consulted  Per Neurology they will defer MRI brain due to nonfocal exam and symptoms likely being secondary to HTN and metabolic disturbances  · Hg A1c pending  · Lipid Profile 05/26/2021: Total Cholesterol 283 / Triglycerides 249 / HDL 33 /   · BP elevated on presentation but has improved since receiving her BP medications in the ED  Avoid lowering further at this time

## 2021-05-26 NOTE — SPEECH THERAPY NOTE
Speech Language/Pathology  Speech/Language Pathology  Assessment    Patient Name: Dain Adan  ETSNW'Q Date: 5/26/2021     Problem List  Principal Problem:    Word finding difficulty  Active Problems:    Uncontrolled type 2 diabetes mellitus with hyperglycemia ([facility] [address] )    Hypertensive urgency    Hyponatremia in the setting of hyperglycemia    Prolonged QT interval    HLD (hyperlipidemia)    Past Medical History  Past Medical History:   Diagnosis Date    Diabetes mellitus ([facility] [address] )     [facility]     Hypertension      Past Surgical History  History reviewed  No pertinent surgical history  Dain Adan is a 64 y o  female with a history of hypertension, type 2 diabetes, former smoker and CHF who presents with speech difficulty and dizziness  Patient reports that she was in her usual state of health until this afternoon when she was at work and began to feel disoriented and then notes that she had difficulty speaking  She reports that there were people talking to her but she had word finding difficulty and notes a delay in her answers  She states that when she was able to speak her voice sounded different  She also notes feeling dizzy and generally weak at that time and reports some blurred vision as well  She denies ever having had similar symptoms in the past and reports that her symptoms lasted for about an hour and half and resolved on presentation to the hospital   She denies recent fevers, chills, chest pain, SOB, cough, abdominal pain, and/V/D  She does admit to increased urinary frequency and polydipsia recently  She also notes that she has recently been getting cramps in her legs at night  She reportedly ran out of all of her medications about 1 week ago and attempted to refill them but there was no refills available  She is scheduled to see her PCP tomorrow for refills of her medications  Consult received for speech/swallow eval on stroke pathway    Pt passed nsg swallow screen; tolerating regular diet w/o s/s dysphagia or aspiration  No speech/language deficits reported  NIH score is 0  MRI: not ordered  Spoke w/ pt who denied any difficulty w/ speech or swallowing currently   No need for formal speech/swallow eval at this time  Reconsult if needed      Harvey Oliveira CCC-SLP  Speech Pathologist  Available via  tiger text

## 2021-05-26 NOTE — ASSESSMENT & PLAN NOTE
· SBP 190s on presentation  · Patient has not taken her medications the past 1 week as she ran out of them  · Received her home medications in the ED, amlodipine 5 mg, losartan 50 mg and metoprolol succinate 50 mg and BP has improved  · Will restart amlodipine today, and the rest of her home medication tomorrow  · Continue to monitor BP closely

## 2021-05-26 NOTE — ASSESSMENT & PLAN NOTE
No results found for: HGBA1C    Recent Labs     05/25/21  2231 05/26/21  0737 05/26/21  1203   POCGLU 311* 382* 351*       Blood Sugar Average: Last 72 hrs:  (P) 348    · Blood glucose 534 on BMP on presentation  · Patient reports that she ran out of her medications about 1 week ago  Her home medication regimen includes Janumet and Amaryl  She admits to polydipsia and polyuria over the past few days  · AG noted to be elevated to 15 on presentation but urine negative for ketones  · Received 10 units of insulin in the ED as well as a 1L bolus of NS    · Monitor BG AC and HS  · SSI for coverage  Hold oral medications while inpatient  · Check Hg A1c - pending  · Start 20 units of Lantus q h s

## 2021-05-26 NOTE — ASSESSMENT & PLAN NOTE
- Pt has had multiple episodes of asymptomatic NSPVT on telemetry  - Patient's electrolytes are normal, will continue to monitor and replete as needed   Goal K > 4; Phos > 3; Mg >2  - Continue telemetry monitoring  - Continue Metoprolol XL  - Cardiology consulted, patient to follow up with prior cardiologist at [facility] on d/c

## 2021-05-26 NOTE — ASSESSMENT & PLAN NOTE
· Na 133 on presentation, corrected Na for hyperglycemia is 140     · Resolved    Results from last 7 days   Lab Units 05/26/21  0549 05/25/21  2324 05/25/21  1818   SODIUM mmol/L 141 135* 133*

## 2021-05-26 NOTE — UTILIZATION REVIEW
Initial Clinical Review    Admission: Date/Time/Statement:   Admission Orders (From admission, onward)     Ordered        05/25/21 2059  Inpatient Admission  Once                   Orders Placed This Encounter   Procedures    Inpatient Admission     Standing Status:   Standing     Number of Occurrences:   1     Order Specific Question:   Level of Care     Answer:   Med Surg [16]     Order Specific Question:   Estimated length of stay     Answer:   More than 2 Midnights     Order Specific Question:   Certification     Answer:   I certify that inpatient services are medically necessary for this patient for a duration of greater than two midnights  See H&P and MD Progress Notes for additional information about the patient's course of treatment  ED Arrival Information     Expected Arrival Acuity Means of Arrival Escorted By Service Admission Type    - 5/25/2021 16:28 Urgent Walk-In Self Hospitalist Urgent    Arrival Complaint    Dizziness        Chief Complaint   Patient presents with    Dizziness     PT presents w/dizziness starting today approx 13:30  thought process felt strange       Initial Presentation: 65 yo female to ED from home w/ speech difficulty and dizziness  Was at work and began to feel disoriented and had difficulty speaking   People talking to her and she had delay in answering   When she was able to speak her voice sounded different   C/o feeling dizzy and some blurred vision   Inc urinary freq and polydipsia   + cramps in legs at night   States she ran out of her meds 1 week ago   SBP 190s on arrival , glucose 534, CT head wnl   Admitted IP status w/ word finding difficulty plan for neuro checks , neuro consult , echo , check lipid panel and A1c  DM consider gtt if no improvement , SSI and monitor  HTN urgency given home meds amlodipine, losartan , lopressor  And monitor BP   Date:  5/26   Day 2: pt cont to have elevated BS , plan to inc insulin Regime   Neuro believes not a TIA , likely d/t hyperglycemia and or HTN  Restart amlodipine and rest of her meds tomorrow  monitor BP     5/26 Neuro consult   Word finding difficulty likely sec to HTN urgency and or metabolic disturbances   Has improved since BP better controlled  Cannot fully r/o TIA   Plan to defer MRI d/t nonfocal exam   Cont asa, start lipitor , PT OT      ED Triage Vitals   Temperature Pulse Respirations Blood Pressure SpO2   05/25/21 1650 05/25/21 1650 05/25/21 1650 05/25/21 1650 05/25/21 1650   97 8 °F (36 6 °C) 97 18 (!) 191/117 98 %      Temp Source Heart Rate Source Patient Position - Orthostatic VS BP Location FiO2 (%)   05/25/21 1650 05/25/21 1900 05/25/21 1650 05/25/21 1650 --   Oral Monitor Lying Left arm       Pain Score       05/25/21 2020       No Pain          Wt Readings from Last 1 Encounters:   05/26/21 68 2 kg (150 lb 5 7 oz)     Additional Vital Signs:   05/26/21 1100  98 °F (36 7 °C)  67  18  126/72  94  92 %  None (Room air)  Lying   05/26/21 0700  --  70  18  113/62  83  98 %  None (Room air)  Lying   05/26/21 0644  98 3 °F (36 8 °C)  67  16  115/67  85  93 %  None (Room air)  Lying   05/26/21 0500  98 4 °F (36 9 °C)  65  16  110/71  86  96 %  None (Room air)  Lying   05/26/21 0300  98 3 °F (36 8 °C)  67  16  103/63  75  94 %  None (Room air)  Lying   05/26/21 0100  97 8 °F (36 6 °C)  67  --  102/64  --  96 %  None (Room air)  Lying   05/26/21 0000  97 8 °F (36 6 °C)  70  18  92/52  68  96 %  None (Room air)  Lying   05/25/21 2300  97 8 °F (36 6 °C)  71  18  119/67  86  95 %  None (Room air)  Lying   05/25/21 2213  --  69  --  134/77  99  --  --  Standing   05/25/21 2212  --  71  --  135/73  95  97 %  --  Sitting   05/25/21 2209  98 4 °F (36 9 °C)  69  18  137/87  106  97 %  None (Room air)  Lying   05/25/21 2023  --  --  --  --  --  --  None (Room air)  --   05/25/21 2020  --  71  18  137/66  --  97 %  None (Room air)  Lying   05/25/21 1900  --  80  18  146/95  117  96 %  None (Room air)  Lying   05/25/21 1700  -- 92  --  183/113Abnormal   140  96 %           Pertinent Labs/Diagnostic Test Results:   5/25 CTA head and neck    Normal CT of the brain    Normal CTA of the neck and brain    5/25 EKG  NSR w/ occas PVCs  5/26 echo    LEFT VENTRICLE:  Systolic function was mildly reduced  Ejection fraction was estimated in the range of 45 % to 50 %  Wall thickness was mildly increased  Doppler parameters were consistent with abnormal left ventricular relaxation (grade 1 diastolic dysfunction)  No evidence of apical thrombus     RIGHT VENTRICLE:  Systolic function was mildly reduced    LEFT ATRIUM:  No thrombus was identified    ATRIAL SEPTUM:  No defect or patent foramen ovale was identified    MITRAL VALVE:  There was trace regurgitation    AORTIC VALVE:  There was no evidence for stenosis      Results from last 7 days   Lab Units 05/26/21  0549 05/25/21 2324 05/25/21  1818   WBC Thousand/uL 8 12  --  7 83   HEMOGLOBIN g/dL 14 0  --  16 1*   HEMATOCRIT % 42 7  --  47 3*   PLATELETS Thousands/uL 201 238 197   NEUTROS ABS Thousands/µL  --   --  5 18     Results from last 7 days   Lab Units 05/26/21  0549 05/25/21 2324 05/25/21  1818   SODIUM mmol/L 141 135* 133*   POTASSIUM mmol/L 4 5 3 5 4 1   CHLORIDE mmol/L 108 102 94*   CO2 mmol/L 24 24 24   ANION GAP mmol/L 9 9 15*   BUN mg/dL 20 16 20   CREATININE mg/dL 1 04 1 00 1 12   EGFR ml/min/1 73sq m 58 61 53   CALCIUM mg/dL 8 5 8 7 9 2   MAGNESIUM mg/dL 2 0  --   --      Results from last 7 days   Lab Units 05/25/21  1818   AST U/L 27   ALT U/L 35   ALK PHOS U/L 145*   TOTAL PROTEIN g/dL 8 3*   ALBUMIN g/dL 3 9   TOTAL BILIRUBIN mg/dL 0 52     Results from last 7 days   Lab Units 05/26/21  1203 05/26/21  0737 05/25/21 2231   POC GLUCOSE mg/dl 351* 382* 311*     Results from last 7 days   Lab Units 05/26/21  0549 05/25/21 2324 05/25/21  1818   GLUCOSE RANDOM mg/dL 323* 373* 534*     Results from last 7 days   Lab Units 05/25/21  1818   TROPONIN I ng/mL <0 02     Results from last 7 days   Lab Units 05/26/21  0549   TSH 3RD GENERATON uIU/mL 3 190     Results from last 7 days   Lab Units 05/25/21  2324   LACTIC ACID mmol/L 1 9     Results from last 7 days   Lab Units 05/25/21  1818   CLARITY UA  Clear   COLOR UA  Yellow   SPEC GRAV UA  1 010   PH UA  6 0   GLUCOSE UA mg/dl 500 (1/2%)*   KETONES UA mg/dl Negative   BLOOD UA  Negative   PROTEIN UA mg/dl Trace*   NITRITE UA  Negative   BILIRUBIN UA  Negative   UROBILINOGEN UA E U /dl 0 2   LEUKOCYTES UA  Negative   WBC UA /hpf 0-1   RBC UA /hpf None Seen   BACTERIA UA /hpf None Seen   EPITHELIAL CELLS WET PREP /hpf Occasional     ED Treatment:   Medication Administration from 05/25/2021 1628 to 05/25/2021 2202       Date/Time Order Dose Route Action Action by Comments     05/25/2021 1826 metoprolol succinate (TOPROL-XL) 24 hr tablet 50 mg 50 mg Oral Given Brit Aguilar RN      05/25/2021 1826 amLODIPine (NORVASC) tablet 5 mg 5 mg Oral Given Brit Aguilar RN      05/25/2021 1825 losartan (COZAAR) tablet 50 mg 50 mg Oral Given Brit Aguilar RN      05/25/2021 2102 insulin regular (HumuLIN R,NovoLIN R) injection 10 Units 10 Units Subcutaneous Given Shaquille Juarez RN      05/25/2021 2106 sodium chloride 0 9 % bolus 1,000 mL 1,000 mL Intravenous New Bag Shaquille Juarez RN         Past Medical History:   Diagnosis Date    Diabetes mellitus ([facility] )     Heart failure ([facility] )     Hypertension      Present on Admission:   HLD (hyperlipidemia)      Admitting Diagnosis: Dizziness [R42]  HTN (hypertension) [I10]  Hyperglycemia [R73 9]  Word finding difficulty [R47 89]  Uncontrolled type 2 diabetes mellitus with hyperglycemia ([facility] ) [E11 65]  Age/Sex: 64 y o  female  Admission Orders:  Scheduled Medications:  aspirin, 81 mg, Oral, Daily  atorvastatin, 40 mg, Oral, QPM  enoxaparin, 40 mg, Subcutaneous, Daily  insulin glargine, 20 Units, Subcutaneous, HS  insulin lispro, 1-5 Units, Subcutaneous, TID AC  insulin lispro, 1-5 Units, Subcutaneous, HS  insulin lispro, 7 Units, Subcutaneous, TID With Meals      Continuous IV Infusions:     PRN Meds:  acetaminophen, 650 mg, Oral, Q4H PRN    fingerstick ac and hs   PT O T speech eval   Neuro checks     IP CONSULT TO NEUROLOGY  IP CONSULT TO CASE MANAGEMENT  IP CONSULT TO NUTRITION SERVICES    Network Utilization Review Department  ATTENTION: Please call with any questions or concerns to [phone] and carefully listen to the prompts so that you are directed to the right person  All voicemails are confidential   Farrel Bodily all requests for admission clinical reviews, approved or denied determinations and any other requests to dedicated fax number below belonging to the campus where the patient is receiving treatment   List of dedicated fax numbers for the Facilities:  [address] DENIALS (Administrative/Medical Necessity) [phone]   [address] (Maternity/NICU/Pediatrics) [phone]   [address] [place] [address] [address] [place] [address] [id number] [place] [address] [address] [facility] [phone]

## 2021-05-26 NOTE — PLAN OF CARE
Problem: Potential for Falls  Goal: Patient will remain free of falls  Description: INTERVENTIONS:  - Assess patient frequently for physical needs  -  Identify cognitive and physical deficits and behaviors that affect risk of falls    -  [place] fall precautions as indicated by assessment   - Educate patient/family on patient safety including physical limitations  - Instruct patient to call for assistance with activity based on assessment  - Modify environment to reduce risk of injury  - Consider OT/PT consult to assist with strengthening/mobility  Outcome: Progressing     Problem: PAIN - ADULT  Goal: Verbalizes/displays adequate comfort level or baseline comfort level  Description: Interventions:  - Encourage patient to monitor pain and request assistance  - Assess pain using appropriate pain scale  - Administer analgesics based on type and severity of pain and evaluate response  - Implement non-pharmacological measures as appropriate and evaluate response  - Consider cultural and social influences on pain and pain management  - Notify physician/advanced practitioner if interventions unsuccessful or patient reports new pain  Outcome: Progressing     Problem: INFECTION - ADULT  Goal: Absence or prevention of progression during hospitalization  Description: INTERVENTIONS:  - Assess and monitor for signs and symptoms of infection  - Monitor lab/diagnostic results  - Monitor all insertion sites, i e  indwelling lines, tubes, and drains  - Monitor endotracheal if appropriate and nasal secretions for changes in amount and color  - [place] appropriate cooling/warming therapies per order  - Administer medications as ordered  - Instruct and encourage patient and family to use good hand hygiene technique  - Identify and instruct in appropriate isolation precautions for identified infection/condition  Outcome: Progressing  Goal: Absence of fever/infection during neutropenic period  Description: INTERVENTIONS:  - Monitor WBC    Outcome: Progressing     Problem: SAFETY ADULT  Goal: Patient will remain free of falls  Description: INTERVENTIONS:  - Assess patient frequently for physical needs  -  Identify cognitive and physical deficits and behaviors that affect risk of falls    -  [place] fall precautions as indicated by assessment   - Educate patient/family on patient safety including physical limitations  - Instruct patient to call for assistance with activity based on assessment  - Modify environment to reduce risk of injury  - Consider OT/PT consult to assist with strengthening/mobility  Outcome: Progressing  Goal: Maintain or return to baseline ADL function  Description: INTERVENTIONS:  -  Assess patient's ability to carry out ADLs; assess patient's baseline for ADL function and identify physical deficits which impact ability to perform ADLs (bathing, care of mouth/teeth, toileting, grooming, dressing, etc )  - Assess/evaluate cause of self-care deficits   - Assess range of motion  - Assess patient's mobility; develop plan if impaired  - Assess patient's need for assistive devices and provide as appropriate  - Encourage maximum independence but intervene and supervise when necessary  - Involve family in performance of ADLs  - Assess for home care needs following discharge   - Consider OT consult to assist with ADL evaluation and planning for discharge  - Provide patient education as appropriate  Outcome: Progressing  Goal: Maintain or return mobility status to optimal level  Description: INTERVENTIONS:  - Assess patient's baseline mobility status (ambulation, transfers, stairs, etc )    - Identify cognitive and physical deficits and behaviors that affect mobility  - Identify mobility aids required to assist with transfers and/or ambulation (gait belt, sit-to-stand, lift, walker, cane, etc )  - [place] fall precautions as indicated by assessment  - Record patient progress and toleration of activity level on Mobility SBAR; progress patient to next Phase/Stage  - Instruct patient to call for assistance with activity based on assessment  - Consider rehabilitation consult to assist with strengthening/weightbearing, etc   Outcome: Progressing     Problem: DISCHARGE PLANNING  Goal: Discharge to home or other facility with appropriate resources  Description: INTERVENTIONS:  - Identify barriers to discharge w/patient and caregiver  - Arrange for needed discharge resources and transportation as appropriate  - Identify discharge learning needs (meds, wound care, etc )  - Arrange for interpretive services to assist at discharge as needed  - Refer to Case Management Department for coordinating discharge planning if the patient needs post-hospital services based on physician/advanced practitioner order or complex needs related to functional status, cognitive ability, or social support system  Outcome: Progressing     Problem: Knowledge Deficit  Goal: Patient/family/caregiver demonstrates understanding of disease process, treatment plan, medications, and discharge instructions  Description: Complete learning assessment and assess knowledge base  Interventions:  - Provide teaching at level of understanding  - Provide teaching via preferred learning methods  Outcome: Progressing     Problem: Neurological Deficit  Goal: Neurological status is stable or improving  Description: Interventions:  - Monitor and assess patient's level of consciousness, motor function, sensory function, and level of assistance needed for ADLs  - Monitor and report changes from baseline  Collaborate with interdisciplinary team to initiate plan and implement interventions as ordered  - Provide and maintain a safe environment  - Consider seizure precautions  - Consider fall precautions  - Consider aspiration precautions  - Consider bleeding precautions  Outcome: Progressing     Problem:  Activity Intolerance/Impaired Mobility  Goal: Mobility/activity is maintained at optimum level for patient  Description: Interventions:  - Assess and monitor patient  barriers to mobility and need for assistive/adaptive devices  - Assess patient's emotional response to limitations  - Collaborate with interdisciplinary team and initiate plans and interventions as ordered  - Encourage independent activity per ability   - Maintain proper body alignment  - Perform active/passive rom as tolerated/ordered  - Plan activities to conserve energy   - Turn patient as appropriate  Outcome: Progressing     Problem: Communication Impairment  Goal: Ability to express needs and understand communication  Description: Assess patient's communication skills and ability to understand information  Patient will demonstrate use of effective communication techniques, alternative methods of communication and understanding even if not able to speak  - Encourage communication and provide alternate methods of communication as needed  - Collaborate with case management/ for discharge needs  - Include patient/family/caregiver in decisions related to communication  Outcome: Progressing     Problem: Potential for Aspiration  Goal: Non-ventilated patient's risk of aspiration is minimized  Description: Assess and monitor vital signs, respiratory status, and labs (WBC)  Monitor for signs of aspiration (tachypnea, cough, rales, wheezing, cyanosis, fever)  - Assess and monitor patient's ability to swallow  - Place patient up in chair to eat if possible  - HOB up at 90 degrees to eat if unable to get patient up into chair   - Supervise patient during oral intake  - Instruct patient/ family to take small bites  - Instruct patient/ family to take small single sips when taking liquids    - Follow patient-specific strategies generated by speech pathologist   Outcome: Progressing  Goal: Ventilated patient's risk of aspiration is minimized  Description: Assess and monitor vital signs, respiratory status, airway cuff pressure, and labs (WBC)  Monitor for signs of aspiration (tachypnea, cough, rales, wheezing, cyanosis, fever)  - Elevate head of bed 30 degrees if patient has tube feeding   - Monitor tube feeding  Outcome: Progressing     Problem: Nutrition  Goal: Nutrition/Hydration status is improving  Description: Monitor and assess patient's nutrition/hydration status for malnutrition (ex- brittle hair, bruises, dry skin, pale skin and conjunctiva, muscle wasting, smooth red tongue, and disorientation)  Collaborate with interdisciplinary team and initiate plan and interventions as ordered  Monitor patient's weight and dietary intake as ordered or per policy  Utilize nutrition screening tool and intervene per policy  Determine patient's food preferences and provide high-protein, high-caloric foods as appropriate  - Assist patient with eating   - Allow adequate time for meals   - Encourage patient to take dietary supplement as ordered  - Collaborate with clinical nutritionist   - Include patient/family/caregiver in decisions related to nutrition    Outcome: Progressing

## 2021-05-26 NOTE — ASSESSMENT & PLAN NOTE
· SBP 190s on presentation  · Patient has not taken her medications the past 1 week as she ran out of them  · Received her home medications in the ED, amlodipine 5 mg, losartan 50 mg and metoprolol succinate 50 mg and BP has improved  · Avoid lowering BP further at this time in the setting of possible TIA  Home AM doses of the above medications to allow for permissive HTN  · Continue to monitor BP closely

## 2021-05-26 NOTE — ASSESSMENT & PLAN NOTE
- Lipid Profile 05/26/2021:  Total Cholesterol 283 / Triglycerides 249 / HDL 33 /   - PTA on losartan 50 mg daily, will discontinue and start the patient on atorvastatin 80 mg daily

## 2021-05-26 NOTE — OCCUPATIONAL THERAPY NOTE
Occupational Therapy Screen Note         Patient Name: Lanny Flores Date: 5/26/2021 05/26/21 0902   OT Last Visit   OT Visit Date 05/26/21   Note Type   Note type Screen   Activity Tolerance   Medical Staff Made Aware PT ANTONIO Miller Puja   Assessment   Assessment OT orders received  Chart received performed  Based on conversation with nursing and PT, pt appears to be performing at functional baseline  OT performed screen and discussed results with patient  Pt does not demonstrate need for skilled OT at this time  Pt will not be seen further by OT services  DC OT orders  If pt's functional status declines please re-consult                Chaparro Warner OTR/L

## 2021-05-26 NOTE — ASSESSMENT & PLAN NOTE
No results found for: HGBA1C    No results for input(s): POCGLU in the last 72 hours  Blood Sugar Average: Last 72 hrs:      · Blood glucose 534 on BMP on presentation  · Patient reports that she ran out of her medications about 1 week ago  Her home medication regimen includes Janumet and Amaryl  She admits to polydipsia and polyuria over the past few days  · AG noted to be elevated to 15 on presentation but urine negative for ketones  · Received 10 units of insulin in the ED as well as a 1L bolus of NS    · Monitor BG AC and HS  · Consider insulin drip if no improvement with above  · SSI for coverage  Hold oral medications while inpatient  · Check Hg A1c

## 2021-05-27 PROBLEM — I50.9 CHF (CONGESTIVE HEART FAILURE) (HCC): Status: ACTIVE | Noted: 2021-05-27

## 2021-05-27 LAB
ANION GAP SERPL CALCULATED.3IONS-SCNC: 13 MMOL/L (ref 4–13)
BUN SERPL-MCNC: 22 MG/DL (ref 5–25)
CALCIUM SERPL-MCNC: 8.6 MG/DL (ref 8.3–10.1)
CHLORIDE SERPL-SCNC: 106 MMOL/L (ref 100–108)
CO2 SERPL-SCNC: 24 MMOL/L (ref 21–32)
CREAT SERPL-MCNC: 1.06 MG/DL (ref 0.6–1.3)
GFR SERPL CREATININE-BSD FRML MDRD: 57 ML/MIN/1.73SQ M
GLUCOSE SERPL-MCNC: 222 MG/DL (ref 65–140)
GLUCOSE SERPL-MCNC: 240 MG/DL (ref 65–140)
GLUCOSE SERPL-MCNC: 240 MG/DL (ref 65–140)
GLUCOSE SERPL-MCNC: 256 MG/DL (ref 65–140)
GLUCOSE SERPL-MCNC: 334 MG/DL (ref 65–140)
MAGNESIUM SERPL-MCNC: 2 MG/DL (ref 1.6–2.6)
POTASSIUM SERPL-SCNC: 3.8 MMOL/L (ref 3.5–5.3)
SODIUM SERPL-SCNC: 143 MMOL/L (ref 136–145)

## 2021-05-27 PROCEDURE — 82948 REAGENT STRIP/BLOOD GLUCOSE: CPT

## 2021-05-27 PROCEDURE — 99222 1ST HOSP IP/OBS MODERATE 55: CPT | Performed by: INTERNAL MEDICINE

## 2021-05-27 PROCEDURE — 83735 ASSAY OF MAGNESIUM: CPT | Performed by: INTERNAL MEDICINE

## 2021-05-27 PROCEDURE — 80048 BASIC METABOLIC PNL TOTAL CA: CPT | Performed by: INTERNAL MEDICINE

## 2021-05-27 PROCEDURE — 99232 SBSQ HOSP IP/OBS MODERATE 35: CPT | Performed by: INTERNAL MEDICINE

## 2021-05-27 RX ORDER — INSULIN GLARGINE 100 [IU]/ML
30 INJECTION, SOLUTION SUBCUTANEOUS
Status: DISCONTINUED | OUTPATIENT
Start: 2021-05-27 | End: 2021-05-27

## 2021-05-27 RX ORDER — INSULIN GLARGINE 100 [IU]/ML
35 INJECTION, SOLUTION SUBCUTANEOUS
Status: DISCONTINUED | OUTPATIENT
Start: 2021-05-27 | End: 2021-05-28 | Stop reason: HOSPADM

## 2021-05-27 RX ORDER — INSULIN GLARGINE 100 [IU]/ML
38 INJECTION, SOLUTION SUBCUTANEOUS
Status: DISCONTINUED | OUTPATIENT
Start: 2021-05-27 | End: 2021-05-27

## 2021-05-27 RX ORDER — POTASSIUM CHLORIDE 20 MEQ/1
20 TABLET, EXTENDED RELEASE ORAL ONCE
Status: COMPLETED | OUTPATIENT
Start: 2021-05-27 | End: 2021-05-27

## 2021-05-27 RX ADMIN — INSULIN LISPRO 1 UNITS: 100 INJECTION, SOLUTION INTRAVENOUS; SUBCUTANEOUS at 22:09

## 2021-05-27 RX ADMIN — INSULIN LISPRO 4 UNITS: 100 INJECTION, SOLUTION INTRAVENOUS; SUBCUTANEOUS at 12:43

## 2021-05-27 RX ADMIN — ATORVASTATIN CALCIUM 80 MG: 40 TABLET, FILM COATED ORAL at 17:53

## 2021-05-27 RX ADMIN — AMLODIPINE BESYLATE 5 MG: 5 TABLET ORAL at 09:07

## 2021-05-27 RX ADMIN — INSULIN LISPRO 2 UNITS: 100 INJECTION, SOLUTION INTRAVENOUS; SUBCUTANEOUS at 09:14

## 2021-05-27 RX ADMIN — POTASSIUM CHLORIDE 20 MEQ: 1500 TABLET, EXTENDED RELEASE ORAL at 15:57

## 2021-05-27 RX ADMIN — ENOXAPARIN SODIUM 40 MG: 40 INJECTION SUBCUTANEOUS at 09:07

## 2021-05-27 RX ADMIN — INSULIN LISPRO 2 UNITS: 100 INJECTION, SOLUTION INTRAVENOUS; SUBCUTANEOUS at 17:53

## 2021-05-27 RX ADMIN — LOSARTAN POTASSIUM 50 MG: 50 TABLET, FILM COATED ORAL at 09:07

## 2021-05-27 RX ADMIN — ASPIRIN 81 MG: 81 TABLET, CHEWABLE ORAL at 09:07

## 2021-05-27 RX ADMIN — INSULIN GLARGINE 35 UNITS: 100 INJECTION, SOLUTION SUBCUTANEOUS at 22:09

## 2021-05-27 RX ADMIN — METOPROLOL SUCCINATE 50 MG: 50 TABLET, EXTENDED RELEASE ORAL at 09:07

## 2021-05-27 RX ADMIN — ACETAMINOPHEN 650 MG: 325 TABLET, FILM COATED ORAL at 09:13

## 2021-05-27 NOTE — ASSESSMENT & PLAN NOTE
Lab Results   Component Value Date    HGBA1C >14 0 (H) 05/26/2021        Recent Labs     05/26/21  0737 05/26/21  1203 05/26/21  1555 05/26/21  2106   POCGLU 382* 351* 345* 257*       Blood Sugar Average: Last 72 hrs:  (P) 329 2

## 2021-05-27 NOTE — ASSESSMENT & PLAN NOTE
· QTc prolonged at 490 on EKG  Prior EKG from 1/2013 with QTc of 472    · Patient is on Lexapro as an outpatient which will be held   · Avoid QT prolonging medications    Recent Labs     05/25/21  1659 05/26/21  0018 05/26/21  0019   QTCINT [phone]

## 2021-05-27 NOTE — ASSESSMENT & PLAN NOTE
· Presented after an episode of word finding difficulty, blurry vision and dizziness  Symptoms reportedly lasted about an hour and a half prior to resolving  Most likely the her symptoms were due to hypertensive urgency vs symptomatic hyperglycemia  Unlikely TIA but cannot fully rule it out  · CTA head and neck was a normal study  · Stroke pathway  · Monitor neuro checks per protocol and notify of any changes  · Echo 9/93: Systolic function mildly reduced  EF 45 % to 50 %, mildly increased LV wall, grade 1 diastolic dysfunction  No evidence of apical thrombus  · Neurology consulted  Per Neurology they will defer MRI brain due to nonfocal exam and symptoms likely being secondary to HTN and metabolic disturbances  · T9K 5/26/2021: >14  · Lipid Profile 05/26/2021:  Total Cholesterol 283 / Triglycerides 249 / HDL 33 /   · BP elevated on presentation, will restart BP medications

## 2021-05-27 NOTE — CASE MANAGEMENT
LOS: 1  Readmission: none  Bundle: none  Unplanned Readmission Risk: 8 (green)    Patient admitted due to hyperglycemia, dizziness, slurred speech  Met with patient at bedside to complete assessment  Patient presents as alert, oriented x4  Introduced self and explained role of case management  Patient states that she lives at home with her daughter, and grandchildren (10yo and 3yo)  Relays that she was previously independent in ADLs an ambulation  Has an old glucometer at home, but it does not work  Denies any other DME in the home  Patient works, drives  No history of home care services or rehab  Confirms that she has Rx coverage and uses [facility] in [place] - this is her preference for meds at discharge  Patient to have daughter bring in Rx insurance card as patient does not believe pharmacy has updated Rx information  Will follow to ensure medication coverage, as she will likely be on insulin at d/c  Endocrinology consult pending  Patient reports history of anxiety and depression which she takes medication for - managed by PCP  Denies any history of D&A use or psych placement  Patient's family to transport home once medically stable  Will continue to follow for d/c planning needs       NIYA Jasso  5/27/2021  5:08 PM

## 2021-05-27 NOTE — ASSESSMENT & PLAN NOTE
· Na 133 on presentation, corrected Na for hyperglycemia is 140     · Resolved    Results from last 7 days   Lab Units 05/27/21  0542 05/26/21  0549 05/25/21  2324   SODIUM mmol/L 143 141 135*

## 2021-05-27 NOTE — ASSESSMENT & PLAN NOTE
Lab Results   Component Value Date    HGBA1C >14 0 (H) 2021       Recent Labs     21  0737 21  1203 21  1555 21  2106   POCGLU 382* 351* 345* 257*       Blood Sugar Average: Last 72 hrs:  (P) 329 2    · Blood glucose 534 on BMP on presentation  · Patient reports that she ran out of her medications about 1 week ago  Her home medication regimen includes Janumet and Amaryl  She admits to polydipsia and polyuria over the past few days  · AG noted to be elevated to 15 on presentation but urine negative for ketones     · Received 10 units of insulin in the ED as well as a 1L bolus of NS    · A1c 2021: >14  · PTA on: Glimepiride 4mg BID, sitagliptin-metformin 50-1000mg QD    Plan:   Hold oral antihyperglycemics   Insulin regimen  o Short acting: Humalog 10 units before meals  o Long actin units of Lantus q h s   o Sliding Scale Insulin   Goal Blood Glucose 140-180   Glucose checks   Monitor for hypoglycemia and treat per protocol   Diabetic Diet: Consistent CHO Level 2 (5 CHO servings/75g CHO per meal)   Endocrinology consulted, appreciate recommendations

## 2021-05-27 NOTE — ASSESSMENT & PLAN NOTE
· SBP 190s on presentation  · Patient reports that she has not taken her medications the past 1 week as she ran out of them  · Received her home medications in the ED, amlodipine 5 mg, losartan 50 mg and metoprolol succinate 50 mg and BP has improved  · Will restart all of her PTA antihypertensives now that stroke has been determined to be unlikely    · Continue to monitor BP closely

## 2021-05-27 NOTE — ASSESSMENT & PLAN NOTE
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% that subsequently normalized to 50% following medical therapy  - Prior Studies:   - 8/21/15: Stress ECHO: The hemodynamic response to exercise was normal  No cp with exercise  No ekg changes diagnostic for ischemia  No evidence for ischemia on perfusion imaging  Normal LV systolic function  - 1/17/2016 Cardiac Cath: LVEF 25-30 % and no significant evidence of CAD     - 1/17/2016 Echocardiogram: LVEF 35%-40%  Trace MR and trace TR  Normal IVC   - 02/04/2016 Echocardiogram: LVEF 50% - no valvular disease  - Echo 2/24/2822: Systolic function mildly reduced  EF 45 % to 50 %, mildly increased LV wall, grade 1 diastolic dysfunction  No evidence of apical thrombus    - Pt is on amlodipine, metoprolol, and losartan, continue  - Patient is not reporting any symptoms of CHF and appears euvolemic  - Pt advised to follow HF diet, 2 gram Na diet, fluid restriction and daily weights  - Pt to follow up with her previous cardiologist at [facility] on D/C

## 2021-05-27 NOTE — CONSULTS
Consultation - Cardiology Team One  Gisele Garcia 64 y o  female MRN: [id number]  Unit/Bed#: S -01 Encounter: [id number]    Inpatient consult to Cardiology  Consult performed by: EMANUEL Pena  Consult ordered by: Crispin Owen MD      Physician Requesting Consult: Crispin Owen MD  Reason for Consult / Principal Problem: NSVT, heart failure      Assessment/ Plan    1  Non sustained ventricular tachycardia  Reviewed telemetry showing normal sinus rhythm with occasional runs of nonsustained VT  Patient asymptomatic   ECG reviewed showing normal sinus rhythm with T-wave abnormalities in the inferior and anterolateral leads  Goal keep K > 4 0 and Mg > 2 0   Continue Metoprolol XL   Consider nuclear stress test as an outpatient  Will discuss with attending  2  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   Echocardiogram 05/26/2021 showed EF 45 69%, grade 1 diastolic dysfunction, RV systolic function mildly reduced and no significant valvular disease  Patient appears euvolemic  Reviewed HF education with patient including 2 gram Na diet, fluid restriction and daily weights   She will follow up with Dr Quince Simmonds from [facility]   Continue losartan and metoprolol XL    3  Hypertensive urgency  /117 POA   BP now improved: 127/76  On amlodipine 5 mg PO daily, losartan 50 mg PO daily and metoprolol XL 50 mg PO daily     4  Uncontrolled type II diabetes   Hemoglobin A1C > 14  Followed by primary team on lantus and humalog     5  Hyperlipidemia    Continue high-intensity statin:  Atorvastatin 80 mg p o  Daily        History of Present Illness   HPI: Madi Eduardo is a 64y o  year old female who has hypertension, hyperlipidemia, type 2 diabetes and viral cardiomyopathy with recovered EF    She follows with cardiologist Dr Quince Simmonds but last seen in 2018           She presents to [facility] ER 05/25/2021 with complaint of word-finding difficulty and weakness  Blood pressure on admission was 191/117  Patient reports she ran out of her antihypertensives medications about 1 week ago  She was restarted on amlodipine, losartan and metoprolol with improvement of blood pressure and resolution of word-finding difficulty and weakness  Patient reports no complaint of chest pain, shortness of breath or palpitations  Cardiology consulted for nonsustained ventricular tachycardia and chronic heart failure  As noted above, patient has no complaint of palpitations  She did note dizziness on admission in the setting of hypertensive urgency  Dizziness has now resolved  She denies syncope or near syncope  Patient reports history of viral cardiomyopathy in 2016  EF has now improved to 45- 50%  She notes she was prescribed a diuretic but has not been taking  Patient appears euvolemic on exam      Echocardiogram 05/26/2021 showed EF 00-46%, grade 1 diastolic dysfunction, RV systolic function mildly reduced and no significant valvular disease  Patient lives independently at home  She works full-time in a  department at a school  She reports no chest pain at rest or with exertion  She denies tobacco abuse  She drinks alcohol socially  She has a family history of mother having CABG in her 46s  Cardiac catheterization 01/17/2016 showed no evidence of significant CAD per [facility] records  EKG reviewed personally:  Normal sinus rhythm with nonspecific ST and T-wave abnormalities  Ventricular rate 73 beats per minute  LA interval 170 milliseconds  QRS D 76 milliseconds  QT interval 444 millisecond   milliseconds    Telemetry reviewed personally:   Normal sinus rhythm with occasional runs of nonsustained ventricular tachycardia    Review of Systems   Constitution: Negative for chills, fever and malaise/fatigue  HENT: Negative for congestion      Cardiovascular: Negative for chest pain, dyspnea on exertion, leg swelling, orthopnea and palpitations  Respiratory: Negative for cough and shortness of breath  Musculoskeletal: Negative for falls  Gastrointestinal: Negative for bloating, nausea and vomiting  Neurological: Positive for headaches  Psychiatric/Behavioral: Negative for altered mental status  All other systems reviewed and are negative  Historical Information   Past Medical History:   Diagnosis Date    Diabetes mellitus ([facility] )     Heart failure ([facility] )     Hypertension      History reviewed  No pertinent surgical history  Social History     Substance and Sexual Activity   Alcohol Use Yes     Social History     Substance and Sexual Activity   Drug Use Never     Social History     Tobacco Use   Smoking Status Former Smoker   Smokeless Tobacco Former User     Family History: History reviewed  No pertinent family history      Meds/Allergies   all current active meds have been reviewed and current meds:   Current Facility-Administered Medications   Medication Dose Route Frequency    acetaminophen (TYLENOL) tablet 650 mg  650 mg Oral Q4H PRN    amLODIPine (NORVASC) tablet 5 mg  5 mg Oral Daily    aspirin chewable tablet 81 mg  81 mg Oral Daily    atorvastatin (LIPITOR) tablet 80 mg  80 mg Oral QPM    enoxaparin (LOVENOX) subcutaneous injection 40 mg  40 mg Subcutaneous Daily    insulin glargine (LANTUS) subcutaneous injection 30 Units 0 3 mL  30 Units Subcutaneous HS    insulin lispro (HumaLOG) 100 units/mL subcutaneous injection 1-5 Units  1-5 Units Subcutaneous TID AC    insulin lispro (HumaLOG) 100 units/mL subcutaneous injection 1-5 Units  1-5 Units Subcutaneous HS    insulin lispro (HumaLOG) 100 units/mL subcutaneous injection 10 Units  10 Units Subcutaneous TID With Meals    losartan (COZAAR) tablet 50 mg  50 mg Oral Daily    metoprolol succinate (TOPROL-XL) 24 hr tablet 50 mg  50 mg Oral Daily          No Known Allergies    Objective   Vitals: Blood pressure 127/76, pulse 74, temperature 98 °F (36 7 °C), temperature source Oral, resp  rate 16, height 5' 5" (1 651 m), weight 69 kg (152 lb 3 2 oz), SpO2 94 %  ,     Body mass index is 25 33 kg/m²  ,     Systolic (97TLB), QAX:068 , Min:115 , TJW:561     Diastolic (88ODO), ITA:43, Min:65, Max:76            Intake/Output Summary (Last 24 hours) at 5/27/2021 0944  Last data filed at 5/26/2021 2105  Gross per 24 hour   Intake 240 ml   Output 0 ml   Net 240 ml     Weight (last 2 days)     Date/Time   Weight    05/27/21 0600   69 (152 2)    05/26/21 0553   68 2 (150 35)    05/25/21 2204   68 2 (150 4)    05/25/21 1650   69 1 (152 34)            Invasive Devices     Peripheral Intravenous Line            Peripheral IV 05/25/21 Right Antecubital 1 day                  Physical Exam  Constitutional:       General: She is not in acute distress  HENT:      Head: Normocephalic  Mouth/Throat:      Mouth: Mucous membranes are moist    Neck:      Musculoskeletal: Neck supple  Cardiovascular:      Rate and Rhythm: Normal rate and regular rhythm  Pulses: Normal pulses  Heart sounds: No murmur  Pulmonary:      Effort: Pulmonary effort is normal  No respiratory distress  Breath sounds: Normal breath sounds  Abdominal:      General: Bowel sounds are normal       Palpations: Abdomen is soft  Musculoskeletal:         General: No swelling  Skin:     General: Skin is warm and dry  Capillary Refill: Capillary refill takes less than 2 seconds  Neurological:      General: No focal deficit present  Mental Status: She is alert and oriented to person, place, and time     Psychiatric:         Mood and Affect: Mood normal            LABORATORY RESULTS:  Results from last 7 days   Lab Units 05/25/21  1818   TROPONIN I ng/mL <0 02     CBC with diff:   Results from last 7 days   Lab Units 05/26/21  0549 05/25/21  2324 05/25/21  1818   WBC Thousand/uL 8 12  --  7 83   HEMOGLOBIN g/dL 14 0  --  16 1*   HEMATOCRIT % 42 7  --  47 3*   MCV fL 90  --  88   PLATELETS Thousands/uL 201 238 197   MCH pg 29 5  --  29 9   MCHC g/dL 32 8  --  34 0   RDW % 12 7  --  12 6   MPV fL 10 2 10 1 10 5   NRBC AUTO /100 WBCs  --   --  0       CMP:  Results from last 7 days   Lab Units 21  0542 21  0549 21  1818   POTASSIUM mmol/L 3 8 4 5 3 5 4 1   CHLORIDE mmol/L 106 108 102 94*   CO2 mmol/L 24 24 24 24   BUN mg/dL 22 20 16 20   CREATININE mg/dL 1 06 1 04 1 00 1 12   CALCIUM mg/dL 8 6 8 5 8 7 9 2   AST U/L  --   --   --  27   ALT U/L  --   --   --  35   ALK PHOS U/L  --   --   --  145*   EGFR ml/min/1 73sq m 57 58 61 53       BMP:  Results from last 7 days   Lab Units 21  0542 21  0549 21  1818   POTASSIUM mmol/L 3 8 4 5 3 5 4 1   CHLORIDE mmol/L 106 108 102 94*   CO2 mmol/L 24 24 24 24   BUN mg/dL 22 20 16 20   CREATININE mg/dL 1 06 1 04 1 00 1 12   CALCIUM mg/dL 8 6 8 5 8 7 9 2     Results from last 7 days   Lab Units 21  0542 21  0549   MAGNESIUM mg/dL 2 0 2 0     Results from last 7 days   Lab Units 21  0549   HEMOGLOBIN A1C % >14 0*          Results from last 7 days   Lab Units 21  0549   TSH 3RD GENERATON uIU/mL 3 190           Lipid Profile:   No results found for: CHOL  Lab Results   Component Value Date    HDL 33 (L) 2021     Lab Results   Component Value Date    LDLCALC 200 (H) 2021     Lab Results   Component Value Date    TRIG 249 (H) 2021     Cardiac testing:   Results for orders placed during the hospital encounter of 21   Echo complete with contrast if indicated    Narrative [facility], [address] [facility]  [phone]    Transthoracic Echocardiogram  2D, M-mode, Doppler, and Color Doppler    Study date:  26-May-2021    Patient: Renée Webster  MR number: [id number]  Account number: [de-identified]  : 1960  Age: 64 years  Gender: Female  Status: Inpatient  Location: Bedside  Height: 65 in  Weight: 149 6 lb  BP: 115/ 67 mmHg    Indications: TIA    Diagnoses: G45 9 - Transient cerebral ischemic attack, unspecified    Sonographer:  Issac Merlin, RDCS  Primary Physician:  Warden Reinaldo MD  Referring Physician:  Suzette Cunningham PA-C  Group:  [facility]  Interpreting Physician:  Tyesha Lubin MD    IMPRESSIONS:  There was no echocardiographic evidence for a cardiac source of embolism  SUMMARY    LEFT VENTRICLE:  Systolic function was mildly reduced  Ejection fraction was estimated in the range of 45 % to 50 %  Wall thickness was mildly increased  Doppler parameters were consistent with abnormal left ventricular relaxation (grade 1 diastolic dysfunction)  No evidence of apical thrombus  RIGHT VENTRICLE:  Systolic function was mildly reduced  LEFT ATRIUM:  No thrombus was identified  ATRIAL SEPTUM:  No defect or patent foramen ovale was identified  MITRAL VALVE:  There was trace regurgitation  AORTIC VALVE:  There was no evidence for stenosis  HISTORY: PRIOR HISTORY: DM2, HTN, HF    PROCEDURE: The procedure was performed at the bedside  This was a routine study  The transthoracic approach was used  The study included complete 2D imaging, M-mode, complete spectral Doppler, and color Doppler  The heart rate was 67 bpm,  at the start of the study  Images were obtained from the parasternal, apical, subcostal, and suprasternal notch acoustic windows  Image quality was adequate  LEFT VENTRICLE: Size was normal  Systolic function was mildly reduced  Ejection fraction was estimated in the range of 45 % to 50 %  Wall thickness was mildly increased  No evidence of apical thrombus  DOPPLER: Doppler parameters were  consistent with abnormal left ventricular relaxation (grade 1 diastolic dysfunction)  RIGHT VENTRICLE: The size was normal  Systolic function was mildly reduced  Wall thickness was normal     LEFT ATRIUM: Size was normal  No thrombus was identified      ATRIAL SEPTUM: No defect or patent foramen ovale was identified  RIGHT ATRIUM: Size was normal     MITRAL VALVE: Valve structure was normal  There was normal leaflet separation  DOPPLER: The transmitral velocity was within the normal range  There was no evidence for stenosis  There was trace regurgitation  AORTIC VALVE: The valve was trileaflet  Leaflets exhibited normal thickness and normal cuspal separation  DOPPLER: Transaortic velocity was within the normal range  There was no evidence for stenosis  There was no significant  regurgitation  TRICUSPID VALVE: The valve structure was normal  There was normal leaflet separation  DOPPLER: The transtricuspid velocity was within the normal range  There was no evidence for stenosis  There was no significant regurgitation  PULMONIC VALVE: Not well visualized  DOPPLER: The transpulmonic velocity was within the normal range  There was no significant regurgitation  PERICARDIUM: There was no pericardial effusion  The pericardium was normal in appearance  AORTA: The root exhibited normal size  SYSTEMIC VEINS: IVC: The inferior vena cava was normal in size  Respirophasic changes were normal     SYSTEM MEASUREMENT TABLES    2D  %FS: 11 09 %  Ao Diam: 2 68 cm  EDV(Teich): 116 38 ml  EF Biplane: 50 01 %  EF(Teich): 24 03 %  ESV(Teich): 88 41 ml  IVSd: 1 1 cm  LA Diam: 3 74 cm  LAAs A4C: 16 69 cm2  LAESV A-L A4C: 55 67 ml  LAESV MOD A4C: 52 68 ml  LALs A4C: 4 25 cm  LVEDV MOD A2C: 87 82 ml  LVEDV MOD A4C: 86 17 ml  LVEDV MOD BP: 87 88 ml  LVEDVInd MOD BP: 50 21 ml/m2  LVEF MOD A2C: 51 89 %  LVEF MOD A4C: 46 69 %  LVESV MOD A2C: 42 25 ml  LVESV MOD A4C: 45 94 ml  LVESV MOD BP: 43 93 ml  LVESVInd MOD BP: 25 1 ml/m2  LVIDd: 4 97 cm  LVIDs: 4 42 cm  LVLd A2C: 8 08 cm  LVLd A4C: 7 9 cm  LVLs A2C: 6 95 cm  LVLs A4C: 6 95 cm  LVPWd: 0 86 cm  RVIDd: 2 68 cm  SV MOD A2C: 45 56 ml  SV MOD A4C: 40 23 ml  SV(Teich): 27 96 ml    CW  AV Env  Ti: 338 73 ms  AV MaxP 3 mmHg  AV VTI: 30 18 cm  AV Vmax: 1 15 m/s  AV Vmean: 0 89 m/s  AV meanPG: 3 52 mmHg    MM  TAPSE: 1 36 cm    PW  E' Sept: 0 05 m/s  E/E' Sept: 15 03  LVOT Env  Ti: 300 67 ms  LVOT VTI: 14 82 cm  LVOT Vmax: 0 73 m/s  LVOT Vmean: 0 49 m/s  LVOT maxP 11 mmHg  LVOT meanP 12 mmHg  MV A Smith: 1 15 m/s  MV Dec Carlton: 3 02 m/s2  MV DecT: 226 94 ms  MV E Smith: 0 69 m/s  MV E/A Ratio: 0 59  MV PHT: 65 81 ms  MVA By PHT: 3 34 cm2    [employer] Accredited Echocardiography Laboratory    Prepared and electronically signed by    Tyesha Lubin MD  Signed 26-May-2021 14:39:30       Imaging:   Cta Head And Neck With And Without Contrast    Result Date: 2021  Narrative: CTA NECK AND BRAIN WITH AND WITHOUT CONTRAST INDICATION: TIA, initial exam dizziness, word finding difficulty, resolved  Concern for TIA COMPARISON:   None  TECHNIQUE:  Routine CT imaging of the Brain without contrast   Post contrast imaging was performed after administration of iodinated contrast through the neck and brain  Post contrast axial 0 625 mm images timed to opacify the arterial system  3D rendering was performed on an independent workstation  MIP reconstructions performed  Coronal reconstructions were performed of the noncontrast portion of the brain  Radiation dose length product (DLP) for this visit:  778 mGy-cm   This examination, like all CT scans performed in the [facility], was performed utilizing techniques to minimize radiation dose exposure, including the use of iterative reconstruction and automated exposure control  IV Contrast:  85 mL of iohexol (OMNIPAQUE)  IMAGE QUALITY:   Diagnostic FINDINGS: NONCONTRAST BRAIN PARENCHYMA:  No intracranial mass, mass effect or midline shift  No CT signs of acute infarction  No acute parenchymal hemorrhage  VENTRICLES AND EXTRA-AXIAL SPACES:  Normal for the patient's age  VISUALIZED ORBITS AND PARANASAL SINUSES:  Unremarkable   CERVICAL VASCULATURE AORTIC ARCH AND GREAT VESSELS:  Normal aortic arch and great vessel origins  Normal visualized subclavian vessels  RIGHT VERTEBRAL ARTERY CERVICAL SEGMENT:  Normal origin  The vessel is normal in caliber throughout the neck  LEFT VERTEBRAL ARTERY CERVICAL SEGMENT:  Normal origin  The vessel is normal in caliber throughout the neck  RIGHT EXTRACRANIAL CAROTID SEGMENT:  Normal caliber common carotid artery  Normal bifurcation and cervical internal carotid artery  No stenosis or dissection  LEFT EXTRACRANIAL CAROTID SEGMENT:  Normal caliber common carotid artery  Normal bifurcation and cervical internal carotid artery  No stenosis or dissection  NASCET criteria was used to determine the degree of internal carotid artery diameter stenosis  INTRACRANIAL VASCULATURE INTERNAL CAROTID ARTERIES:  Normal enhancement of the intracranial portions of the internal carotid arteries  Normal ophthalmic artery origins  Normal ICA terminus  ANTERIOR CIRCULATION:  Symmetric A1 segments and anterior cerebral arteries with normal enhancement  Normal anterior communicating artery  MIDDLE CEREBRAL ARTERY CIRCULATION:  M1 segment and middle cerebral artery branches demonstrate normal enhancement bilaterally  DISTAL VERTEBRAL ARTERIES:  Normal distal vertebral arteries  Posterior inferior cerebellar artery origins are normal  Normal vertebral basilar junction  BASILAR ARTERY:  Basilar artery is normal in caliber  Normal superior cerebellar arteries  POSTERIOR CEREBRAL ARTERIES: Both posterior cerebral arteries arises from the basilar tip  Both arteries demonstrate normal enhancement  DURAL VENOUS SINUSES:  Normal  NON VASCULAR ANATOMY BONY STRUCTURES:  No acute osseous abnormality  SOFT TISSUES OF THE NECK:  Normal  THORACIC INLET:  Unremarkable  Impression: Normal CT of the brain  Normal CTA of the neck and brain  Workstation performed: [id number]     Thank you for allowing us to participate in this patient's care  This pt will follow up with          once discharged  Counseling / Coordination of Care  Total floor / unit time spent today 45 minutes  Greater than 50% of total time was spent with the patient and / or family counseling and / or coordination of care  A description of the counseling / coordination of care: Review of history, current assessment, development of a plan  Code Status: Level 1 - Full Code    ** Please Note: Dragon 360 Dictation voice to text software may have been used in the creation of this document   **

## 2021-05-27 NOTE — CONSULTS
Consultation - Kelsey Leida Garcia 64 y o  female MRN: [id number]    Unit/Bed#: S -01 Encounter: [id number]      Assessment/Plan   1  Type 2 diabetes mellitus normal insulin therapy with hyperglycemia  Poorly controlled with A1c >14%  Recommend the following for now  - increase Lantus to 35 units subcutaneously at bedtime  -increase Humalog to 12 units meals 3 times a day  -continue sliding scale insulin  -will continue follow any changes needed    - insulin teaching  - will need Glucometer, supplies on discharge  as long as renal function remains within normal limits, would resume metformin on discharge  Recommend follow-up with Endocrinology as an outpatient    2  Hypertension-continue current medications  3  Hyperlipidemia-continue atorvastatin      CC: Diabetes Consult    History of Present Illness     HPI: Jana Garcia is a 64y o  year old female with past medical history of type 2 diabetes mellitus hypertension, hyperlipidemia, CHF presented with word-finding difficulty and dizziness  From presentation, had hypertensive urgency with blood pressure 191/117  Hyperglycemia with blood sugar > 500mg/dl , not in DKA     Diagnosed with diabetes mellitus approximately 5-6 years ago  Was following up with her primary care physician    She is on oral agents at home  Janumet  mg orally twice a day  Did not check sugars at home    Ran out of medications approximately 1 week ago  Endorses symptoms of polyuria, polydipsia, blurry vision  She denies neuropathy, retinopathy, heart attack and stroke but does admit to nephropathy  She denies any hypoglycemia  At this time states that she feels better and his symptoms are improving  Is taking orally well    All other systems were reviewed and are negative           Inpatient consult to Endocrinology  Consult performed by: Gladys Cuenca MD  Consult ordered by: Hoda Bautista DO          Review of Systems    Historical Information   Past Medical History:   Diagnosis Date    [facility] mellitus [facility]     Heart failure ([facility]     Hypertension      History reviewed  No pertinent surgical history  Social History   Social History     Substance and Sexual Activity   Alcohol Use Yes     Social History     Substance and Sexual Activity   Drug Use Never     Social History     Tobacco Use   Smoking Status Former Smoker   Smokeless Tobacco Former User     Family History: History reviewed  No pertinent family history      Meds/Allergies   Current Facility-Administered Medications   Medication Dose Route Frequency Provider Last Rate Last Admin    acetaminophen (TYLENOL) tablet 650 mg  650 mg Oral Q4H PRN Moris Ochoa PA-C   650 mg at 05/27/21 0913    amLODIPine (NORVASC) tablet 5 mg  5 mg Oral Daily [facility], DO   5 mg at 05/27/21 0625    aspirin chewable tablet 81 mg  81 mg Oral Daily Bonnie Lindsey PA-C   81 mg at 05/27/21 2200    atorvastatin (LIPITOR) tablet 80 mg  80 mg Oral QPM Syd Paul DO   80 mg at 05/26/21 1728    enoxaparin (LOVENOX) subcutaneous injection 40 mg  40 mg Subcutaneous Daily Moris Ochoa PA-C   40 mg at 05/27/21 8137    insulin glargine (LANTUS) subcutaneous injection 30 Units 0 3 mL  30 Units Subcutaneous HS Norbert Ricks MD        insulin lispro (HumaLOG) 100 units/mL subcutaneous injection 1-5 Units  1-5 Units Subcutaneous TID AC Bonnie Lindsey PA-C   4 Units at 05/27/21 1243    insulin lispro (HumaLOG) 100 units/mL subcutaneous injection 1-5 Units  1-5 Units Subcutaneous HS Bonnie Lindsey PA-C   2 Units at 05/26/21 2122    insulin lispro (HumaLOG) 100 units/mL subcutaneous injection 10 Units  10 Units Subcutaneous TID With Meals Norbert Ricks MD   10 Units at 05/27/21 1244    losartan (COZAAR) tablet 50 mg  50 mg Oral Daily [facility], DO   50 mg at 05/27/21 0907    metoprolol succinate (TOPROL-XL) 24 hr tablet 50 mg  50 mg Oral Daily [facility], DO   50 mg at 05/27/21 0907    potassium chloride (K-DUR,KLOR-CON) CR tablet 20 mEq  20 mEq Oral Once Norbert Ricks MD         No Known Allergies    Objective   Vitals: Blood pressure 122/68, pulse 78, temperature 97 7 °F (36 5 °C), temperature source Oral, resp  rate 16, height 5' 5" (1 651 m), weight 69 kg (152 lb 3 2 oz), SpO2 94 %  Intake/Output Summary (Last 24 hours) at 5/27/2021 1548  Last data filed at 5/27/2021 1300  Gross per 24 hour   Intake 720 ml   Output 0 ml   Net 720 ml     Invasive Devices     Peripheral Intravenous Line            Peripheral IV 05/25/21 Right Antecubital 1 day                Physical Exam  Constitutional:       Appearance: She is well-developed  HENT:      Head: Normocephalic and atraumatic  Eyes:      Conjunctiva/sclera: Conjunctivae normal       Pupils: Pupils are equal, round, and reactive to light  Neck:      Musculoskeletal: Normal range of motion and neck supple  Thyroid: No thyromegaly  Cardiovascular:      Rate and Rhythm: Normal rate and regular rhythm  Heart sounds: Normal heart sounds  No murmur  Pulmonary:      Effort: Pulmonary effort is normal       Breath sounds: Normal breath sounds  No wheezing  Abdominal:      General: There is no distension  Palpations: Abdomen is soft  Tenderness: There is no abdominal tenderness  Musculoskeletal: Normal range of motion  Skin:     General: Skin is warm and dry  Neurological:      Mental Status: She is alert and oriented to person, place, and time  Psychiatric:         Behavior: Behavior normal          The history was obtained from the review of the chart, patient      Lab Results:   Results from last 7 days   Lab Units 05/26/21  0549   HEMOGLOBIN A1C % >14 0*     Lab Results   Component Value Date    WBC 8 12 05/26/2021    HGB 14 0 05/26/2021    HCT 42 7 05/26/2021    MCV 90 05/26/2021     05/26/2021     Lab Results   Component Value Date/Time    BUN 22 05/27/2021 05:42 AM    K 3 8 05/27/2021 05:42 AM     05/27/2021 05:42 AM    CO2 24 05/27/2021 05:42 AM    CREATININE 1 06 05/27/2021 05:42 AM    AST 27 05/25/2021 06:18 PM    ALT 35 05/25/2021 06:18 PM    ALB 3 9 05/25/2021 06:18 PM     Recent Labs     05/26/21  0549   HDL 33*   TRIG 249*     No results found for: MICROALBUR, [id number]  POC Glucose (mg/dl)   Date Value   05/27/2021 334 (H)   05/27/2021 256 (H)   05/26/2021 257 (H)   05/26/2021 345 (H)   05/26/2021 351 (H)   05/26/2021 382 (H)   05/25/2021 311 (H)       Imaging Studies: I have personally reviewed pertinent reports  Portions of the record may have been created with voice recognition software  Occasional wrong word or "sound a like" substitutions may have occurred due to the inherent limitations of voice recognition software  Read the chart carefully and recognize, using context, where substitutions have occurred

## 2021-05-27 NOTE — PROGRESS NOTES
[facility]  Progress Note Azalee Leventhal Mclaughlinweryne 1960, 64 y o  female MRN: [id number]  Unit/Bed#: S -01 Encounter: [id number]  Primary Care Provider: Nicolette Cheney MD   Date and time admitted to hospital: 5/25/2021  4:44 PM    Nonsust[facility]ox[facility] ([facility]  Assessment & Plan  - Pt has had multiple episodes of asymptomatic NSPVT on telemetry  - Patient's electrolytes are normal, will continue to monitor and replete as needed  Goal K > 4; Phos > 3; Mg >2  - Continue telemetry monitoring  - Continue Metoprolol XL  - Cardiology consulted, patient to follow up with prior cardiologist at [facility] on d/c    CHF (congestive heart failure) ([facility] )  Assessment & Plan  - Pt reports history of an episode of "acute heart failure years ago" that subsequently resolved  - Per records from [facility] 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% that subsequently normalized to 50% following medical therapy  - Prior Studies:   - 8/21/15: Stress ECHO: The hemodynamic response to exercise was normal  No cp with exercise  No ekg changes diagnostic for ischemia  No evidence for ischemia on perfusion imaging  Normal LV systolic function  - 1/17/2016 Cardiac Cath: LVEF 25-30 % and no significant evidence of CAD     - 1/17/2016 Echocardiogram: LVEF 35%-40%  Trace MR and trace TR  Normal IVC   - 02/04/2016 Echocardiogram: LVEF 50% - no valvular disease  - Echo 1/43/8130: Systolic function mildly reduced  EF 45 % to 50 %, mildly increased LV wall, grade 1 diastolic dysfunction  No evidence of apical thrombus  - Pt is on amlodipine, metoprolol, and losartan, continue  - Patient is not reporting any symptoms of CHF and appears euvolemic  - Pt advised to follow HF diet, 2 gram Na diet, fluid restriction and daily weights  - Pt to follow up with her previous cardiologist at [facility] on D/C    HLD (hyperlipidemia)  Assessment & Plan  - Lipid Profile 05/26/2021:  Total Cholesterol 283 / Triglycerides 249 / HDL 33 /   - PTA on losartan 50 mg daily, will discontinue and start the patient on atorvastatin 80 mg daily    Prolonged QT interval  Assessment & Plan  · QTc prolonged at 490 on EKG  Prior EKG from 1/2013 with QTc of 472  · Patient is on Lexapro as an outpatient which will be held   · Avoid QT prolonging medications    Recent Labs     05/25/21  1659 05/26/21  0018 05/26/21  0019   QTCINT [phone]         Hyponatremia in the setting of hyperglycemia  Assessment & Plan  · Na 133 on presentation, corrected Na for hyperglycemia is 140  · Resolved    Results from last 7 days   Lab Units 05/27/21  0542 05/26/21  0549 05/25/21  2324   SODIUM mmol/L 143 141 135*         Hypertensive urgency  Assessment & Plan  · SBP 190s on presentation  · Patient reports that she has not taken her medications the past 1 week as she ran out of them  · Received her home medications in the ED, amlodipine 5 mg, losartan 50 mg and metoprolol succinate 50 mg and BP has improved  · Will restart all of her PTA antihypertensives now that stroke has been determined to be unlikely    · Continue to monitor BP closely  Word finding difficulty  Assessment & Plan  · Presented after an episode of word finding difficulty, blurry vision and dizziness  Symptoms reportedly lasted about an hour and a half prior to resolving  Most likely the her symptoms were due to hypertensive urgency vs symptomatic hyperglycemia  Unlikely TIA but cannot fully rule it out  · CTA head and neck was a normal study  · Stroke pathway  · Monitor neuro checks per protocol and notify of any changes  · Echo 4/80: Systolic function mildly reduced  EF 45 % to 50 %, mildly increased LV wall, grade 1 diastolic dysfunction  No evidence of apical thrombus  · Neurology consulted    Per Neurology they will defer MRI brain due to nonfocal exam and symptoms likely being secondary to HTN and metabolic disturbances  · T8J 2021: >14  · Lipid Profile 2021: Total Cholesterol 283 / Triglycerides 249 / HDL 33 /   · BP elevated on presentation, will restart BP medications    * Uncontrolled type 2 diabetes mellitus with hyperglycemia [facility])  Assessment & Plan  Lab Results   Component Value Date    HGBA1C >14 0 (H) 2021       Recent Labs     21  0737 21  1203 21  1555 21  2106   POCGLU 382* 351* 345* 257*       Blood Sugar Average: Last 72 hrs:  (P) 329 2    · Blood glucose 534 on BMP on presentation  · Patient reports that she ran out of her medications about 1 week ago  Her home medication regimen includes Janumet and Amaryl  She admits to polydipsia and polyuria over the past few days  · AG noted to be elevated to 15 on presentation but urine negative for ketones  · Received 10 units of insulin in the ED as well as a 1L bolus of NS    · A1c 2021: >14  · PTA on: Glimepiride 4mg BID, sitagliptin-metformin 50-1000mg QD    Plan:   Hold oral antihyperglycemics   Insulin regimen  o Short acting: Humalog 10 units before meals  o Long actin units of Lantus q h s   o Sliding Scale Insulin   Goal Blood Glucose 140-180   Glucose checks   Monitor for hypoglycemia and treat per protocol   Diabetic Diet: Consistent CHO Level 2 (5 CHO servings/75g CHO per meal)   Endocrinology consulted, appreciate recommendations    VTE Pharmacologic Prophylaxis:   Pharmacologic: Enoxaparin (Lovenox)  Mechanical VTE Prophylaxis in Place: Yes    Discussions with Specialists or Other Care Team Provider: Cardiology, Nephrology    Education and Discussions with Family / Patient: Discussed plan of care with patient, patient to update family herself, denied our offer to update them    Current Length of Stay: 2 day(s)    Current Patient Status: Inpatient     Discharge Plan / Estimated Discharge Date:   To be determined, most likely within 24 hours but possibly 48 hours    Code Status: Level 1 - Full Code    Subjective:   Patient seen and examined  The patient again had an episode of asymptomatic NSVT  The patient again had hyperglycemia, will adjust insulin regime, endocrinology consult pending  I discussed with the patient that her A1c was significantly elevated and she will be discharged on insulin, IA informed her that the nursing staff will start teaching her how to inject herself with insulin while she is here so that she would be able to do it when she is discharged  I also informed the patient that she would need to have an eye examination after discharge due to her diabetes, the patient agreed to have lung completed  Patient denies any headache, dizziness, nausea, vomiting, constipation, diarrhea, chest pain, palpitations, shortness of breath, wheezing, or any recurrence of her word-finding episodes  A 12 point review of systems was completed and was negative unless noted above  Objective:     Vitals:   Temp (24hrs), Av 2 °F (36 8 °C), Min:98 °F (36 7 °C), Max:98 3 °F (36 8 °C)    Temp:  [98 °F (36 7 °C)-98 3 °F (36 8 °C)] 98 °F (36 7 °C)  HR:  [66-79] 74  Resp:  [16-18] 16  BP: (115-131)/(65-76) 127/76  SpO2:  [93 %-95 %] 94 %  Body mass index is 25 33 kg/m²  Input and Output Summary (last 24 hours): Intake/Output Summary (Last 24 hours) at 2021 1245  Last data filed at 2021 0900  Gross per 24 hour   Intake 480 ml   Output 0 ml   Net 480 ml       Physical Exam:     Physical Exam  Vitals signs and nursing note reviewed  Constitutional:       General: She is not in acute distress  Appearance: She is well-developed  She is not diaphoretic  HENT:      Head: Normocephalic and atraumatic  Nose: Nose normal    Eyes:      General:         Right eye: No discharge  Left eye: No discharge  Conjunctiva/sclera: Conjunctivae normal    Neck:      Musculoskeletal: Normal range of motion  Cardiovascular:      Rate and Rhythm: Normal rate and regular rhythm  Heart sounds: No murmur  No friction rub  No gallop  Pulmonary:      Effort: Pulmonary effort is normal  No respiratory distress  Breath sounds: Normal breath sounds  Abdominal:      General: Abdomen is flat  Palpations: Abdomen is soft  Musculoskeletal: Normal range of motion  Right lower leg: No edema  Left lower leg: No edema  Skin:     General: Skin is warm and dry  Neurological:      General: No focal deficit present  Mental Status: She is alert and oriented to person, place, and time  Psychiatric:         Behavior: Behavior normal          Thought Content: Thought content normal          Judgment: Judgment normal         Additional Data:     Labs: I have personally reviewed pertinent reports  Results from last 7 days   Lab Units 05/26/21  0549 05/25/21 2324 05/25/21  1818   WBC Thousand/uL 8 12  --  7 83   HEMOGLOBIN g/dL 14 0  --  16 1*   HEMATOCRIT % 42 7  --  47 3*   PLATELETS Thousands/uL 201 238 197   NEUTROS PCT %  --   --  66   MONOS PCT %  --   --  5      Results from last 7 days   Lab Units 05/27/21  0542 05/26/21  0549 05/25/21 2324 05/25/21  1818   POTASSIUM mmol/L 3 8 4 5 3 5 4 1   CHLORIDE mmol/L 106 108 102 94*   CO2 mmol/L 24 24 24 24   BUN mg/dL 22 20 16 20   CREATININE mg/dL 1 06 1 04 1 00 1 12   CALCIUM mg/dL 8 6 8 5 8 7 9 2   ALK PHOS U/L  --   --   --  145*   ALT U/L  --   --   --  35   AST U/L  --   --   --  27     Results from last 7 days   Lab Units 05/27/21  0542 05/26/21  0549   MAGNESIUM mg/dL 2 0 2 0              Results from last 7 days   Lab Units 05/25/21  2324   LACTIC ACID mmol/L 1 9     Results from last 7 days   Lab Units 05/25/21  1818   TROPONIN I ng/mL <0 02       * Additional Pertinent Lab Tests Reviewed: [place] [facility] Admission Reviewed    Radiology Results: I have personally reviewed pertinent reports  Cta Head And Neck With And Without Contrast    Result Date: 5/25/2021  Impression: Normal CT of the brain  Normal CTA of the neck and brain  Workstation performed: [id number]       Recent Cultures (last 7 days):           Last 24 Hours Medication List:   Current Facility-Administered Medications   Medication Dose Route Frequency Provider Last Rate    acetaminophen  650 mg Oral Q4H PRN Rue Seat, RIP      amLODIPine  5 mg Oral Daily Teganlia Emelyn, DO      aspirin  81 mg Oral Daily Rue Seat, PA-MANDO      atorvastatin  80 mg Oral QPM Syd Paul DO      enoxaparin  40 mg Subcutaneous Daily Rue Seat, RIP      insulin glargine  30 Units Subcutaneous HS Norbert Ricks MD      insulin lispro  1-5 Units Subcutaneous TID AC Bonnie Lindsey PA-C      insulin lispro  1-5 Units Subcutaneous HS Bonnie Lindsey PA-C      insulin lispro  10 Units Subcutaneous TID With Meals Norbert Ricks MD      losartan  50 mg Oral Daily Artelia Emelyn, DO      metoprolol succinate  50 mg Oral Daily Artelia Emelyn, DO          Today, Patient Was Seen By: Shanelle Dunaway DO    Portions of the record may have been created with voice recognition software  Occasional wrong word or "sound a like" substitutions may have occurred due to the inherent limitations of voice recognition software  Read the chart carefully and recognize, using context, where substitutions have occurred

## 2021-05-27 NOTE — DISCHARGE INSTR - AVS FIRST PAGE
Dear Martin Short,     It was our pleasure to care for you here at [place], [facility]  It is our hope that we were always able to exceed the expected standards for your care during your stay  You were hospitalized due to difficulty with speech  You were cared for on the 3rd floor by [employer], DO under the service of Alonso Rodriguez MD with the [facility] who covers for your primary care physician (PCP), Ayad Loaiza MD, while you were hospitalized  If you have any questions or concerns related to this hospitalization, you may contact us at [phone]  For follow up as well as any medication refills, we recommend that you follow up with your primary care physician  A registered nurse will reach out to you by phone within a few days after your discharge to answer any additional questions that you may have after going home  However, at this time we provide for you here, the most important instructions / recommendations at discharge:     · Notable Medication Adjustments -   · Start Humalog (short acting insulin) - 12 units before meals 3 times a day  · Start Lantus (long-acting insulin) - 35 units at bedtime  · Start Lipitor (atorvastatin) - 80 mg daily  · Start metformin 1000 mg twice a day with meals  · Stop glimepiride  · STOP taking lexapro unless you are cleared to take it by your cardiologist and/or primary care doctor due to your abnormal EKG findings  · Stop lisinopril  · Continue losartan and metoprolol  · Testing Required after Discharge -   · None  · Important follow up information -   · Please follow up with your previous Cardiologist, Dr Yolanda Cai at [facility]  · Please follow-up with a ophthalmologist or optometrist to have a eye examination completed  · Outpatient follow-up with primary care physician and endocrinologist/diabetes doctor    · Other Instructions -   · Please keep a journal of your blood sugar readings and bring this record on follow-up with your doctors  · Please call your doctor if your blood glucose level reads greater than 250 or less than 70  · Diabetic, low-fat diet  Eat nutritious food  · Please review this entire after visit summary as additional general instructions including medication list, appointments, activity, diet, any pertinent wound care, and other additional recommendations from your care team that may be provided for you        Sincerely,     Yehuda Carroll, DO

## 2021-05-28 VITALS
WEIGHT: 153.2 LBS | OXYGEN SATURATION: 95 % | HEIGHT: 65 IN | SYSTOLIC BLOOD PRESSURE: 127 MMHG | BODY MASS INDEX: 25.52 KG/M2 | HEART RATE: 76 BPM | RESPIRATION RATE: 18 BRPM | DIASTOLIC BLOOD PRESSURE: 78 MMHG | TEMPERATURE: 98.2 F

## 2021-05-28 LAB
ANION GAP SERPL CALCULATED.3IONS-SCNC: 10 MMOL/L (ref 4–13)
BUN SERPL-MCNC: 21 MG/DL (ref 5–25)
CALCIUM SERPL-MCNC: 8.5 MG/DL (ref 8.3–10.1)
CHLORIDE SERPL-SCNC: 109 MMOL/L (ref 100–108)
CO2 SERPL-SCNC: 24 MMOL/L (ref 21–32)
CREAT SERPL-MCNC: 0.83 MG/DL (ref 0.6–1.3)
GFR SERPL CREATININE-BSD FRML MDRD: 76 ML/MIN/1.73SQ M
GLUCOSE SERPL-MCNC: 124 MG/DL (ref 65–140)
GLUCOSE SERPL-MCNC: 135 MG/DL (ref 65–140)
GLUCOSE SERPL-MCNC: 227 MG/DL (ref 65–140)
MAGNESIUM SERPL-MCNC: 1.8 MG/DL (ref 1.6–2.6)
POTASSIUM SERPL-SCNC: 3.7 MMOL/L (ref 3.5–5.3)
SODIUM SERPL-SCNC: 143 MMOL/L (ref 136–145)

## 2021-05-28 PROCEDURE — 80048 BASIC METABOLIC PNL TOTAL CA: CPT | Performed by: INTERNAL MEDICINE

## 2021-05-28 PROCEDURE — 83735 ASSAY OF MAGNESIUM: CPT | Performed by: INTERNAL MEDICINE

## 2021-05-28 PROCEDURE — 99232 SBSQ HOSP IP/OBS MODERATE 35: CPT | Performed by: INTERNAL MEDICINE

## 2021-05-28 PROCEDURE — 99239 HOSP IP/OBS DSCHRG MGMT >30: CPT | Performed by: INTERNAL MEDICINE

## 2021-05-28 PROCEDURE — 82948 REAGENT STRIP/BLOOD GLUCOSE: CPT

## 2021-05-28 RX ORDER — ATORVASTATIN CALCIUM 80 MG/1
80 TABLET, FILM COATED ORAL EVERY EVENING
Qty: 30 TABLET | Refills: 0 | Status: SHIPPED | OUTPATIENT
Start: 2021-05-28

## 2021-05-28 RX ORDER — LOSARTAN POTASSIUM 50 MG/1
50 TABLET ORAL DAILY
Qty: 30 TABLET | Refills: 0 | Status: SHIPPED | OUTPATIENT
Start: 2021-05-28

## 2021-05-28 RX ORDER — METOPROLOL SUCCINATE 50 MG/1
50 TABLET, EXTENDED RELEASE ORAL DAILY
Qty: 30 TABLET | Refills: 0 | Status: SHIPPED | OUTPATIENT
Start: 2021-05-28

## 2021-05-28 RX ORDER — BLOOD-GLUCOSE METER
KIT MISCELLANEOUS
Qty: 1 EACH | Refills: 0 | Status: SHIPPED | OUTPATIENT
Start: 2021-05-28

## 2021-05-28 RX ORDER — LANCETS 28 GAUGE
EACH MISCELLANEOUS
Qty: 100 EACH | Refills: 0 | Status: SHIPPED | OUTPATIENT
Start: 2021-05-28

## 2021-05-28 RX ORDER — POTASSIUM CHLORIDE 20 MEQ/1
20 TABLET, EXTENDED RELEASE ORAL ONCE
Status: COMPLETED | OUTPATIENT
Start: 2021-05-28 | End: 2021-05-28

## 2021-05-28 RX ORDER — PEN NEEDLE, DIABETIC 32GX 5/32"
NEEDLE, DISPOSABLE MISCELLANEOUS
Qty: 100 EACH | Refills: 0 | Status: SHIPPED | OUTPATIENT
Start: 2021-05-28

## 2021-05-28 RX ORDER — INSULIN LISPRO 100 [IU]/ML
12 INJECTION, SOLUTION INTRAVENOUS; SUBCUTANEOUS
Qty: 15 ML | Refills: 0 | Status: SHIPPED | OUTPATIENT
Start: 2021-05-28 | End: 2021-06-16

## 2021-05-28 RX ORDER — AMLODIPINE BESYLATE 5 MG/1
5 TABLET ORAL DAILY
Qty: 30 TABLET | Refills: 0 | Status: SHIPPED | OUTPATIENT
Start: 2021-05-28

## 2021-05-28 RX ORDER — GLUCOSAMINE HCL/CHONDROITIN SU 500-400 MG
CAPSULE ORAL
Qty: 100 EACH | Refills: 0 | Status: SHIPPED | OUTPATIENT
Start: 2021-05-28

## 2021-05-28 RX ORDER — BLOOD SUGAR DIAGNOSTIC
STRIP MISCELLANEOUS
Qty: 100 EACH | Refills: 0 | Status: SHIPPED | OUTPATIENT
Start: 2021-05-28

## 2021-05-28 RX ORDER — INSULIN GLARGINE 100 [IU]/ML
35 INJECTION, SOLUTION SUBCUTANEOUS
Qty: 15 ML | Refills: 0 | Status: SHIPPED | OUTPATIENT
Start: 2021-05-28 | End: 2021-06-16

## 2021-05-28 RX ADMIN — ASPIRIN 81 MG: 81 TABLET, CHEWABLE ORAL at 09:06

## 2021-05-28 RX ADMIN — MAGNESIUM OXIDE TAB 400 MG (241.3 MG ELEMENTAL MG) 400 MG: 400 (241.3 MG) TAB at 09:06

## 2021-05-28 RX ADMIN — LOSARTAN POTASSIUM 50 MG: 50 TABLET, FILM COATED ORAL at 09:06

## 2021-05-28 RX ADMIN — POTASSIUM CHLORIDE 20 MEQ: 1500 TABLET, EXTENDED RELEASE ORAL at 09:06

## 2021-05-28 RX ADMIN — INSULIN LISPRO 2 UNITS: 100 INJECTION, SOLUTION INTRAVENOUS; SUBCUTANEOUS at 13:23

## 2021-05-28 RX ADMIN — ENOXAPARIN SODIUM 40 MG: 40 INJECTION SUBCUTANEOUS at 09:06

## 2021-05-28 RX ADMIN — AMLODIPINE BESYLATE 5 MG: 5 TABLET ORAL at 09:06

## 2021-05-28 RX ADMIN — METOPROLOL SUCCINATE 50 MG: 50 TABLET, EXTENDED RELEASE ORAL at 09:06

## 2021-05-28 RX ADMIN — ACETAMINOPHEN 650 MG: 325 TABLET, FILM COATED ORAL at 05:02

## 2021-05-28 NOTE — ASSESSMENT & PLAN NOTE
- Pt had multiple episodes of asymptomatic NSPVT on telemetry, appear to have subsided following restarting of metoprolol  - Continue Metoprolol XL  - Cardiology consulted, patient to follow up with prior cardiologist at [facility] on d/c

## 2021-05-28 NOTE — ASSESSMENT & PLAN NOTE
· QTc prolonged at 490 on EKG  Prior EKG from 1/2013 with QTc of 472    · Patient is on Lexapro as an outpatient which will be held and pt will be advised to follow up as an outpatient with her cardiologist and or internist regarding restarting it  · Avoid QT prolonging medications    Recent Labs     05/25/21  1659 05/26/21  0018 05/26/21  0019   QTCINT [phone]

## 2021-05-28 NOTE — DISCHARGE INSTRUCTIONS
How to Check your Blood Sugar   AMBULATORY CARE:   How to check your blood sugar: You will be taught how to check a small drop of blood with a glucose meter  Get all of your supplies together, such as your meter, test strips, and lancet device  Be sure to read the information that came with your meter  Use the following steps as a guide:  · Wash your hands  Use warm water to help blood flow  Dry your hands completely  · Put the test strip in the meter  This will turn on your meter  · Use the lancing device to stick your finger or area  Do not use the same finger or area every time  Stick the side of your fingertip, not the middle  The side may cause less pain  · Squeeze your fingertip or area gently  · Touch the drop of blood to the test strip  A number will appear on the meter after a few seconds  This is your blood sugar level  ·          Have someone call 911 for any of the following:   · You cannot be woken  · You have chest pain or shortness of breath  Seek care immediately if:   · You have a low blood sugar level and it does not improve with treatment  · Your blood sugar level is above 240 mg/dL and does not come down after you take a dose of insulin  · You have ketones in your blood or urine  · You have blurred or double vision  · Your breath has a fruity, sweet smell, or your breathing is shallow  · You have symptoms of a low blood sugar level, such as trouble thinking, sweating, or a pounding heartbeat  Contact your healthcare provider if:   · Your blood sugar levels are higher than your target goals  · You often have low blood sugar levels  · You have trouble coping with your illness or you feel anxious or depressed  · You have questions or concerns about your condition or care  When to check your blood sugar level:   · If you check your blood sugar level before a meal , it will show your lowest blood sugar   If you check your blood sugar level 2 hours after a meal , it will show your highest blood sugar  Ask your healthcare provider what good goals are for your blood sugar levels at different times  · You may need to check for ketones in your urine or blood if your blood sugar level is higher than directed  Follow up with your healthcare provider as directed: Write down your results and show them to your healthcare provider at every visit  Also, write down your questions so you remember to ask them during your visits  © Copyright [address] Information is for End User's use only and may not be sold, redistributed or otherwise used for commercial purposes  All illustrations and images included in CareNotes® are the copyrighted property of A D A M , Inc  or [address] [place] [address]  The above information is an  only  It is not intended as medical advice for individual conditions or treatments  Talk to your doctor, nurse or pharmacist before following any medical regimen to see if it is safe and effective for you  How to Draw Up Insulin   AMBULATORY CARE:   Insulin should be drawn up correctly and safely  This will help prevent problems such as infection or low or high blood sugar levels  Use the correct size insulin syringe to make sure you get the right dose of insulin  For example, you must inject U100 insulin with U100 syringes  A different syringe is needed for U500 insulin  Your healthcare provider or pharmacist will help you find the right size syringe  The syringe will have measurements in mL and units  Contact your healthcare provider if:   · You have questions about how to draw up insulin  · You cannot afford to buy your diabetes supplies  · You have questions or concerns about your condition or care  How to draw up 1 type of insulin into a syringe: If you use only 1 type of insulin at a time, do the following:  · Remove insulin from the refrigerator 30 minutes before you will use it   Inject insulin that is room temperature  · Wash your hands  This will help decrease your risk for an infection  · Gather your insulin supplies  Get your insulin bottle, syringe, and alcohol pads  Check the insulin bottle to make sure it is the right type and strength of insulin  Also check the expiration date  Do not use  insulin  Rapid and short-acting insulin should be clear with no particles  Do not use the insulin if there are clumps or particles in it  · Gently mix intermediate or long-acting insulin  These must be mixed before they are given  Turn the bottle on its side and roll it between the palms of your hands  Do not shake the bottle  This can make the insulin clump together  You do not need to mix rapid or short-acting insulin  · Prepare the insulin bottle  Clean the top of the insulin bottle with an alcohol pad or cotton swab dipped in alcohol  · Pull air into the syringe  Remove the cap from the needle  Pull back on the plunger to draw in an amount of air that is equal to your insulin dose  Place the bottle on a hard surface  Push the needle into the bottle top and inject the air into the bottle  Leave the needle in the bottle  This helps keep the correct amount of pressure in the bottle  It also makes it easier to draw up the insulin  · Draw up the insulin into the syringe  Turn the bottle and syringe upside down  Pull back on the plunger  Fill the syringe with a little more than the insulin dose you need  · Check the syringe for air bubbles  If you see bubbles, hold the bottle and syringe with 1 hand  Tap the syringe with 1 finger on your other hand  This will make the air bubbles rise to the top of the syringe  Slowly push on the plunger just enough to move out air and extra insulin  Check the syringe for the correct amount of insulin  · Remove the needle from the bottle  Do not let the end of the needle touch anything  Inject the insulin as directed   If you need to recap the needle, place the cap on a table or hard surface  Slowly slide the needle into the cap  How to draw up 2 types of insulin into a syringe: If you use 2 types of insulin at one time, do the following:  · Remove insulin from the refrigerator 30 minutes before you will use it  Inject insulin that is room temperature  · Wash your hands  This will help decrease your risk for an infection  · Gather your insulin supplies  Get your insulin bottle, syringe, and alcohol pads  Check the insulin bottle to make sure it is the right type and strength of insulin  Also check the expiration date  Do not use  insulin  Rapid and short-acting insulin should be clear with no particles  Do not use the insulin if there are clumps or particles in it  · Determine the total amount of insulin you need  Add the number of units of each type of insulin together  For example you may need 6 units of intermediate-acting insulin and 5 units of short-acting insulin  That is a total of 11 units of insulin  · Gently mix intermediate or long-acting insulin  These must be mixed before they are given  Turn the bottle on its side and roll it between the palms of your hands  Do not shake the bottle  This can make the insulin clump together  You do not need to mix the rapid or short-acting insulin  · Prepare the insulin bottles  Clean the top of both insulin bottles with an alcohol pad  · Prepare the syringe  Remove the cap from the needle  · Inject air into the intermediate or long-acting insulin bottle  This insulin should be cloudy  Pull back the plunger on the syringe to draw in an amount of air that is equal to your long-acting insulin dose  Place the bottle on a hard surface  Push the needle through the top of the long-acting insulin bottle and inject air into the bottle  Do not draw the insulin into the syringe  Remove the empty syringe and needle from the bottle        · Inject air into the short-acting insulin bottle  This insulin should be clear  Pull the plunger back to draw in enough air to equal your short-acting insulin dose  Push the needle in through the top of the short-acting insulin bottle  Inject air into the short-acting insulin bottle  Leave the needle in the bottle  · Draw up the short-acting insulin first  Turn the bottle and syringe upside down  Pull the plunger to fill the syringe with just a little more than the insulin dose you need  · Check the syringe for air bubbles  If you see any bubbles, hold the bottle and syringe with 1 hand  Tap the syringe with 1 finger on your other hand  This will make the air bubbles rise to the top of the syringe  Slowly push on the plunger to move out air and extra insulin  · Remove the needle from the bottle  Recheck your dose  · Draw up the intermediate or long-acting insulin  Insert the needle into the bottle of long-acting insulin  Turn the bottle and syringe upside down  Slowly pull on the plunger to draw insulin into the syringe  Because the short-acting insulin dose is already in the syringe, pull the plunger to the total number of units you need  · Check for bubbles  Hold the bottle and syringe with 1 hand  Gently tap the syringe with 1 finger on your other hand  This will make them rise to the top  Do not push air into the bottle  This may cause you to push the mixed insulin into the bottle  · Remove the needle from the bottle  Do not let the end of the needle touch anything  Inject the insulin as directed  If you need to recap the needle, place the cap on a table or hard surface  Slowly slide the needle into the cap  Follow up with your healthcare provider as directed: Write down your questions so you remember to ask them during your visits  © Copyright [facility] [address] Information is for End User's use only and may not be sold, redistributed or otherwise used for commercial purposes   All illustrations and images included in CareNotes® are the copyrighted property of A [employer] A M [employer]  or [employer]   The above information is an  only  It is not intended as medical advice for individual conditions or treatments  Talk to your doctor, nurse or pharmacist before following any medical regimen to see if it is safe and effective for you  How to Give an Insulin Injection   WHAT YOU NEED TO KNOW:   Insulin syringes come in different sizes depending on the dose of insulin you need  Use the correct size syringe to make sure you get the right dose of insulin  Your healthcare provider or pharmacist will help you find the right size syringe for you  DISCHARGE INSTRUCTIONS:   Contact your healthcare provider if:   · You feel or see hard lumps in your skin where you inject your insulin  · You think you gave yourself too much or not enough insulin  · Your injections are very painful  · You see blood or clear fluid on your injection site more than once after you inject insulin  · You have questions about how to give the injection  · You cannot afford to buy your diabetes supplies  · You have questions or concerns about your condition or care  Where to inject insulin:   · You can inject insulin into your abdomen, upper arm, buttocks, hip, and the front or side of the thigh  Insulin works fastest when it is injected into the abdomen  · Do not inject insulin into areas where you have a wound or bruising  Insulin injected into wounds or bruises may not get into your body correctly  · Use a different area within the site each time you inject insulin  For example, inject insulin into different areas in your abdomen  Insulin injected into the same area can cause lumps, swelling, or thickened skin  How to inject insulin with a syringe:   · Clean the skin where you will inject the insulin  You can use an alcohol pad or a cotton swab dipped in alcohol        · Melvin a fold of your skin  Gently pinch the skin and fat between your thumb and first finger  · Insert the needle straight into your skin  Do not hold the syringe at an angle  Make sure the needle is all the way into the skin  Let go of the pinched tissue  · Push down on the plunger to inject the insulin  Press on the plunger until the insulin is gone  Keep the needle in place for 5 seconds after you inject the insulin  · Pull out the needle  Press on your injection site for 5 to 10 seconds  Do not rub  This will keep insulin from leaking out  · Throw away your used insulin syringe as directed  Do not recap the syringe before you throw it away  Decrease pain when you inject insulin:   · Inject insulin at room temperature  If the insulin has been stored in the refrigerator, remove it 30 minutes before you inject it  · Remove all air bubbles from the syringe before the injection  · If you clean your skin with an alcohol pad, wait until it has dried before you inject insulin  · Relax the muscles at the injection site  · Do not change the direction of the needle during insertion or removal      Reuse your syringe only as directed: You may increase your risk for a bacterial infection when you reuse syringes  Ask your healthcare provider if it is safe for you to reuse a syringe  Do not reuse a syringe if you have an open wound, trouble seeing, or have an infection  The following are tips on how to safely reuse a syringe:  · Recap the needle as soon as you are done using it  Place the cap on a table or hard surface and slide the needle into the cap  · Do not let the needle touch anything but clean skin or the top of the insulin bottle  · Never share syringes with anyone  · Do not clean your needle with alcohol  This will remove the coating that helps your needle slide easily into your skin  · Throw out any syringe that bends or touches anything other than clean skin  Where to get rid of used syringes: Ask your healthcare provider where to get rid of your syringes  He may tell you to place the syringe in a heavy-duty laundry detergent bottle or a metal coffee can  The container should have a cap that fits securely  Ask your local waste authority if you need to follow certain rules for getting rid of your syringes  Bring your used syringes home with you when you travel  Pack them in a plastic or metal container with a secure lid  Follow up with your healthcare provider as directed: Write down your questions so you remember to ask them during your visits  © Copyright [address] Information is for End User's use only and may not be sold, redistributed or otherwise used for commercial purposes  All illustrations and images included in CareNotes® are the copyrighted property of [employer]  or [facility] [employer]   The above information is an  only  It is not intended as medical advice for individual conditions or treatments  Talk to your doctor, nurse or pharmacist before following any medical regimen to see if it is safe and effective for you  Cholesterol and Your Health   AMBULATORY CARE:   Cholesterol  is a waxy, fat-like substance  Your body uses cholesterol to make hormones and new cells, and to protect nerves  Cholesterol is made by your body  It also comes from certain foods you eat, such as meat and dairy products  Your healthcare provider can help you set goals for your cholesterol levels  He or she can help you create a plan to meet your goals  Cholesterol level goals: Your cholesterol level goals depend on your risk for heart disease, your age, and your other health conditions  The following are general guidelines:  · Total cholesterol  includes low-density lipoprotein (LDL), high-density lipoprotein (HDL), and triglyceride levels  The total cholesterol level should be lower than 200 mg/dL and is best at about 150 mg/dL      · LDL cholesterol  is called bad cholesterol  because it forms plaque in your arteries  As plaque builds up, your arteries become narrow, and less blood flows through  When plaque decreases blood flow to your heart, you may have chest pain  If plaque completely blocks an artery that brings blood to your heart, you may have a heart attack  Plaque can break off and form blood clots  Blood clots may block arteries in your brain and cause a stroke  The level should be less than 130 mg/dL and is best at about 100 mg/dL  · HDL cholesterol  is called good cholesterol  because it helps remove LDL cholesterol from your arteries  It does this by attaching to LDL cholesterol and carrying it to your liver  Your liver breaks down LDL cholesterol so your body can get rid of it  High levels of HDL cholesterol can help prevent a heart attack and stroke  Low levels of HDL cholesterol can increase your risk for heart disease, heart attack, and stroke  The level should be 60 mg/dL or higher  · Triglycerides  are a type of fat that store energy from foods you eat  High levels of triglycerides also cause plaque buildup  This can increase your risk for a heart attack or stroke  If your triglyceride level is high, your LDL cholesterol level may also be high  The level should be less than 150 mg/dL  What increases your risk for high cholesterol:   · Smoking cigarettes    · Being overweight or obese, or not getting enough exercise    · Drinking large amounts of alcohol    · A medical condition such as hypertension (high blood pressure) or diabetes    · Certain genes passed from your parents to you    · Age older than 65 years    What you need to know about having your cholesterol levels checked: Adults 21to 39years of age should have their cholesterol levels checked every 4 to 6 years  Adults 45 years or older should have their cholesterol checked every 1 to 2 years   You may need your cholesterol checked more often, or at a younger age, if you have risk factors for heart disease  You may also need to have your cholesterol checked more often if you have other health conditions, such as diabetes  Blood tests are used to check cholesterol levels  Blood tests measure your levels of triglycerides, LDL cholesterol, and HDL cholesterol  How healthy fats affect your cholesterol levels:  Healthy fats, also called unsaturated fats, help lower LDL cholesterol and triglyceride levels  Healthy fats include the following:  · Monounsaturated fats  are found in foods such as olive oil, canola oil, avocado, nuts, and olives  · Polyunsaturated fats,  such as omega 3 fats, are found in fish, such as salmon, trout, and tuna  They can also be found in plant foods such as flaxseed, walnuts, and soybeans  How unhealthy fats affect your cholesterol levels:  Unhealthy fats increase LDL cholesterol and triglyceride levels  They are found in foods high in cholesterol, saturated fat, and trans fat:  · Cholesterol  is found in eggs, dairy, and meat  · Saturated fat  is found in butter, cheese, ice cream, whole milk, and coconut oil  Saturated fat is also found in meat, such as sausage, hot dogs, and bologna  · Trans fat  is found in liquid oils and is used in fried and baked foods  Foods that contain trans fats include chips, crackers, muffins, sweet rolls, microwave popcorn, and cookies  Treatment  for high cholesterol will also decrease your risk of heart disease, heart attack, and stroke  Treatment may include any of the following:  · Lifestyle changes  may include food, exercise, weight loss, and quitting smoking  You may also need to decrease the amount of alcohol you drink  Your healthcare provider will want you to start with lifestyle changes  Other treatment may be added if lifestyle changes are not enough  · Medicines  may be given to lower your LDL cholesterol, triglyceride levels, or total cholesterol level   You may need medicines to lower your cholesterol if any of the following is true:     ? You have a history of stroke, TIA, unstable angina, or a heart attack  ? Your LDL cholesterol level is 190 mg/dL or higher  ? You are age 36 to 76 years, have diabetes or heart disease risk factors, and your LDL cholesterol is 70 mg/dL or higher  · Supplements  include fish oil, red yeast rice, and garlic  Fish oil may help lower your triglyceride and LDL cholesterol levels  It may also increase your HDL cholesterol level  Red yeast rice may help decrease your total cholesterol level and LDL cholesterol level  Garlic may help lower your total cholesterol level  Do not take these supplements without talking to your healthcare provider  Food changes you can make to lower your cholesterol levels:  A dietitian can help you create a healthy eating plan  He or she can show you how to read food labels and choose foods low in saturated fat, trans fats, and cholesterol  · Decrease the total amount of fat you eat  Choose lean meats, fat-free or 1% fat milk, and low-fat dairy products, such as yogurt and cheese  Try to limit or avoid red meats  Limit or do not eat fried foods or baked goods, such as cookies  · Replace unhealthy fats with healthy fats  Cook foods in olive oil or canola oil  Choose soft margarines that are low in saturated fat and trans fat  Seeds, nuts, and avocados are other examples of healthy fats  · Eat foods with omega-3 fats  Examples include salmon, tuna, mackerel, walnuts, and flaxseed  Eat fish 2 times per week  Pregnant women should not eat fish that have high levels of mercury, such as shark, swordfish, and carmencita mackerel  · Increase the amount of high-fiber foods you eat  High-fiber foods can help lower your LDL cholesterol  Aim to get between 20 and 30 grams of fiber each day  Fruits and vegetables are high in fiber  Eat at least 5 servings each day   Other high-fiber foods are whole-grain or whole-wheat breads, pastas, or cereals, and brown rice  Eat 3 ounces of whole-grain foods each day  Increase fiber slowly  You may have abdominal discomfort, bloating, and gas if you add fiber to your diet too quickly  · Eat healthy protein foods  Examples include low-fat dairy products, skinless chicken and turkey, fish, and nuts  · Limit foods and drinks that are high in sugar  Your dietitian or healthcare provider can help you create daily limits for high-sugar foods and drinks  The limit may be lower if you have diabetes or another health condition  Limits can also help you lose weight if needed  Lifestyle changes you can make to lower your cholesterol levels:   · Maintain a healthy weight  Ask your healthcare provider what a healthy weight is for you  Ask him or her to help you create a weight loss plan if needed  Weight loss can decrease your total cholesterol and triglyceride levels  Weight loss may also help keep your blood pressure at a healthy level  · Exercise regularly  Exercise can help lower your total cholesterol level and maintain a healthy weight  Exercise can also help increase your HDL cholesterol level  Work with your healthcare provider to create an exercise program that is right for you  Get at least 30 to 40 minutes of moderate exercise most days of the week  Examples of exercise include brisk walking, swimming, or biking  · Do not smoke  Nicotine and other chemicals in cigarettes and cigars can raise your cholesterol levels  Ask your healthcare provider for information if you currently smoke and need help to quit  E-cigarettes or smokeless tobacco still contain nicotine  Talk to your healthcare provider before you use these products  · Limit or do not drink alcohol  Alcohol can increase your triglyceride levels  Ask your healthcare provider before you drink alcohol  Ask how much is okay for you to drink in 1 day or 1 week      © Copyright [address] 2020 Information is for End User's use only and may not be sold, redistributed or otherwise used for commercial purposes  All illustrations and images included in CareNotes® are the copyrighted property of A D A M , Inc  or [employer]  The above information is an  only  It is not intended as medical advice for individual conditions or treatments  Talk to your doctor, nurse or pharmacist before following any medical regimen to see if it is safe and effective for you

## 2021-05-28 NOTE — PROGRESS NOTES
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   Echocardiogram 05/26/2021 showed EF 45 34%, grade 1 diastolic dysfunction, RV systolic function mildly reduced and no significant valvular disease  Patient appears euvolemic  Reviewed HF education with patient including 2 gram Na diet, fluid restriction and daily weights   She will follow up with Dr Parag Holguin from [place] [address] [facility]   Continue losartan and metoprolol XL     3  Hypertensive urgency  /117 POA   BP improved  BP average: 115/67  On amlodipine 5 mg PO daily, losartan 50 mg PO daily and metoprolol XL 50 mg PO daily      4  Uncontrolled type II diabetes   Hemoglobin A1C > 14  Followed by primary team on lantus and humalog      5  Hyperlipidemia    Continue high-intensity statin:  Atorvastatin 80 mg p o  Daily       Subjective  Patient reports she slept great  No complaint of palpitations, chest pain or SOB  No fever or chills  Review of Systems   Constitution: Negative for chills, fever and malaise/fatigue  HENT: Negative for congestion  Cardiovascular: Negative for chest pain, dyspnea on exertion, leg swelling, orthopnea and palpitations  Respiratory: Negative for cough and shortness of breath  Musculoskeletal: Negative for falls  Gastrointestinal: Negative for bloating, nausea and vomiting  Neurological: Negative for dizziness and light-headedness  Psychiatric/Behavioral: Negative for altered mental status         Objective:   Vitals: Blood pressure 127/78, pulse 76, temperature 98 2 °F (36 8 °C), temperature source Oral, resp  rate 18, height 5' 5" (1 651 m), weight 69 5 kg (153 lb 3 2 oz), SpO2 95 %  ,       Body mass index is 25 49 kg/m²  ,     Systolic (84RAX), GQM:769 , Min:97 , SAS:316     Diastolic (12RDJ), ZHF:20, Min:54, Max:78      Intake/Output Summary (Last 24 hours) at 5/28/2021 0940  Last data filed at 5/28/2021 0723  Gross per 24 hour   Intake 480 ml   Output 0 ml   Net 480 ml     Weight (last 2 days)     Date/Time   Weight    05/28/21 0544   69 5 (153 2)    05/27/21 0600   69 (152 2)    05/26/21 0553   68 2 (150 35)            Telemetry Review: Normal sinus rhythm with occasional NSVT     Physical Exam  Constitutional:       General: She is not in acute distress  Appearance: She is obese  HENT:      Head: Normocephalic  Mouth/Throat:      Mouth: Mucous membranes are moist    Neck:      Musculoskeletal: Neck supple  Cardiovascular:      Rate and Rhythm: Normal rate and regular rhythm  Pulses: Normal pulses  Heart sounds: No murmur  Pulmonary:      Effort: Pulmonary effort is normal  No respiratory distress  Breath sounds: Normal breath sounds  Abdominal:      General: Bowel sounds are normal       Palpations: Abdomen is soft  Musculoskeletal: Normal range of motion  General: No swelling  Skin:     General: Skin is warm and dry  Capillary Refill: Capillary refill takes less than 2 seconds  Neurological:      General: No focal deficit present  Mental Status: She is alert and oriented to person, place, and time     Psychiatric:         Mood and Affect: Mood normal          LABORATORY RESULTS  Results from last 7 days   Lab Units 05/25/21  1818   TROPONIN I ng/mL <0 02     CBC with diff:   Results from last 7 days   Lab Units 05/26/21  0549 05/25/21  2324 05/25/21  1818   WBC Thousand/uL 8 12  --  7 83   HEMOGLOBIN g/dL 14 0  --  16 1*   HEMATOCRIT % 42 7  --  47 3*   MCV fL 90  --  88   PLATELETS Thousands/uL 201 238 197   MCH pg 29 5  --  29 9 MCHC g/dL 32 8  --  34 0   RDW % 12 7  --  12 6   MPV fL 10 2 10 1 10 5   NRBC AUTO /100 WBCs  --   --  0       CMP:  Results from last 7 days   Lab Units 21  0542 21  0549 214 21  1818   POTASSIUM mmol/L 3 7 3 8 4 5 3 5 4 1   CHLORIDE mmol/L 109* 106 108 102 94*   CO2 mmol/L 24 24 24 24 24   BUN mg/dL 21 22 20 16 20   CREATININE mg/dL 0 83 1 06 1 04 1 00 1 12   CALCIUM mg/dL 8 5 8 6 8 5 8 7 9 2   AST U/L  --   --   --   --  27   ALT U/L  --   --   --   --  35   ALK PHOS U/L  --   --   --   --  145*   EGFR ml/min/1 73sq m 76 57 58 61 53       BMP:  Results from last 7 days   Lab Units 21  0542 21  0549 21  1818   POTASSIUM mmol/L 3 7 3 8 4 5 3 5 4 1   CHLORIDE mmol/L 109* 106 108 102 94*   CO2 mmol/L 24 24 24 24 24   BUN mg/dL 21 22 20 16 20   CREATININE mg/dL 0 83 1 06 1 04 1 00 1 12   CALCIUM mg/dL 8 5 8 6 8 5 8 7 9 2       No results found for: NTBNP          Results from last 7 days   Lab Units 21  0542 21  0549   MAGNESIUM mg/dL 1 8 2 0 2 0          Results from last 7 days   Lab Units 21  0549   HEMOGLOBIN A1C % >14 0*          Results from last 7 days   Lab Units 21  0549   TSH 3RD GENERATON uIU/mL 3 190             Lipid Profile:   No results found for: CHOL  Lab Results   Component Value Date    HDL 33 (L) 2021     Lab Results   Component Value Date    LDLCALC 200 (H) 2021     Lab Results   Component Value Date    TRIG 249 (H) 2021       Cardiac testing:   Results for orders placed during the hospital encounter of 21   Echo complete with contrast if indicated    Narrative [place] [address], [address]  [phone]    Transthoracic Echocardiogram  2D, M-mode, Doppler, and Color Doppler    Study date:  26-May-2021    Patient: Leyla Hoskins  MR number: [id number]  Account number: [de-identified]  : 1960  Age: 64 years  Gender: Female  Status: Inpatient  Location: Bedside  Height: 65 in  Weight: 149 6 lb  BP: 115/ 67 mmHg    Indications: TIA    Diagnoses: G45 9 - Transient cerebral ischemic attack, unspecified    Sonographer:  Montana Mixon RDCS  Primary Physician:  Benedicto Falk MD  Referring Physician:  Jemal Jones PA-C  Group:  [facility]  Interpreting Physician:  Smith Carcamo MD    IMPRESSIONS:  There was no echocardiographic evidence for a cardiac source of embolism  SUMMARY    LEFT VENTRICLE:  Systolic function was mildly reduced  Ejection fraction was estimated in the range of 45 % to 50 %  Wall thickness was mildly increased  Doppler parameters were consistent with abnormal left ventricular relaxation (grade 1 diastolic dysfunction)  No evidence of apical thrombus  RIGHT VENTRICLE:  Systolic function was mildly reduced  LEFT ATRIUM:  No thrombus was identified  ATRIAL SEPTUM:  No defect or patent foramen ovale was identified  MITRAL VALVE:  There was trace regurgitation  AORTIC VALVE:  There was no evidence for stenosis  HISTORY: PRIOR HISTORY: DM2, HTN, HF    PROCEDURE: The procedure was performed at the bedside  This was a routine study  The transthoracic approach was used  The study included complete 2D imaging, M-mode, complete spectral Doppler, and color Doppler  The heart rate was 67 bpm,  at the start of the study  Images were obtained from the parasternal, apical, subcostal, and suprasternal notch acoustic windows  Image quality was adequate  LEFT VENTRICLE: Size was normal  Systolic function was mildly reduced  Ejection fraction was estimated in the range of 45 % to 50 %  Wall thickness was mildly increased  No evidence of apical thrombus  DOPPLER: Doppler parameters were  consistent with abnormal left ventricular relaxation (grade 1 diastolic dysfunction)  RIGHT VENTRICLE: The size was normal  Systolic function was mildly reduced  Wall thickness was normal     LEFT ATRIUM: Size was normal  No thrombus was identified  ATRIAL SEPTUM: No defect or patent foramen ovale was identified  RIGHT ATRIUM: Size was normal     MITRAL VALVE: Valve structure was normal  There was normal leaflet separation  DOPPLER: The transmitral velocity was within the normal range  There was no evidence for stenosis  There was trace regurgitation  AORTIC VALVE: The valve was trileaflet  Leaflets exhibited normal thickness and normal cuspal separation  DOPPLER: Transaortic velocity was within the normal range  There was no evidence for stenosis  There was no significant  regurgitation  TRICUSPID VALVE: The valve structure was normal  There was normal leaflet separation  DOPPLER: The transtricuspid velocity was within the normal range  There was no evidence for stenosis  There was no significant regurgitation  PULMONIC VALVE: Not well visualized  DOPPLER: The transpulmonic velocity was within the normal range  There was no significant regurgitation  PERICARDIUM: There was no pericardial effusion  The pericardium was normal in appearance  AORTA: The root exhibited normal size  SYSTEMIC VEINS: IVC: The inferior vena cava was normal in size   Respirophasic changes were normal     SYSTEM MEASUREMENT TABLES    2D  %FS: 11 09 %  Ao Diam: 2 68 cm  EDV(Teich): 116 38 ml  EF Biplane: 50 01 %  EF(Teich): 24 03 %  ESV(Teich): 88 41 ml  IVSd: 1 1 cm  LA Diam: 3 74 cm  LAAs A4C: 16 69 cm2  LAESV A-L A4C: 55 67 ml  LAESV MOD A4C: 52 68 ml  LALs A4C: 4 25 cm  LVEDV MOD A2C: 87 82 ml  LVEDV MOD A4C: 86 17 ml  LVEDV MOD BP: 87 88 ml  LVEDVInd MOD BP: 50 21 ml/m2  LVEF MOD A2C: 51 89 %  LVEF MOD A4C: 46 69 %  LVESV MOD A2C: 42 25 ml  LVESV MOD A4C: 45 94 ml  LVESV MOD BP: 43 93 ml  LVESVInd MOD BP: 25 1 ml/m2  LVIDd: 4 97 cm  LVIDs: 4 42 cm  LVLd A2C: 8 08 cm  LVLd A4C: 7 9 cm  LVLs A2C: 6 95 cm  LVLs A4C: 6 95 cm  LVPWd: 0 86 cm  RVIDd: 2 68 cm  SV MOD A2C: 45 56 ml  SV MOD A4C: 40 23 ml  SV(Teich): 27 96 ml    CW  AV Env  Ti: 338 73 ms  AV MaxP 3 mmHg  AV VTI: 30 18 cm  AV Vmax: 1 15 m/s  AV Vmean: 0 89 m/s  AV meanPG: 3 52 mmHg    MM  TAPSE: 1 36 cm    PW  E' Sept: 0 05 m/s  E/E' Sept: 15 03  LVOT Env  Ti: 300 67 ms  LVOT VTI: 14 82 cm  LVOT Vmax: 0 73 m/s  LVOT Vmean: 0 49 m/s  LVOT maxP 11 mmHg  LVOT meanP 12 mmHg  MV A [place]: 1 15 m/s  MV Dec [place]: 3 02 m/s2  MV DecT: 226 94 ms  MV E Smith: 0 69 m/s  MV E/A Ratio: 0 59  MV PHT: 65 81 ms  MVA By PHT: 3 34 cm2    [facility]    Prepared and electronically signed by    Mike Kumar MD  Signed 26-May-2021 14:39:30       Meds/Allergies   all current active meds have been reviewed and current meds:   Current Facility-Administered Medications   Medication Dose Route Frequency    acetaminophen (TYLENOL) tablet 650 mg  650 mg Oral Q4H PRN    amLODIPine (NORVASC) tablet 5 mg  5 mg Oral Daily    aspirin chewable tablet 81 mg  81 mg Oral Daily    atorvastatin (LIPITOR) tablet 80 mg  80 mg Oral QPM    enoxaparin (LOVENOX) subcutaneous injection 40 mg  40 mg Subcutaneous Daily    insulin glargine (LANTUS) subcutaneous injection 35 Units 0 35 mL  35 Units Subcutaneous HS    insulin lispro (HumaLOG) 100 units/mL subcutaneous injection 1-5 Units  1-5 Units Subcutaneous TID AC    insulin lispro (HumaLOG) 100 units/mL subcutaneous injection 1-5 Units  1-5 Units Subcutaneous HS    insulin lispro (HumaLOG) 100 units/mL subcutaneous injection 12 Units  12 Units Subcutaneous TID With Meals    losartan (COZAAR) tablet 50 mg  50 mg Oral Daily    metoprolol succinate (TOPROL-XL) 24 hr tablet 50 mg  50 mg Oral Daily     Medications Prior to Admission   Medication    ALPRAZolam (XANAX) 0 5 mg tablet    amLODIPine (NORVASC) 5 mg tablet    aspirin 81 MG tablet    escitalopram (LEXAPRO) 10 mg tablet    glimepiride (AMARYL) 4 mg tablet    lisinopril (ZESTRIL) 2 5 mg tablet    losartan (COZAAR) 50 mg tablet    metoprolol succinate (TOPROL-XL) 50 mg 24 hr tablet    nitroglycerin (NITROSTAT) 0 4 mg SL tablet    sitaGLIPtin (JANUVIA) 50 mg tablet    sitaGLIPtin-metFORMIN (JANUMET)  MG per tablet    furosemide (LASIX) 20 mg tablet    meloxicam (MOBIC) 15 mg tablet     Counseling / Coordination of Care  Total floor / unit time spent today 20 minutes  Greater than 50% of total time was spent with the patient and / or family counseling and / or coordination of care  ** Please Note: Dragon 360 Dictation voice to text software may have been used in the creation of this document   **

## 2021-05-28 NOTE — PLAN OF CARE
Problem: Potential for Falls  Goal: Patient will remain free of falls  Description: INTERVENTIONS:  - Assess patient frequently for physical needs  -  Identify cognitive and physical deficits and behaviors that affect risk of falls    -  [place] fall precautions as indicated by assessment   - Educate patient/family on patient safety including physical limitations  - Instruct patient to call for assistance with activity based on assessment  - Modify environment to reduce risk of injury  - Consider OT/PT consult to assist with strengthening/mobility  Outcome: Progressing     Problem: PAIN - ADULT  Goal: Verbalizes/displays adequate comfort level or baseline comfort level  Description: Interventions:  - Encourage patient to monitor pain and request assistance  - Assess pain using appropriate pain scale  - Administer analgesics based on type and severity of pain and evaluate response  - Implement non-pharmacological measures as appropriate and evaluate response  - Consider cultural and social influences on pain and pain management  - Notify physician/advanced practitioner if interventions unsuccessful or patient reports new pain  Outcome: Progressing     Problem: INFECTION - ADULT  Goal: Absence or prevention of progression during hospitalization  Description: INTERVENTIONS:  - Assess and monitor for signs and symptoms of infection  - Monitor lab/diagnostic results  - Monitor all insertion sites, i e  indwelling lines, tubes, and drains  - Monitor endotracheal if appropriate and nasal secretions for changes in amount and color  - [place] appropriate cooling/warming therapies per order  - Administer medications as ordered  - Instruct and encourage patient and family to use good hand hygiene technique  - Identify and instruct in appropriate isolation precautions for identified infection/condition  Outcome: Progressing  Goal: Absence of fever/infection during neutropenic period  Description: INTERVENTIONS:  - Monitor WBC    Outcome: Progressing     Problem: SAFETY ADULT  Goal: Patient will remain free of falls  Description: INTERVENTIONS:  - Assess patient frequently for physical needs  -  Identify cognitive and physical deficits and behaviors that affect risk of falls  -  [place] fall precautions as indicated by assessment   - Educate patient/family on patient safety including physical limitations  - Instruct patient to call for assistance with activity based on assessment  - Modify environment to reduce risk of injury  - Consider OT/PT consult to assist with strengthening/mobility  Outcome: Progressing  Goal: Maintain or return to baseline ADL function  Description: INTERVENTIONS:  - Assess patient frequently for physical needs  -  Identify cognitive and physical deficits and behaviors that affect risk of falls    -  [place] fall precautions as indicated by assessment   - Educate patient/family on patient safety including physical limitations  - Instruct patient to call for assistance with activity based on assessment  - Modify environment to reduce risk of injury  - Consider OT/PT consult to assist with strengthening/mobility  Outcome: Progressing  Goal: Maintain or return mobility status to optimal level  Description: INTERVENTIONS:  -  Assess patient's ability to carry out ADLs; assess patient's baseline for ADL function and identify physical deficits which impact ability to perform ADLs (bathing, care of mouth/teeth, toileting, grooming, dressing, etc )  - Assess/evaluate cause of self-care deficits   - Assess range of motion  - Assess patient's mobility; develop plan if impaired  - Assess patient's need for assistive devices and provide as appropriate  - Encourage maximum independence but intervene and supervise when necessary  - Involve family in performance of ADLs  - Assess for home care needs following discharge   - Consider OT consult to assist with ADL evaluation and planning for discharge  - Provide patient education as appropriate  Outcome: Progressing     Problem: DISCHARGE PLANNING  Goal: Discharge to home or other facility with appropriate resources  Description: INTERVENTIONS:  - Identify barriers to discharge w/patient and caregiver  - Arrange for needed discharge resources and transportation as appropriate  - Identify discharge learning needs (meds, wound care, etc )  - Arrange for interpretive services to assist at discharge as needed  - Refer to Case Management Department for coordinating discharge planning if the patient needs post-hospital services based on physician/advanced practitioner order or complex needs related to functional status, cognitive ability, or social support system  Outcome: Progressing     Problem: Knowledge Deficit  Goal: Patient/family/caregiver demonstrates understanding of disease process, treatment plan, medications, and discharge instructions  Description: Complete learning assessment and assess knowledge base  Interventions:  - Provide teaching at level of understanding  - Provide teaching via preferred learning methods  Outcome: Progressing     Problem: Neurological Deficit  Goal: Neurological status is stable or improving  Description: Interventions:  - Monitor and assess patient's level of consciousness, motor function, sensory function, and level of assistance needed for ADLs  - Monitor and report changes from baseline  Collaborate with interdisciplinary team to initiate plan and implement interventions as ordered  - Provide and maintain a safe environment  - Consider seizure precautions  - Consider fall precautions  - Consider aspiration precautions  - Consider bleeding precautions  Outcome: Progressing     Problem: Activity Intolerance/Impaired Mobility  Goal: Mobility/activity is maintained at optimum level for patient  Description: Interventions:  - Assess and monitor patient  barriers to mobility and need for assistive/adaptive devices    - Assess patient's emotional response to limitations  - Collaborate with interdisciplinary team and initiate plans and interventions as ordered  - Encourage independent activity per ability   - Maintain proper body alignment  - Perform active/passive rom as tolerated/ordered  - Plan activities to conserve energy   - Turn patient as appropriate  Outcome: Progressing     Problem: Communication Impairment  Goal: Ability to express needs and understand communication  Description: Assess patient's communication skills and ability to understand information  Patient will demonstrate use of effective communication techniques, alternative methods of communication and understanding even if not able to speak  - Encourage communication and provide alternate methods of communication as needed  - Collaborate with case management/ for discharge needs  - Include patient/family/caregiver in decisions related to communication  Outcome: Progressing     Problem: Potential for Aspiration  Goal: Non-ventilated patient's risk of aspiration is minimized  Description: Assess and monitor vital signs, respiratory status, and labs (WBC)  Monitor for signs of aspiration (tachypnea, cough, rales, wheezing, cyanosis, fever)  - Assess and monitor patient's ability to swallow  - Place patient up in chair to eat if possible  - HOB up at 90 degrees to eat if unable to get patient up into chair   - Supervise patient during oral intake  - Instruct patient/ family to take small bites  - Instruct patient/ family to take small single sips when taking liquids  - Follow patient-specific strategies generated by speech pathologist   Outcome: Progressing  Goal: Ventilated patient's risk of aspiration is minimized  Description: Assess and monitor vital signs, respiratory status, airway cuff pressure, and labs (WBC)  Monitor for signs of aspiration (tachypnea, cough, rales, wheezing, cyanosis, fever)      - Elevate head of bed 30 degrees if patient has tube feeding   - Monitor tube feeding  Outcome: Progressing     Problem: Nutrition  Goal: Nutrition/Hydration status is improving  Description: Monitor and assess patient's nutrition/hydration status for malnutrition (ex- brittle hair, bruises, dry skin, pale skin and conjunctiva, muscle wasting, smooth red tongue, and disorientation)  Collaborate with interdisciplinary team and initiate plan and interventions as ordered  Monitor patient's weight and dietary intake as ordered or per policy  Utilize nutrition screening tool and intervene per policy  Determine patient's food preferences and provide high-protein, high-caloric foods as appropriate  - Assist patient with eating   - Allow adequate time for meals   - Encourage patient to take dietary supplement as ordered  - Collaborate with clinical nutritionist   - Include patient/family/caregiver in decisions related to nutrition  Outcome: Progressing     Problem: Nutrition/Hydration-ADULT  Goal: Nutrient/Hydration intake appropriate for improving, restoring or maintaining nutritional needs  Description: Monitor and assess patient's nutrition/hydration status for malnutrition  Collaborate with interdisciplinary team and initiate plan and interventions as ordered  Monitor patient's weight and dietary intake as ordered or per policy  Utilize nutrition screening tool and intervene as necessary  Determine patient's food preferences and provide high-protein, high-caloric foods as appropriate       INTERVENTIONS:  - Monitor oral intake, urinary output, labs, and treatment plans  - Assess nutrition and hydration status and recommend course of action  - Evaluate amount of meals eaten  - Assist patient with eating if necessary   - Allow adequate time for meals  - Recommend/ encourage appropriate diets, oral nutritional supplements, and vitamin/mineral supplements  - Order, calculate, and assess calorie counts as needed  - Recommend, monitor, and adjust tube feedings and TPN/PPN based on assessed needs  - Assess need for intravenous fluids  - Provide specific nutrition/hydration education as appropriate  - Include patient/family/caregiver in decisions related to nutrition  Outcome: Progressing

## 2021-05-28 NOTE — ASSESSMENT & PLAN NOTE
· Na 133 on presentation, corrected Na for hyperglycemia is 140     · Resolved    Results from last 7 days   Lab Units 05/28/21  0448 05/27/21  0542 05/26/21  0549   SODIUM mmol/L 143 143 141

## 2021-05-28 NOTE — ASSESSMENT & PLAN NOTE
Lab Results   Component Value Date    HGBA1C >14 0 (H) 2021       Recent Labs     21  1546 21  2103 21  0725 21  1059   POCGLU 240* 222* 135 227*       Blood Sugar Average: Last 72 hrs:  (P) 278 [id number]    · Blood glucose 534 on BMP on presentation  · Patient reports that she ran out of her medications about 1 week ago  Her home medication regimen includes Janumet and Amaryl  She admits to polydipsia and polyuria over the past few days  · AG noted to be elevated to 15 on presentation but urine negative for ketones  · Received 10 units of insulin in the ED as well as a 1L bolus of NS    · A1c 2021: >14  · PTA on: Glimepiride 4mg BID, sitagliptin-metformin 50-1000mg QD    Plan:   Insulin regimen  o Short acting: Humalog 12 units before meals  o Long actin units of Lantus q h s     Patient is to continue sitagliptin metformin on discharge   Diabetic Diet   Patient is to follow up with endocrinology as an outpatient

## 2021-05-28 NOTE — ASSESSMENT & PLAN NOTE
· SBP 190s on presentation  · Patient reports that she has not taken her medications the past 1 week as she ran out of them  · Received her home medications in the ED, amlodipine 5 mg, losartan 50 mg and metoprolol succinate 50 mg and BP has improved    · PTA antihypertensives restarted

## 2021-05-28 NOTE — ASSESSMENT & PLAN NOTE
- Lipid Profile 05/26/2021:  Total Cholesterol 283 / Triglycerides 249 / HDL 33 /   - PTA on losartan 50 mg daily, discontinued and started the patient on atorvastatin 80 mg daily

## 2021-05-28 NOTE — PROGRESS NOTES
Progress Note - Mica Garcia 64 y o  female MRN: [id number]    Unit/Bed#: S -01 Encounter: [id number]      CC: diabetes f/u    Subjective:   Basim Meyers is a 64y o  year old female with type 2 diabetes  Feels well  No complaints  No hypoglycemia  Planned to be discharged home today    Objective:     Vitals: Blood pressure 127/78, pulse 76, temperature 98 2 °F (36 8 °C), temperature source Oral, resp  rate 18, height 5' 5" (1 651 m), weight 69 5 kg (153 lb 3 2 oz), SpO2 95 %  ,Body mass index is 25 49 kg/m²  Intake/Output Summary (Last 24 hours) at 5/28/2021 1432  Last data filed at 5/28/2021 1300  Gross per 24 hour   Intake 480 ml   Output 0 ml   Net 480 ml       Physical Exam:  General Appearance: awake, appears stated age and cooperative  Head: Normocephalic, without obvious abnormality, atraumatic  Extremities: moves all extremities  Skin: Skin color and temperature normal    Pulm: no labored breathing    Lab, Imaging and other studies: I have personally reviewed pertinent reports  POC Glucose (mg/dl)   Date Value   05/28/2021 227 (H)   05/28/2021 135   05/27/2021 222 (H)   05/27/2021 240 (H)   05/27/2021 334 (H)   05/27/2021 256 (H)   05/26/2021 257 (H)   05/26/2021 345 (H)   05/26/2021 351 (H)   05/26/2021 382 (H)       Assessment and Plan:  1  Type 2 diabetes mellitus on long-term insulin therapy with hyperglycemia  Poorly controlled with last A1c > 14%  -continue current regimen  -on discharge, discontinue Janumet, start metformin 1000 mg orally twice a day  -follow-up with Endocrinology as an outpatient    2  Hypertension  3  Hyperlipidemia    Portions of the record may have been created with voice recognition software  Occasional wrong word or "sound a like" substitutions may have occurred due to the inherent limitations of voice recognition software  Read the chart carefully and recognize, using context, where substitutions have occurred

## 2021-05-28 NOTE — ASSESSMENT & PLAN NOTE
· Presented after an episode of word finding difficulty, blurry vision and dizziness  Symptoms reportedly lasted about an hour and a half prior to resolving  Most likely the her symptoms were due to hypertensive urgency vs symptomatic hyperglycemia  Unlikely TIA but cannot fully rule it out  · CTA head and neck was a normal study  · Echo 7/54: Systolic function mildly reduced  EF 45 % to 50 %, mildly increased LV wall, grade 1 diastolic dysfunction  No evidence of apical thrombus  · Per Neurology they will defer MRI brain due to nonfocal exam and symptoms likely being secondary to HTN and metabolic disturbances  · F1E 5/26/2021: >14  · Lipid Profile 05/26/2021:  Total Cholesterol 283 / Triglycerides 249 / HDL 33 /   · BP elevated on presentation

## 2021-05-28 NOTE — DISCHARGE SUMMARY
[facility]  Discharge- Kelsey Garcia 1960, 64 y o  female MRN: [id number]  Unit/Bed#: S -01 Encounter: [id number]  Primary Care Provider: Yee Cates MD   Date and time admitted to hospital: 5/25/2021  4:44 PM    Nonsustained paroxysmal ventricular tachycardia ([facility] [address] )  Assessment & Plan  - Pt had multiple episodes of asymptomatic NSPVT on telemetry, appear to have subsided following restarting of metoprolol  - Continue Metoprolol XL  - Cardiology consulted, patient to follow up with prior cardiologist at [facility] on d/c    CHF (congestive heart failure) ([facility] [address] )  Assessment & Plan  - Pt reports history of an episode of "acute heart failure years ago" that subsequently resolved  - Per records from [facility] 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% that subsequently normalized to 50% following medical therapy  - Prior Studies:   - 8/21/15: Stress ECHO: The hemodynamic response to exercise was normal  No cp with exercise  No ekg changes diagnostic for ischemia  No evidence for ischemia on perfusion imaging  Normal LV systolic function  - 1/17/2016 Cardiac Cath: LVEF 25-30 % and no significant evidence of CAD     - 1/17/2016 Echocardiogram: LVEF 35%-40%  Trace MR and trace TR  Normal IVC   - 02/04/2016 Echocardiogram: LVEF 50% - no valvular disease  - Echo 8/66/4242: Systolic function mildly reduced  EF 45 % to 50 %, mildly increased LV wall, grade 1 diastolic dysfunction  No evidence of apical thrombus  - Pt is on amlodipine, metoprolol, and losartan, continue  - Patient is not reporting any symptoms of CHF and appears euvolemic  - Pt advised to follow HF diet, 2 gram Na diet, fluid restriction and daily weights  - Pt to follow up with her previous cardiologist at [facility] on D/C    HLD (hyperlipidemia)  Assessment & Plan  - Lipid Profile 05/26/2021:  Total Cholesterol 283 / Triglycerides 249 / HDL 33 /   - PTA on losartan 50 mg daily, discontinued and started the patient on atorvastatin 80 mg daily    Prolonged QT interval  Assessment & Plan  · QTc prolonged at 490 on EKG  Prior EKG from 1/2013 with QTc of 472  · Patient is on Lexapro as an outpatient which will be held and pt will be advised to follow up as an outpatient with her cardiologist and or internist regarding restarting it  · Avoid QT prolonging medications    Recent Labs     05/25/21  1659 05/26/21  0018 05/26/21  0019   QTCINT 490 477 489         Hyponatremia in the setting of hyperglycemia  Assessment & Plan  · Na 133 on presentation, corrected Na for hyperglycemia is 140  · Resolved    Results from last 7 days   Lab Units 05/28/21  0448 05/27/21  0542 05/26/21  0549   SODIUM mmol/L 143 143 141         Hypertensive urgency  Assessment & Plan  · SBP 190s on presentation  · Patient reports that she has not taken her medications the past 1 week as she ran out of them  · Received her home medications in the ED, amlodipine 5 mg, losartan 50 mg and metoprolol succinate 50 mg and BP has improved  · PTA antihypertensives restarted    Word finding difficulty  Assessment & Plan  · Presented after an episode of word finding difficulty, blurry vision and dizziness  Symptoms reportedly lasted about an hour and a half prior to resolving  Most likely the her symptoms were due to hypertensive urgency vs symptomatic hyperglycemia  Unlikely TIA but cannot fully rule it out  · CTA head and neck was a normal study  · Stroke pathway  · Monitor neuro checks per protocol and notify of any changes  · Echo 5/06: Systolic function mildly reduced  EF 45 % to 50 %, mildly increased LV wall, grade 1 diastolic dysfunction  No evidence of apical thrombus  · Neurology consulted  Per Neurology they will defer MRI brain due to nonfocal exam and symptoms likely being secondary to HTN and metabolic disturbances  · V1A 5/26/2021: >14  · Lipid Profile 05/26/2021:  Total Cholesterol 283 / Triglycerides 249 / HDL 33 /   · BP elevated on presentation    * Uncontrolled type 2 diabetes mellitus with hyperglycemia [facility])  Assessment & Plan  Lab Results   Component Value Date    HGBA1C >14 0 (H) 2021       Recent Labs     21  1546 21  2103 21  0725 21  1059   POCGLU 240* 222* 135 227*       Blood Sugar Average: Last 72 hrs:  (P) 278 [id number]    · Blood glucose 534 on BMP on presentation  · Patient reports that she ran out of her medications about 1 week ago  Her home medication regimen includes Janumet and Amaryl  She admits to polydipsia and polyuria over the past few days  · AG noted to be elevated to 15 on presentation but urine negative for ketones  · Received 10 units of insulin in the ED as well as a 1L bolus of NS    · A1c 2021: >14  · PTA on: Glimepiride 4mg BID, sitagliptin-metformin 50-1000mg QD    Plan:   Insulin regimen  o Short acting: Humalog 12 units before meals  o Long actin units of Lantus q h s   Patient is to continue sitagliptin metformin on discharge   Diabetic Diet   Patient is to follow up with endocrinology as an outpatient      Discharging Resident Physician: Ragini Conway DO  Attending: Lacho Gutierres MD  PCP: Calin Betancourt MD  Admission Date: 2021  Discharge Date: 21    Disposition:     Home    Reason for Admission: Speech difficulty, hyperglycemia and uncontrolled hypertension, need for blood sugar control and stroke rule out with neuro eval    Consultations During Hospital Stay:  · Neurology, Cardiology, Endocrinology    Procedures Performed:     · None    Significant Findings / Test Results:     - A1C >14  - Lipid Profile 2021: Total Cholesterol 283 / Triglycerides 249 / HDL 33 /   - Glucose 534 on admission    Cta Head And Neck With And Without Contrast    Result Date: 2021  Impression: Normal CT of the brain  Normal CTA of the neck and brain   Workstation performed: [id number]     Results for orders placed during the hospital encounter of 21   Echo complete with contrast if indicated    Narrative [address] [facility]  [phone]    Transthoracic Echocardiogram  2D, M-mode, Doppler, and Color Doppler    Study date:  26-May-2021    Patient: Suman Talbot  MR number: [id number]  Account number: [de-identified]  : 1960  Age: 64 years  Gender: Female  Status: Inpatient  Location: Bedside  Height: 65 in  Weight: 149 6 lb  BP: 115/ 67 mmHg    Indications: TIA    Diagnoses: G45 9 - Transient cerebral ischemic attack, unspecified    Sonographer:  Norberto Fierro RDCS  Primary Physician:  Carlos Carolina MD  Referring Physician:  Wilma Wharton PA-C  Group:  [facility]  Interpreting Physician:  Joaquin Pizarro MD    IMPRESSIONS:  There was no echocardiographic evidence for a cardiac source of embolism  SUMMARY    LEFT VENTRICLE:  Systolic function was mildly reduced  Ejection fraction was estimated in the range of 45 % to 50 %  Wall thickness was mildly increased  Doppler parameters were consistent with abnormal left ventricular relaxation (grade 1 diastolic dysfunction)  No evidence of apical thrombus  RIGHT VENTRICLE:  Systolic function was mildly reduced  LEFT ATRIUM:  No thrombus was identified  ATRIAL SEPTUM:  No defect or patent foramen ovale was identified  MITRAL VALVE:  There was trace regurgitation  AORTIC VALVE:  There was no evidence for stenosis  HISTORY: PRIOR HISTORY: DM2, HTN, HF    PROCEDURE: The procedure was performed at the bedside  This was a routine study  The transthoracic approach was used  The study included complete 2D imaging, M-mode, complete spectral Doppler, and color Doppler  The heart rate was 67 bpm,  at the start of the study  Images were obtained from the parasternal, apical, subcostal, and suprasternal notch acoustic windows  Image quality was adequate      LEFT VENTRICLE: Size was normal  Systolic function was mildly reduced  Ejection fraction was estimated in the range of 45 % to 50 %  Wall thickness was mildly increased  No evidence of apical thrombus  DOPPLER: Doppler parameters were  consistent with abnormal left ventricular relaxation (grade 1 diastolic dysfunction)  RIGHT VENTRICLE: The size was normal  Systolic function was mildly reduced  Wall thickness was normal     LEFT ATRIUM: Size was normal  No thrombus was identified  ATRIAL SEPTUM: No defect or patent foramen ovale was identified  RIGHT ATRIUM: Size was normal     MITRAL VALVE: Valve structure was normal  There was normal leaflet separation  DOPPLER: The transmitral velocity was within the normal range  There was no evidence for stenosis  There was trace regurgitation  AORTIC VALVE: The valve was trileaflet  Leaflets exhibited normal thickness and normal cuspal separation  DOPPLER: Transaortic velocity was within the normal range  There was no evidence for stenosis  There was no significant  regurgitation  TRICUSPID VALVE: The valve structure was normal  There was normal leaflet separation  DOPPLER: The transtricuspid velocity was within the normal range  There was no evidence for stenosis  There was no significant regurgitation  PULMONIC VALVE: Not well visualized  DOPPLER: The transpulmonic velocity was within the normal range  There was no significant regurgitation  PERICARDIUM: There was no pericardial effusion  The pericardium was normal in appearance  AORTA: The root exhibited normal size  SYSTEMIC VEINS: IVC: The inferior vena cava was normal in size   Respirophasic changes were normal     SYSTEM MEASUREMENT TABLES    2D  %FS: 11 09 %  Ao Diam: 2 68 cm  EDV(Teich): 116 38 ml  EF Biplane: 50 01 %  EF(Teich): 24 03 %  ESV(Teich): 88 41 ml  IVSd: 1 1 cm  LA Diam: 3 74 cm  LAAs A4C: 16 69 cm2  LAESV A-L A4C: 55 67 ml  LAESV MOD A4C: 52 68 ml  LALs A4C: 4 25 cm  LVEDV MOD A2C: 87 82 ml  LVEDV MOD A4C: 86 17 ml  LVEDV MOD BP: 87 88 ml  LVEDVInd MOD BP: 50 21 ml/m2  LVEF MOD A2C: 51 89 %  LVEF MOD A4C: 46 69 %  LVESV MOD A2C: 42 25 ml  LVESV MOD A4C: 45 94 ml  LVESV MOD BP: 43 93 ml  LVESVInd MOD BP: 25 1 ml/m2  LVIDd: 4 97 cm  LVIDs: 4 42 cm  LVLd A2C: 8 08 cm  LVLd A4C: 7 9 cm  LVLs A2C: 6 95 cm  LVLs A4C: 6 95 cm  LVPWd: 0 86 cm  RVIDd: 2 68 cm  SV MOD A2C: 45 56 ml  SV MOD A4C: 40 23 ml  SV(Teich): 27 96 ml    CW  AV Env  Ti: 338 73 ms  AV MaxP 3 mmHg  AV VTI: 30 18 cm  AV Vmax: 1 15 m/s  AV Vmean: 0 89 m/s  AV meanPG: 3 52 mmHg    MM  TAPSE: 1 36 cm    PW  E' Sept: 0 05 m/s  E/E' Sept: 15 03  LVOT Env  Ti: 300 67 ms  LVOT VTI: 14 82 cm  LVOT Vmax: 0 73 m/s  LVOT Vmean: 0 49 m/s  LVOT maxP 11 mmHg  LVOT meanP 12 mmHg  MV A Smith: 1 15 m/s  MV Dec Harding: 3 02 m/s2  MV DecT: 226 94 ms  MV E Smith: 0 69 m/s  MV E/A Ratio: 0 59  MV PHT: 65 81 ms  MVA By PHT: 3 34 cm2    [employer] Accredited [facility]    Prepared and electronically signed by    Phyllis Kanner, MD  Signed 26-May-2021 14:39:30       Incidental Findings:   · None     Test Results Pending at Discharge (will require follow up): · None     Outpatient Tests Requested:  · None    Complications:  None    Hospital Course:     Julissa Paulino is a 64 y o  female patient who originally presented to the hospital on 2021 due to speech difficulty and dizziness  Patient reports that she was in her usual state of health until the afternoon of presentation when she was at work and began to feel disoriented and then notes that she had difficulty speaking  She reports that there were people talking to her but she had word finding difficulty and notes a delay in her answers  She states that when she was able to speak her voice sounded different  She also notes feeling dizzy and generally weak at that time and reports some blurred vision as well    She denies ever having had similar symptoms in the past and reports that her symptoms lasted for about an hour and half and resolved on presentation to the hospital  She reportedly ran out of all of her medications about 1 week prior to presentation  and attempted to refill them but there was no refills available  In the emergency room the patient's SBP on presentation was noted to be in the 190s, glucose in the mid 500s, AG noted to be elevated to 15 on presentation but urine negative for ketones  Received 10 units of insulin in the ED as well as a 1L bolus of NS  CTA head and neck was a normal study  Subsequent A1c came back with the results greater than 14  Endocrinology was consulted and provided recommendations for home dosing of insulin  Neurology evaluated the patient and felt that her symptoms were due to hypertensive urgency vs symptomatic hyperglycemia  Unlikely TIA but cannot fully rule it out  Due to the fact that the patient was started on the stroke pathway her home antihypertensives, including her metoprolol had been held  The patient was on telemetry monitoring and was noted to have had multiple episodes of asymptomatic NSPVT on telemetry, Cardiology was consulted and agreed with the restarting of her metoprolol and the and has NSPVT episodes appear to have subsided following restarting of metoprolol  The patient is provided with instructions regarding her change in medications and provided she should follow up with on discharge  Condition at Discharge: good     Discharge Day Visit / Exam:     Subjective:  Patient was seen examined at bedside  The patient reports that she is feeling well  The patient and the team discussed the changes to her medications and the per bed is that she would need to follow-up with  The patient acknowledged understanding these  Patient denies  any headache, dizziness, nausea, vomiting, constipation, diarrhea, chest pain, palpitations, shortness of breath, wheezing   A 12 point review of systems was completed and was negative unless noted above  Vitals: Blood Pressure: 127/78 (05/28/21 0723)  Pulse: 76 (05/28/21 0723)  Temperature: 98 2 °F (36 8 °C) (05/28/21 0723)  Temp Source: Oral (05/28/21 0723)  Respirations: 18 (05/28/21 0723)  Height: 5' 5" (165 1 cm) (05/26/21 0500)  Weight - Scale: 69 5 kg (153 lb 3 2 oz) (05/28/21 0544)  SpO2: 95 % (05/28/21 0723)  Exam:   Physical Exam  Vitals signs and nursing note reviewed  Constitutional:       General: She is not in acute distress  Appearance: She is well-developed  She is not diaphoretic  HENT:      Head: Normocephalic and atraumatic  Nose: Nose normal    Eyes:      General:         Right eye: No discharge  Left eye: No discharge  Conjunctiva/sclera: Conjunctivae normal    Neck:      Musculoskeletal: Normal range of motion  Cardiovascular:      Rate and Rhythm: Normal rate and regular rhythm  Heart sounds: No murmur  No friction rub  No gallop  Pulmonary:      Effort: Pulmonary effort is normal  No respiratory distress  Breath sounds: Normal breath sounds  Abdominal:      General: Abdomen is flat  Palpations: Abdomen is soft  Musculoskeletal: Normal range of motion  Right lower leg: No edema  Left lower leg: No edema  Skin:     General: Skin is warm and dry  Neurological:      General: No focal deficit present  Mental Status: She is alert and oriented to person, place, and time  Psychiatric:         Behavior: Behavior normal          Thought Content: Thought content normal          Judgment: Judgment normal           Discussion with Family:  Patient declined    Discharge instructions/Information to patient and family:   See after visit summary for information provided to patient and family  Provisions for Follow-Up Care:  See after visit summary for information related to follow-up care and any pertinent home health orders        Planned Readmission:  None     Discharge Medications:  See after visit summary for reconciled discharge medications provided to patient and family        ** Please Note: This note has been constructed using a voice recognition system **

## 2021-05-28 NOTE — ASSESSMENT & PLAN NOTE
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% that subsequently normalized to 50% following medical therapy  - Prior Studies:   - 8/21/15: Stress ECHO: The hemodynamic response to exercise was normal  No cp with exercise  No ekg changes diagnostic for ischemia  No evidence for ischemia on perfusion imaging  Normal LV systolic function  - 1/17/2016 Cardiac Cath: LVEF 25-30 % and no significant evidence of CAD     - 1/17/2016 Echocardiogram: LVEF 35%-40%  Trace MR and trace TR  Normal IVC   - 02/04/2016 Echocardiogram: LVEF 50% - no valvular disease  - Echo 5/17/6406: Systolic function mildly reduced  EF 45 % to 50 %, mildly increased LV wall, grade 1 diastolic dysfunction  No evidence of apical thrombus    - Pt is on amlodipine, metoprolol, and losartan, continue  - Patient is not reporting any symptoms of CHF and appears euvolemic  - Pt advised to follow HF diet, 2 gram Na diet, fluid restriction and daily weights  - Pt to follow up with her previous cardiologist at [facility] on D/C

## 2021-05-28 NOTE — CASE MANAGEMENT
Orders for insulin sent to [facility] pharmacy; call to follow-up on cost ([phone]) - spoke with pharmacy, Doug Eason, who relayed that humalog and lantus came to $10 each  Insulin and all other meds to be ready for pick-up within an hour  Patient informed of same  No other d/c needs identified      NIYA Leong  5/28/2021  12:49 PM

## 2021-06-16 ENCOUNTER — OFFICE VISIT (OUTPATIENT)
Dept: ENDOCRINOLOGY | Facility: CLINIC | Age: 61
End: 2021-06-16
Payer: COMMERCIAL

## 2021-06-16 VITALS
TEMPERATURE: 98.6 F | DIASTOLIC BLOOD PRESSURE: 72 MMHG | BODY MASS INDEX: 24.99 KG/M2 | SYSTOLIC BLOOD PRESSURE: 136 MMHG | HEIGHT: 65 IN | WEIGHT: 150 LBS | HEART RATE: 90 BPM

## 2021-06-16 DIAGNOSIS — H53.8 BLURRY VISION: ICD-10-CM

## 2021-06-16 DIAGNOSIS — R19.7 DIARRHEA, UNSPECIFIED TYPE: ICD-10-CM

## 2021-06-16 DIAGNOSIS — E11.649 UNCONTROLLED TYPE 2 DIABETES MELLITUS WITH HYPOGLYCEMIA WITHOUT COMA (HCC): Primary | ICD-10-CM

## 2021-06-16 DIAGNOSIS — I10 ESSENTIAL HYPERTENSION: ICD-10-CM

## 2021-06-16 DIAGNOSIS — E78.2 MIXED HYPERLIPIDEMIA: ICD-10-CM

## 2021-06-16 DIAGNOSIS — I50.22 CHRONIC SYSTOLIC CONGESTIVE HEART FAILURE (HCC): ICD-10-CM

## 2021-06-16 PROCEDURE — 99205 OFFICE O/P NEW HI 60 MIN: CPT | Performed by: INTERNAL MEDICINE

## 2021-06-16 RX ORDER — INSULIN GLARGINE 100 [IU]/ML
15 INJECTION, SOLUTION SUBCUTANEOUS
Qty: 15 ML | Refills: 3 | Status: SHIPPED | OUTPATIENT
Start: 2021-06-16 | End: 2022-04-20

## 2021-06-16 RX ORDER — METFORMIN HYDROCHLORIDE 500 MG/1
1000 TABLET, EXTENDED RELEASE ORAL 2 TIMES DAILY WITH MEALS
Qty: 120 TABLET | Refills: 1 | Status: SHIPPED | OUTPATIENT
Start: 2021-06-16 | End: 2021-08-16

## 2021-06-16 RX ORDER — BLOOD SUGAR DIAGNOSTIC
1 STRIP MISCELLANEOUS 3 TIMES DAILY
COMMUNITY

## 2021-06-16 RX ORDER — INSULIN LISPRO 100 [IU]/ML
6 INJECTION, SOLUTION INTRAVENOUS; SUBCUTANEOUS
Qty: 15 ML | Refills: 3 | Status: SHIPPED | OUTPATIENT
Start: 2021-06-16

## 2021-06-16 NOTE — PROGRESS NOTES
Kesha Garcia 64 y o  female MRN: [id number]    Encounter: [id number]      Assessment/Plan     1  Type 2 diabetes mellitus on insulin therapy with hypoglycemia   2  Diarrhea, flatulence  3   blurriness in vision  Poorly controlled with last A1c >14%     Recommend the following for now   -  Trial of metformin XR   - Continue Lantus 15 units subcutaneously at bedtime  - Decrease Humalog to 6 units with meals 3 times a day   - Start sliding scale insulin   - follow-up in 4 weeks   - Follow-up with Ophthalmology  - will plan to repeat A1c, BMP in 8 weeks    4  Hyperlipidemia-continue statin   Check fasting lipid panel in 2-3 months     5  Hypertension- blood pressure at goal   Continue current medications   Check BMP, urine microalbumin with next set of labs     6  CHF     Follow up in 4 weeks    CC: diabetes mellitus     History of Present Illness     HPI:  Mary Mcdowell is a 64 y o  female who is here for a hospital follow-up of diabetes mellitus     Patient was initially seen as a consult in the hospital 05/27/2021 When she was admitted with word-finding difficulty, dizziness    On presentation found to have hypertensive urgency, hyperglycemia with blood sugar >500 mg/dl,  Not in DKA   Patient was started on insulin while admitted     Diagnosed with diabetes mellitus approximately 5-6 years ago  Previously following up with primary care physician      Currently on the following  Lantus 15 units subcutaneously at bedtime  Humalog 10 units with meals 3 times a day   Metformin 1 g orally twice a day     On this regimen for 1 5 weeks  Says she was advised to take lantus 15 ujints BID by primary care physician but did not know when to take the second dose so has been taking it at bedtime only     Hypoglycemia symptoms: Shaky, sweaty, legs feel weak   often around 9 am she feels she is having low BG  While at work and then around 5 pm when she on on her way back home and eats a snack before checking pre-dinner BG   Noticed tingling in the medial 3 fingers of the hands bilaterally   No symptoms in the feet   No CP/SOB   alternate days has loose stools/diarrhea, flatulence since the hosputal stay   Did not have earlier on janmuet     Has been eating better, portion control   Eating more veggies, fish( baked), salad  Has been walking for exercise - most days   Active at work   Weight has been stable/ lost a few lbs     BG log   6/7   98 122 138   112 91 215   102 93 236   124 74 -- 97   147 -- --   124  222   139   "Low"    ----   156    89    Statin - lipitor   ARB - losratn     C/o blurriness in vision - will be going for an eye exam tomorrow      All other systems were reviewed and are negative  Review of Systems    Historical Information   Past Medical History:   Diagnosis Date    [facility]     [facility]     Hypertension      History reviewed  No pertinent surgical history  Social History   Social History     Substance and Sexual Activity   Alcohol Use Yes    Comment: social     Social History     Substance and Sexual Activity   Drug Use Never     Social History     Tobacco Use   Smoking Status Former Smoker   Smokeless Tobacco Former User     Family History: History reviewed  No pertinent family history  Meds/Allergies   Current Outpatient Medications   Medication Sig Dispense Refill    Alcohol Swabs 70 % PADS May substitute brand based on insurance coverage  Check glucose TID   100 each 0    ALPRAZolam (XANAX) 0 5 mg tablet Take 0 5 mg by mouth daily as needed      amLODIPine (NORVASC) 5 mg tablet Take 1 tablet (5 mg total) by mouth daily 30 tablet 0    aspirin 81 MG tablet Take 81 mg by mouth daily      atorvastatin (LIPITOR) 80 mg tablet Take 1 tablet (80 mg total) by mouth every evening 30 tablet 0    glucose blood (Contour Next Test) test strip Use 1 each 3 (three) times a day Use as instructed      insulin glargine (Lantus SoloStar) 100 units/mL injection pen Inject 35 Units under the skin daily at bedtime (Patient taking differently: Inject 15 Units under the skin daily at bedtime 15 units in pm) 15 mL 0    insulin lispro (HumaLOG KwikPen) 100 units/mL injection pen Inject 12 Units under the skin 3 (three) times a day with meals (Patient taking differently: Inject 10 Units under the skin 3 (three) times a day with meals ) 15 mL 0    Insulin Pen Needle (BD Pen Needle Megan 2nd Gen) 32G X 4 MM MISC For use with insulin pen  Pharmacy may dispense brand covered by insurance  100 each 0    losartan (COZAAR) 50 mg tablet Take 1 tablet (50 mg total) by mouth daily 30 tablet 0    metFORMIN (GLUCOPHAGE) 1000 MG tablet Take 1 tablet (1,000 mg total) by mouth 2 (two) times a day with meals 60 tablet 0    metoprolol succinate (TOPROL-XL) 50 mg 24 hr tablet Take 1 tablet (50 mg total) by mouth daily 30 tablet 0    nitroglycerin (NITROSTAT) 0 4 mg SL tablet Place 0 4 mg under the tongue      glucose blood (FREESTYLE TEST STRIPS) test strip May substitute brand based on insurance coverage  Check glucose TID  (Patient not taking: Reported on 6/16/2021) 100 each 0    glucose monitoring kit (FREESTYLE) monitoring kit May substitute brand based on insurance coverage  Check glucose TID  (Patient not taking: Reported on 6/16/2021) 1 each 0    Insulin Pen Needle (BD Pen Needle Megan 2nd Gen) 32G X 4 MM MISC For use with insulin pen  Pharmacy may dispense brand covered by insurance  100 each 0    Lancets (freestyle) lancets May substitute brand based on insurance coverage  Check glucose TID  (Patient not taking: Reported on 6/16/2021) 100 each 0     No current facility-administered medications for this visit  No Known Allergies    Objective   Vitals: Blood pressure 136/72, pulse 90, temperature 98 6 °F (37 °C), height 5' 5" (1 651 m), weight 68 kg (150 lb)  Physical Exam    The history was obtained from the review of the chart, patient      Lab Results:   Lab Results   Component Value Date/Time    TSH 3RD GENERATON 3 190 05/26/2021 05:49 AM     Lab Results   Component Value Date    WBC 8 12 05/26/2021    HGB 14 0 05/26/2021    HCT 42 7 05/26/2021    MCV 90 05/26/2021     05/26/2021     Lab Results   Component Value Date    CREATININE 0 83 05/28/2021    BUN 21 05/28/2021    K 3 7 05/28/2021     (H) 05/28/2021    CO2 24 05/28/2021     Lab Results   Component Value Date    HGBA1C >14 0 (H) 05/26/2021         Imaging Studies:       I have personally reviewed pertinent reports  Portions of the record may have been created with voice recognition software  Occasional wrong word or "sound a like" substitutions may have occurred due to the inherent limitations of voice recognition software  Read the chart carefully and recognize, using context, where substitutions have occurred

## 2021-06-16 NOTE — PATIENT INSTRUCTIONS
Will change metformin to extended release -if you tolerate this better and do not have get any gastric side effects please let me know   Continue Lantus 15 units subcutaneously at bedtime  Decrease Humalog to 6 units with meals 3 times a day     In addition, please use humalog insulin per the following sliding scale to correct high blood sugars:  BG  151-200: 1 unit  201-250: 2 units  251-300: 3 units  301-350: 4 units  > 350: 5 units  Do not correct bed time highs unless > 200 mg/dl       Follow up in 4 weeks   If you have persistent high or low blood sugars, please send me on log for review prior to follow-up

## 2021-06-28 LAB
ATRIAL RATE: 72 BPM
P AXIS: 51 DEGREES
PR INTERVAL: 152 MS
QRS AXIS: 60 DEGREES
QRSD INTERVAL: 78 MS
QT INTERVAL: 438 MS
QTC INTERVAL: 479 MS
T WAVE AXIS: 108 DEGREES
VENTRICULAR RATE: 72 BPM

## 2021-06-28 PROCEDURE — 93010 ELECTROCARDIOGRAM REPORT: CPT | Performed by: INTERNAL MEDICINE

## 2021-07-01 NOTE — UTILIZATION REVIEW
Notification of Discharge   This is a Notification of Discharge from our facility [address]  Please be advised that this patient has been discharge from our facility  Below you will find the admission and discharge date and time including the patients disposition  UTILIZATION REVIEW CONTACT:  Cassie Mensah  Utilization   Network Utilization Review Department  Phone: [phone] x carefully listen to the prompts  All voicemails are confidential   Email: [email or web address]     PHYSICIAN ADVISORY SERVICES:  FOR [id number] REVIEW - MEDICAL NECESSITY DENIAL  Phone: [phone]  Fax: [phone]  Email: [email or web address]     PRESENTATION DATE: 5/25/2021  4:44 PM  OBERVATION ADMISSION DATE:   INPATIENT ADMISSION DATE: 5/25/21  8:59 PM   DISCHARGE DATE: 5/28/2021  5:44 PM  DISPOSITION: Home/Self Care Home/Self Care      IMPORTANT INFORMATION:  Send all requests for admission clinical reviews, approved or denied determinations and any other requests to dedicated fax number below belonging to the campus where the patient is receiving treatment   List of dedicated fax numbers:  [address] DENIALS (Administrative/Medical Necessity) [phone]   [address] [place] [address] (Maternity/NICU/Pediatrics) [phone]   Pepe Renee [phone]   Particia Notice [phone]   Garry Rain [phone]   Marjan Mckinney [facility] [address] [facility] [phone]   [facility]  [phone]   [address] [facility] [address]  [address] [facility] [address] [facility] [phone]

## 2021-08-16 DIAGNOSIS — E11.649 UNCONTROLLED TYPE 2 DIABETES MELLITUS WITH HYPOGLYCEMIA WITHOUT COMA (HCC): ICD-10-CM

## 2021-08-16 RX ORDER — METFORMIN HYDROCHLORIDE 500 MG/1
TABLET, EXTENDED RELEASE ORAL
Qty: 120 TABLET | Refills: 1 | Status: SHIPPED | OUTPATIENT
Start: 2021-08-16

## 2021-10-13 ENCOUNTER — OFFICE VISIT (OUTPATIENT)
Dept: ENDOCRINOLOGY | Facility: CLINIC | Age: 61
End: 2021-10-13
Payer: COMMERCIAL

## 2021-10-13 VITALS
SYSTOLIC BLOOD PRESSURE: 118 MMHG | HEIGHT: 65 IN | TEMPERATURE: 97 F | WEIGHT: 155 LBS | HEART RATE: 70 BPM | DIASTOLIC BLOOD PRESSURE: 78 MMHG | BODY MASS INDEX: 25.83 KG/M2

## 2021-10-13 DIAGNOSIS — Z79.4 TYPE 2 DIABETES MELLITUS TREATED WITH INSULIN (HCC): ICD-10-CM

## 2021-10-13 DIAGNOSIS — E78.2 MIXED HYPERLIPIDEMIA: Primary | ICD-10-CM

## 2021-10-13 DIAGNOSIS — I50.22 CHRONIC SYSTOLIC CONGESTIVE HEART FAILURE (HCC): ICD-10-CM

## 2021-10-13 DIAGNOSIS — E11.9 TYPE 2 DIABETES MELLITUS TREATED WITH INSULIN (HCC): ICD-10-CM

## 2021-10-13 DIAGNOSIS — I10 ESSENTIAL HYPERTENSION: ICD-10-CM

## 2021-10-13 PROCEDURE — 99214 OFFICE O/P EST MOD 30 MIN: CPT | Performed by: INTERNAL MEDICINE

## 2021-10-13 RX ORDER — DAPAGLIFLOZIN AND METFORMIN HYDROCHLORIDE 5; 1000 MG/1; MG/1
TABLET, FILM COATED, EXTENDED RELEASE ORAL
COMMUNITY
Start: 2021-09-05

## 2021-10-13 RX ORDER — FUROSEMIDE 20 MG/1
20 TABLET ORAL
COMMUNITY
Start: 2021-09-22 | End: 2022-09-22

## 2021-10-13 RX ORDER — FUROSEMIDE 40 MG/1
TABLET ORAL
COMMUNITY

## 2021-12-21 LAB
ALBUMIN/CREAT UR: 7 MG/G CREAT (ref 0–29)
BUN SERPL-MCNC: 20 MG/DL (ref 8–27)
BUN/CREAT SERPL: 20 (ref 12–28)
CALCIUM SERPL-MCNC: 9.4 MG/DL (ref 8.7–10.3)
CHLORIDE SERPL-SCNC: 105 MMOL/L (ref 96–106)
CHOLEST SERPL-MCNC: 105 MG/DL (ref 100–199)
CO2 SERPL-SCNC: 21 MMOL/L (ref 20–29)
CREAT SERPL-MCNC: 1.01 MG/DL (ref 0.57–1)
CREAT UR-MCNC: 77.6 MG/DL
EST. AVERAGE GLUCOSE BLD GHB EST-MCNC: 143 MG/DL
GLUCOSE SERPL-MCNC: 97 MG/DL (ref 65–99)
HBA1C MFR BLD: 6.6 % (ref 4.8–5.6)
HDLC SERPL-MCNC: 38 MG/DL
LDLC SERPL CALC-MCNC: 54 MG/DL (ref 0–99)
MICROALBUMIN UR-MCNC: 5.8 UG/ML
POTASSIUM SERPL-SCNC: 4.3 MMOL/L (ref 3.5–5.2)
SL AMB EGFR AFRICAN AMERICAN: 69 ML/MIN/1.73
SL AMB EGFR NON AFRICAN AMERICAN: 60 ML/MIN/1.73
SL AMB VLDL CHOLESTEROL CALC: 13 MG/DL (ref 5–40)
SODIUM SERPL-SCNC: 140 MMOL/L (ref 134–144)
TRIGL SERPL-MCNC: 57 MG/DL (ref 0–149)

## 2021-12-27 ENCOUNTER — TELEPHONE (OUTPATIENT)
Dept: ENDOCRINOLOGY | Facility: CLINIC | Age: 61
End: 2021-12-27

## 2022-03-14 LAB
ALBUMIN SERPL-MCNC: 4.3 G/DL (ref 3.8–4.8)
ALBUMIN/GLOB SERPL: 1.7 {RATIO} (ref 1.2–2.2)
ALP SERPL-CCNC: 122 IU/L (ref 44–121)
ALT SERPL-CCNC: 19 IU/L (ref 0–32)
AST SERPL-CCNC: 26 IU/L (ref 0–40)
BILIRUB SERPL-MCNC: <0.2 MG/DL (ref 0–1.2)
BUN SERPL-MCNC: 24 MG/DL (ref 8–27)
BUN/CREAT SERPL: 25 (ref 12–28)
CALCIUM SERPL-MCNC: 9.3 MG/DL (ref 8.7–10.3)
CHLORIDE SERPL-SCNC: 109 MMOL/L (ref 96–106)
CHOLEST SERPL-MCNC: 124 MG/DL (ref 100–199)
CO2 SERPL-SCNC: 20 MMOL/L (ref 20–29)
CREAT SERPL-MCNC: 0.95 MG/DL (ref 0.57–1)
EGFR: 68 ML/MIN/1.73
GLOBULIN SER-MCNC: 2.6 G/DL (ref 1.5–4.5)
GLUCOSE SERPL-MCNC: 95 MG/DL (ref 65–99)
HBA1C MFR BLD: 6.7 % (ref 4.8–5.6)
HDLC SERPL-MCNC: 39 MG/DL
LDLC SERPL CALC-MCNC: 73 MG/DL (ref 0–99)
POTASSIUM SERPL-SCNC: 4.3 MMOL/L (ref 3.5–5.2)
PROT SERPL-MCNC: 6.9 G/DL (ref 6–8.5)
SL AMB VLDL CHOLESTEROL CALC: 12 MG/DL (ref 5–40)
SODIUM SERPL-SCNC: 146 MMOL/L (ref 134–144)
TRIGL SERPL-MCNC: 56 MG/DL (ref 0–149)

## 2022-03-24 ENCOUNTER — OFFICE VISIT (OUTPATIENT)
Dept: ENDOCRINOLOGY | Facility: CLINIC | Age: 62
End: 2022-03-24
Payer: COMMERCIAL

## 2022-03-24 VITALS
WEIGHT: 154 LBS | HEIGHT: 65 IN | HEART RATE: 86 BPM | BODY MASS INDEX: 25.66 KG/M2 | DIASTOLIC BLOOD PRESSURE: 64 MMHG | SYSTOLIC BLOOD PRESSURE: 122 MMHG

## 2022-03-24 DIAGNOSIS — E11.9 TYPE 2 DIABETES MELLITUS TREATED WITH INSULIN (HCC): Primary | ICD-10-CM

## 2022-03-24 DIAGNOSIS — I50.22 CHRONIC SYSTOLIC CONGESTIVE HEART FAILURE (HCC): ICD-10-CM

## 2022-03-24 DIAGNOSIS — Z79.4 TYPE 2 DIABETES MELLITUS TREATED WITH INSULIN (HCC): Primary | ICD-10-CM

## 2022-03-24 DIAGNOSIS — E78.2 MIXED HYPERLIPIDEMIA: ICD-10-CM

## 2022-03-24 DIAGNOSIS — I10 ESSENTIAL HYPERTENSION: ICD-10-CM

## 2022-03-24 PROCEDURE — 99214 OFFICE O/P EST MOD 30 MIN: CPT | Performed by: INTERNAL MEDICINE

## 2022-03-24 NOTE — PROGRESS NOTES
Mikala Damon 58 y o  female MRN: [id number]    Encounter: [id number]      Assessment/Plan     1  Type 2 diabetes mellitus on long-term insulin therapy  2  Gastric side effects  Well Controlled Last A1c 6 7%    Recommend the following at this time  Given sample of Farxiga to try instead of combination with metformin (Xigduo)   If symptoms resolve, advised patient to let me know, will plan to medication separately  then increase to 10 mg orally daily, hold mealtime insulin patient is send blood sugar log for patient notices any low/tight blood sugars    3  Hyperlipidemia - improved  - continue statin therapy    4  Hypertension  Blood pressure at goal  - continue current medications including ACE-I/ ARB      CC: Diabetes    History of Present Illness     HPI:  Mikala Damon is a 58 y o  female presents for a follow-up visit regarding diabetes management  Also has     Last seen in 10/2021  Last Eye exam:  06/2021, no diabetic retinopathy    Current regimen:   Lantus 15 units subcutaneously at bedtime  Humalog 6 units with meals 3 times a day  Sliding scale insulin   Xigduo XR 5-1000 mg daily      Metformin XR - got gastric s/e but is tolerating xigduo     Weight stable  Feels well   Not walking/exercising as much as before subcutaneous  Still has some loose stools thinks it is from the metformin but not as bad     Statin:  Lipitor  ACE-I/ARB:  Losartan    Home glucose monitoring: will be scanned into chart, ranging  mg/dL   Symptoms of hypoglycemia :   Occasional low Bg in the 60s in the morning fasting,  Shaky and sweating    All other systems were reviewed and are negative  Review of Systems      Historical Information   Past Medical History:   Diagnosis Date    [facility]     [facility] ([facility]     Hypertension      History reviewed  No pertinent surgical history    Social History   Social History     Substance and Sexual Activity   Alcohol Use Yes    Comment: social     Social History     Substance and Sexual Activity   Drug Use Never     Social History     Tobacco Use   Smoking Status Former Smoker   Smokeless Tobacco Former User     Family History: History reviewed  No pertinent family history  Meds/Allergies   Current Outpatient Medications   Medication Sig Dispense Refill    Alcohol Swabs 70 % PADS May substitute brand based on insurance coverage  Check glucose TID  100 each 0    ALPRAZolam (XANAX) 0 5 mg tablet Take 0 5 mg by mouth daily as needed      amLODIPine (NORVASC) 5 mg tablet Take 1 tablet (5 mg total) by mouth daily 30 tablet 0    aspirin 81 MG tablet Take 81 mg by mouth daily      atorvastatin (LIPITOR) 80 mg tablet Take 1 tablet (80 mg total) by mouth every evening 30 tablet 0    furosemide (LASIX) 20 mg tablet Take 20 mg by mouth      glucose blood (Contour Next Test) test strip Use 1 each 3 (three) times a day Use as instructed      insulin glargine (Lantus SoloStar) 100 units/mL injection pen Inject 15 Units under the skin daily at bedtime 15 mL 3    insulin lispro (HumaLOG KwikPen) 100 units/mL injection pen Inject 6 Units under the skin 3 (three) times a day with meals 15 mL 3    Insulin Pen Needle (BD Pen Needle Megan 2nd Gen) 32G X 4 MM MISC For use with insulin pen  Pharmacy may dispense brand covered by insurance  100 each 0    Insulin Pen Needle (BD Pen Needle Megan 2nd Gen) 32G X 4 MM MISC For use with insulin pen  Pharmacy may dispense brand covered by insurance   100 each 0    losartan (COZAAR) 50 mg tablet Take 1 tablet (50 mg total) by mouth daily 30 tablet 0    metoprolol succinate (TOPROL-XL) 50 mg 24 hr tablet Take 1 tablet (50 mg total) by mouth daily 30 tablet 0    nitroglycerin (NITROSTAT) 0 4 mg SL tablet Place 0 4 mg under the tongue      Xigduo XR 5-1000 MG TB24       furosemide (LASIX) 40 mg tablet Take by mouth (Patient not taking: Reported on 10/13/2021)      glucose blood (FREESTYLE TEST STRIPS) test strip May substitute brand based on insurance coverage  Check glucose TID  (Patient not taking: Reported on 6/16/2021) 100 each 0    glucose monitoring kit (FREESTYLE) monitoring kit May substitute brand based on insurance coverage  Check glucose TID  (Patient not taking: Reported on 6/16/2021) 1 each 0    Lancets (freestyle) lancets May substitute brand based on insurance coverage  Check glucose TID  (Patient not taking: Reported on 6/16/2021) 100 each 0    metFORMIN (GLUCOPHAGE-XR) 500 mg 24 hr tablet TAKE 2 TABLETS BY MOUTH TWICE A DAY WITH FOOD (Patient not taking: Reported on 10/13/2021) 120 tablet 1     No current facility-administered medications for this visit  No Known Allergies    Objective   Vitals: Blood pressure 122/64, pulse 86, height 5' 5" (1 651 m), weight 69 9 kg (154 lb)  Physical Exam  Constitutional:       Appearance: She is well-developed  HENT:      Head: Normocephalic and atraumatic  Eyes:      Conjunctiva/sclera: Conjunctivae normal       Pupils: Pupils are equal, round, and reactive to light  Neck:      Thyroid: No thyromegaly  Cardiovascular:      Rate and Rhythm: Normal rate and regular rhythm  Pulses:           Dorsalis pedis pulses are 2+ on the right side and 2+ on the left side  Heart sounds: Normal heart sounds  No murmur heard  Pulmonary:      Effort: Pulmonary effort is normal       Breath sounds: Normal breath sounds  No wheezing  Abdominal:      General: There is no distension  Palpations: Abdomen is soft  Tenderness: There is no abdominal tenderness  Musculoskeletal:         General: Normal range of motion  Cervical back: Normal range of motion and neck supple  Feet:      Right foot:      Skin integrity: No ulcer, skin breakdown, erythema, warmth, callus or dry skin  Left foot:      Skin integrity: No ulcer, skin breakdown, erythema, warmth, callus or dry skin  Skin:     General: Skin is warm and dry     Neurological:      Mental Status: She is alert and oriented to person, place, and time  Psychiatric:         Behavior: Behavior normal        Diabetic Foot Exam    Patient's shoes and socks removed  Right Foot/Ankle   Right Foot Inspection  Skin Exam: skin normal and skin intact  No dry skin, no warmth, no callus, no erythema, no maceration, no abnormal color, no pre-ulcer, no ulcer and no callus  Sensory   Vibration: intact  Proprioception: intact  Monofilament testing: intact    Vascular  Capillary refills: < 3 seconds  The right DP pulse is 2+  Left Foot/Ankle  Left Foot Inspection  Skin Exam: skin normal and skin intact  No dry skin, no warmth, no erythema, no maceration, normal color, no pre-ulcer, no ulcer and no callus  Sensory   Vibration: intact  Proprioception: intact  Monofilament testing: intact    Vascular  Capillary refills: < 3 seconds  The left DP pulse is 2+  Assign Risk Category  Risk: 0  The history was obtained from the review of the chart, patient      Lab Results:   Lab Results   Component Value Date/Time    Hemoglobin A1C 6 7 (H) 03/11/2022 12:00 AM    Hemoglobin A1C 6 6 (H) 12/17/2021 12:00 AM    Hemoglobin A1C >14 0 (H) 05/26/2021 05:49 AM    WBC 8 12 05/26/2021 05:49 AM    WBC 7 83 05/25/2021 06:18 PM    Hemoglobin 14 0 05/26/2021 05:49 AM    Hemoglobin 16 1 (H) 05/25/2021 06:18 PM    Hematocrit 42 7 05/26/2021 05:49 AM    Hematocrit 47 3 (H) 05/25/2021 06:18 PM    MCV 90 05/26/2021 05:49 AM    MCV 88 05/25/2021 06:18 PM    Platelets 272 77/54/0458 05:49 AM    Platelets 286 40/21/9102 11:24 PM    Platelets 257 20/77/3378 06:18 PM    BUN 24 03/11/2022 12:00 AM    BUN 20 12/17/2021 12:00 AM    BUN 21 05/28/2021 04:48 AM    BUN 22 05/27/2021 05:42 AM    BUN 20 05/26/2021 05:49 AM    Potassium 4 3 03/11/2022 12:00 AM    Potassium 4 3 12/17/2021 12:00 AM    Potassium 3 7 05/28/2021 04:48 AM    Potassium 3 8 05/27/2021 05:42 AM    Potassium 4 5 05/26/2021 05:49 AM    Chloride 109 (H) 03/11/2022 12:00 AM    Chloride 105 12/17/2021 12:00 AM    Chloride 109 (H) 05/28/2021 04:48 AM    Chloride 106 05/27/2021 05:42 AM    Chloride 108 05/26/2021 05:49 AM    CO2 20 03/11/2022 12:00 AM    CO2 21 12/17/2021 12:00 AM    CO2 24 05/28/2021 04:48 AM    CO2 24 05/27/2021 05:42 AM    CO2 24 05/26/2021 05:49 AM    Creatinine 0 95 03/11/2022 12:00 AM    Creatinine 1 01 (H) 12/17/2021 12:00 AM    Creatinine 0 83 05/28/2021 04:48 AM    Creatinine 1 06 05/27/2021 05:42 AM    Creatinine 1 04 05/26/2021 05:49 AM    AST 26 03/11/2022 12:00 AM    AST 27 05/25/2021 06:18 PM    ALT 19 03/11/2022 12:00 AM    ALT 35 05/25/2021 06:18 PM    Albumin 4 3 03/11/2022 12:00 AM    Albumin 3 9 05/25/2021 06:18 PM    Globulin, Total 2 6 03/11/2022 12:00 AM    HDL 39 (L) 03/11/2022 12:00 AM    HDL 38 (L) 12/17/2021 12:00 AM    HDL, Direct 33 (L) 05/26/2021 05:49 AM    Triglycerides 56 03/11/2022 12:00 AM    Triglycerides 57 12/17/2021 12:00 AM    Triglycerides 249 (H) 05/26/2021 05:49 AM         Imaging Studies: I have personally reviewed pertinent reports  Portions of the record may have been created with voice recognition software  Occasional wrong word or "sound a like" substitutions may have occurred due to the inherent limitations of voice recognition software  Read the chart carefully and recognize, using context, where substitutions have occurred

## 2022-03-24 NOTE — PATIENT INSTRUCTIONS
Hold xigduo for few days, please use Farxiga 5 mg orally daily instead  andcontinue insulin at current dose   Please let me know if there is an improvement in gastric symptoms if yes, I will give you a prescription for Delroy Josekdestiny    Also try then plan to increase Farxiga to 10 mg orally daily, hold Humalog with meals, send log for review   Follow up in 3  months

## 2022-04-20 DIAGNOSIS — E11.649 UNCONTROLLED TYPE 2 DIABETES MELLITUS WITH HYPOGLYCEMIA WITHOUT COMA (HCC): ICD-10-CM

## 2022-04-20 RX ORDER — INSULIN GLARGINE 100 [IU]/ML
15 INJECTION, SOLUTION SUBCUTANEOUS
Qty: 15 ML | Refills: 3 | Status: SHIPPED | OUTPATIENT
Start: 2022-04-20

## 2022-06-27 LAB
BUN SERPL-MCNC: 20 MG/DL (ref 8–27)
BUN/CREAT SERPL: 23 (ref 12–28)
CALCIUM SERPL-MCNC: 9.2 MG/DL (ref 8.7–10.3)
CHLORIDE SERPL-SCNC: 105 MMOL/L (ref 96–106)
CO2 SERPL-SCNC: 21 MMOL/L (ref 20–29)
CREAT SERPL-MCNC: 0.86 MG/DL (ref 0.57–1)
EGFR: 76 ML/MIN/1.73
EST. AVERAGE GLUCOSE BLD GHB EST-MCNC: 154 MG/DL
GLUCOSE SERPL-MCNC: 115 MG/DL (ref 65–99)
HBA1C MFR BLD: 7 % (ref 4.8–5.6)
POTASSIUM SERPL-SCNC: 4.5 MMOL/L (ref 3.5–5.2)
SODIUM SERPL-SCNC: 143 MMOL/L (ref 134–144)

## 2022-08-30 ENCOUNTER — OFFICE VISIT (OUTPATIENT)
Dept: ENDOCRINOLOGY | Facility: CLINIC | Age: 62
End: 2022-08-30
Payer: COMMERCIAL

## 2022-08-30 VITALS
WEIGHT: 160 LBS | HEART RATE: 71 BPM | DIASTOLIC BLOOD PRESSURE: 80 MMHG | HEIGHT: 65 IN | SYSTOLIC BLOOD PRESSURE: 134 MMHG | BODY MASS INDEX: 26.66 KG/M2

## 2022-08-30 DIAGNOSIS — E66.3 OVERWEIGHT (BMI 25.0-29.9): ICD-10-CM

## 2022-08-30 DIAGNOSIS — I10 ESSENTIAL HYPERTENSION: ICD-10-CM

## 2022-08-30 DIAGNOSIS — I50.22 CHRONIC SYSTOLIC CONGESTIVE HEART FAILURE (HCC): ICD-10-CM

## 2022-08-30 DIAGNOSIS — Z79.4 TYPE 2 DIABETES MELLITUS TREATED WITH INSULIN (HCC): Primary | ICD-10-CM

## 2022-08-30 DIAGNOSIS — E78.2 MIXED HYPERLIPIDEMIA: ICD-10-CM

## 2022-08-30 DIAGNOSIS — E11.9 TYPE 2 DIABETES MELLITUS TREATED WITH INSULIN (HCC): Primary | ICD-10-CM

## 2022-08-30 PROCEDURE — 3075F SYST BP GE 130 - 139MM HG: CPT | Performed by: INTERNAL MEDICINE

## 2022-08-30 PROCEDURE — 99214 OFFICE O/P EST MOD 30 MIN: CPT | Performed by: INTERNAL MEDICINE

## 2022-08-30 PROCEDURE — 3079F DIAST BP 80-89 MM HG: CPT | Performed by: INTERNAL MEDICINE

## 2022-08-30 NOTE — PATIENT INSTRUCTIONS
Current regimen   Please check blood sugars more often before meals and at bedtime   Try to include some physical activity into her daily routine  Follow-up in 3 months with repeat labs

## 2022-08-30 NOTE — PROGRESS NOTES
Erum Urias 58 y o  female MRN: [id number]    Encounter: [id number]      Assessment/Plan     1  Type 2 diabetes mellitus on Ling term insulin therapy   2  overweight   Fairly well controlled based on Last A1c 7%, trended up slightly     Recommend the following at this time  Continue current regimen   Advised to staggered check blood sugars before meals and at bedtime, should send log for review if patient notices any persistent high or low blood sugars  -follow-up in 3 months, repeat labs ordered  If patient has any more UTI/vaginal infections, will need to discontinue S GLT 2 inhibitor   Advised patient she can hold Xigduo for few days to see if gastric symptoms improve    3  Hyperlipidemia  - continue statin therapy  Check fasting lipid panel    4  Hypertension  5  Heart failure   Blood pressure at goal   - continue current medications including ACE-I/ ARB      CC: Diabetes    History of Present Illness     HPI:  Erum Urias is a 58 y o  female presents for a follow-up visit regarding diabetes management  Last seen in 3/2022  Last Eye exam:  6/201- NO DR     Current regimen:   lantus 15 units at bedtime   Humalog 6 ujts 3 times a day   Xigduo 5-1000 mg daily     Says that she got a UTI after starting plain [place] instead of xigduo - was treated for it and went back to using   some stools and some bloating   Occasional numbness in the hands - thinks it is carpal tunnel   gianed a few lbs   Has not been walking recently   Might be consuming more carbs     Statin:  lipitor   ACE-I/ARB:  losaratan     Home glucose monitoring: has not been checking recently   Symptoms of hypoglycemia :  Not often ; has some sweating and lightheadedness, corrected without checking BG     All other systems were reviewed and are negative  Review of Systems      Historical Information   Past Medical History:   Diagnosis Date    Diabetes mellitus ([facility] [address] )     Heart failure ([facility] [address] )     Hypertension      History reviewed   No pertinent surgical history  Social History   Social History     Substance and Sexual Activity   Alcohol Use Yes    Comment: social     Social History     Substance and Sexual Activity   Drug Use Never     Social History     Tobacco Use   Smoking Status Former Smoker   Smokeless Tobacco Former User     Family History: History reviewed  No pertinent family history  Meds/Allergies   Current Outpatient Medications   Medication Sig Dispense Refill    Alcohol Swabs 70 % PADS May substitute brand based on insurance coverage  Check glucose TID  100 each 0    ALPRAZolam (XANAX) 0 5 mg tablet Take 0 5 mg by mouth daily as needed      amLODIPine (NORVASC) 5 mg tablet Take 1 tablet (5 mg total) by mouth daily 30 tablet 0    aspirin 81 MG tablet Take 81 mg by mouth daily      atorvastatin (LIPITOR) 80 mg tablet Take 1 tablet (80 mg total) by mouth every evening 30 tablet 0    furosemide (LASIX) 20 mg tablet Take 20 mg by mouth daily      glucose blood (Contour Next Test) test strip Use 1 each 3 (three) times a day Use as instructed      insulin lispro (HumaLOG KwikPen) 100 units/mL injection pen Inject 6 Units under the skin 3 (three) times a day with meals 15 mL 3    Insulin Pen Needle (BD Pen Needle Megan 2nd Gen) 32G X 4 MM MISC For use with insulin pen  Pharmacy may dispense brand covered by insurance   100 each 0    Lantus SoloStar 100 units/mL injection pen INJECT 15 UNITS UNDER THE SKIN DAILY AT BEDTIME 15 mL 3    losartan (COZAAR) 50 mg tablet Take 1 tablet (50 mg total) by mouth daily (Patient taking differently: Take 100 mg by mouth daily) 30 tablet 0    nitroglycerin (NITROSTAT) 0 4 mg SL tablet Place 0 4 mg under the tongue as needed      Xigduo XR 5-1000 MG TB24 Take by mouth in the morning      Dapagliflozin Propanediol (Farxiga) 5 MG TABS Take one tablet by mouth daily (Patient not taking: Reported on 8/30/2022) 21 tablet 0    furosemide (LASIX) 40 mg tablet Take by mouth (Patient not taking: No sig reported)      glucose blood (FREESTYLE TEST STRIPS) test strip May substitute brand based on insurance coverage  Check glucose TID  (Patient not taking: No sig reported) 100 each 0    glucose monitoring kit (FREESTYLE) monitoring kit May substitute brand based on insurance coverage  Check glucose TID  (Patient not taking: No sig reported) 1 each 0    Insulin Pen Needle (BD Pen Needle Megan 2nd Gen) 32G X 4 MM MISC For use with insulin pen  Pharmacy may dispense brand covered by insurance  100 each 0    Lancets (freestyle) lancets May substitute brand based on insurance coverage  Check glucose TID  (Patient not taking: No sig reported) 100 each 0    metFORMIN (GLUCOPHAGE-XR) 500 mg 24 hr tablet TAKE 2 TABLETS BY MOUTH TWICE A DAY WITH FOOD (Patient not taking: No sig reported) 120 tablet 1    metoprolol succinate (TOPROL-XL) 50 mg 24 hr tablet Take 1 tablet (50 mg total) by mouth daily (Patient not taking: Reported on 8/30/2022) 30 tablet 0     No current facility-administered medications for this visit  No Known Allergies    Objective   Vitals: Blood pressure 134/80, pulse 71, height 5' 5" (1 651 m), weight 72 6 kg (160 lb)  Physical Exam  Constitutional:       Appearance: She is well-developed  HENT:      Head: Normocephalic and atraumatic  Eyes:      Conjunctiva/sclera: Conjunctivae normal       Pupils: Pupils are equal, round, and reactive to light  Neck:      Thyroid: No thyromegaly  Cardiovascular:      Rate and Rhythm: Normal rate and regular rhythm  Heart sounds: Normal heart sounds  No murmur heard  Pulmonary:      Effort: Pulmonary effort is normal       Breath sounds: Normal breath sounds  No wheezing  Abdominal:      General: There is no distension  Palpations: Abdomen is soft  Tenderness: There is no abdominal tenderness  Musculoskeletal:         General: Normal range of motion  Cervical back: Normal range of motion and neck supple        Right lower leg: No edema  Left lower leg: No edema  Skin:     General: Skin is warm and dry  Neurological:      Mental Status: She is alert and oriented to person, place, and time  Psychiatric:         Behavior: Behavior normal          The history was obtained from the review of the chart, patient  Lab Results:   Lab Results   Component Value Date/Time    Hemoglobin A1C 7 0 (H) 06/24/2022 12:00 AM    Hemoglobin A1C 6 7 (H) 03/11/2022 12:00 AM    Hemoglobin A1C 6 6 (H) 12/17/2021 12:00 AM    BUN 20 06/24/2022 12:00 AM    BUN 24 03/11/2022 12:00 AM    BUN 20 12/17/2021 12:00 AM    Potassium 4 5 06/24/2022 12:00 AM    Potassium 4 3 03/11/2022 12:00 AM    Potassium 4 3 12/17/2021 12:00 AM    Chloride 105 06/24/2022 12:00 AM    Chloride 109 (H) 03/11/2022 12:00 AM    Chloride 105 12/17/2021 12:00 AM    CO2 21 06/24/2022 12:00 AM    CO2 20 03/11/2022 12:00 AM    CO2 21 12/17/2021 12:00 AM    Creatinine 0 86 06/24/2022 12:00 AM    Creatinine 0 95 03/11/2022 12:00 AM    Creatinine 1 01 (H) 12/17/2021 12:00 AM    AST 26 03/11/2022 12:00 AM    ALT 19 03/11/2022 12:00 AM    Albumin 4 3 03/11/2022 12:00 AM    Globulin, Total 2 6 03/11/2022 12:00 AM    HDL 39 (L) 03/11/2022 12:00 AM    HDL 38 (L) 12/17/2021 12:00 AM    Triglycerides 56 03/11/2022 12:00 AM    Triglycerides 57 12/17/2021 12:00 AM         Imaging Studies: I have personally reviewed pertinent reports  Portions of the record may have been created with voice recognition software  Occasional wrong word or "sound a like" substitutions may have occurred due to the inherent limitations of voice recognition software  Read the chart carefully and recognize, using context, where substitutions have occurred

## 2022-09-02 DIAGNOSIS — E11.65 UNCONTROLLED TYPE 2 DIABETES MELLITUS WITH HYPERGLYCEMIA (HCC): ICD-10-CM

## 2022-09-02 RX ORDER — LANCETS
EACH MISCELLANEOUS
Qty: 100 EACH | Refills: 10 | Status: SHIPPED | OUTPATIENT
Start: 2022-09-02

## 2022-09-23 ENCOUNTER — RA CDI HCC (OUTPATIENT)
Dept: OTHER | Facility: HOSPITAL | Age: 62
End: 2022-09-23

## 2022-09-23 NOTE — PROGRESS NOTES
[facility]  coding opportunities          Chart Reviewed number of suggestions sent to Provider: 1   I11 0    Patients Insurance        Commercial Insurance: [employer]

## 2022-09-27 ENCOUNTER — OFFICE VISIT (OUTPATIENT)
Dept: INTERNAL MEDICINE CLINIC | Facility: CLINIC | Age: 62
End: 2022-09-27
Payer: COMMERCIAL

## 2022-09-27 VITALS
RESPIRATION RATE: 15 BRPM | WEIGHT: 164 LBS | OXYGEN SATURATION: 97 % | DIASTOLIC BLOOD PRESSURE: 84 MMHG | HEIGHT: 64 IN | SYSTOLIC BLOOD PRESSURE: 160 MMHG | HEART RATE: 72 BPM | TEMPERATURE: 97.7 F | BODY MASS INDEX: 28 KG/M2

## 2022-09-27 DIAGNOSIS — I50.42 CHRONIC COMBINED SYSTOLIC AND DIASTOLIC CONGESTIVE HEART FAILURE (HCC): Primary | ICD-10-CM

## 2022-09-27 DIAGNOSIS — I47.29 NONSUSTAINED PAROXYSMAL VENTRICULAR TACHYCARDIA: ICD-10-CM

## 2022-09-27 DIAGNOSIS — E55.9 VITAMIN D DEFICIENCY: ICD-10-CM

## 2022-09-27 DIAGNOSIS — R79.89 ABNORMAL LFTS: ICD-10-CM

## 2022-09-27 DIAGNOSIS — D64.9 ANEMIA, UNSPECIFIED TYPE: ICD-10-CM

## 2022-09-27 DIAGNOSIS — E78.2 MIXED HYPERLIPIDEMIA: ICD-10-CM

## 2022-09-27 DIAGNOSIS — E11.65 UNCONTROLLED TYPE 2 DIABETES MELLITUS WITH HYPERGLYCEMIA (HCC): ICD-10-CM

## 2022-09-27 DIAGNOSIS — B33.24 VIRAL CARDIOMYOPATHY (HCC): ICD-10-CM

## 2022-09-27 DIAGNOSIS — E11.9 TYPE 2 DIABETES MELLITUS TREATED WITH INSULIN (HCC): ICD-10-CM

## 2022-09-27 DIAGNOSIS — Z79.4 TYPE 2 DIABETES MELLITUS TREATED WITH INSULIN (HCC): ICD-10-CM

## 2022-09-27 DIAGNOSIS — F41.9 ANXIETY: ICD-10-CM

## 2022-09-27 DIAGNOSIS — I10 ESSENTIAL HYPERTENSION: ICD-10-CM

## 2022-09-27 PROBLEM — I50.9 CHF (CONGESTIVE HEART FAILURE) (HCC): Status: RESOLVED | Noted: 2021-05-27 | Resolved: 2022-09-27

## 2022-09-27 PROCEDURE — 99214 OFFICE O/P EST MOD 30 MIN: CPT | Performed by: INTERNAL MEDICINE

## 2022-09-27 RX ORDER — ALPRAZOLAM 0.5 MG/1
0.5 TABLET ORAL DAILY PRN
Qty: 30 TABLET | Refills: 1 | Status: SHIPPED | OUTPATIENT
Start: 2022-09-27 | End: 2022-10-17 | Stop reason: SDUPTHER

## 2022-09-27 RX ORDER — ATORVASTATIN CALCIUM 80 MG/1
80 TABLET, FILM COATED ORAL EVERY EVENING
Qty: 30 TABLET | Refills: 5 | Status: SHIPPED | OUTPATIENT
Start: 2022-09-27

## 2022-09-27 RX ORDER — DAPAGLIFLOZIN AND METFORMIN HYDROCHLORIDE 5; 1000 MG/1; MG/1
1 TABLET, FILM COATED, EXTENDED RELEASE ORAL DAILY
Qty: 30 TABLET | Refills: 5 | Status: SHIPPED | OUTPATIENT
Start: 2022-09-27 | End: 2022-10-27

## 2022-09-27 NOTE — ASSESSMENT & PLAN NOTE
Multiple episodes asymptomatic nonsustained paroxysmal ventricular tachycardia evaluated by Cardiology continue metoprolol as patient is stable awaiting stress test echocardiogram

## 2022-09-27 NOTE — PATIENT INSTRUCTIONS
Hypertension( High Blood Pressure ):    Continue Home BP check daily and bring log, if you are not checking consider checking daily    Take your Blood Pressure medicine as advised    Do not take your Blood pressure medicine if systolic Blood Pressure (top number) is less than 100 or heart rate less than 60  Notify you physician  Diabetes    Check your fingerstick Accu-Chek once a day  Please check your fingerstick Accu-Chek different time of the day either at 7:00 a m  or 11:00 a m  for for p m 4 9:00 p m  Antoinette Zhang Follow Diabetic 1800 calorie diet for diabetes as advised  If you would sugar less than 80 or more than 300 to please call us on next visit is day  If the sugar is less than 80 follow-up hypoglycemia instructions as advised  Take your diabetic medicine as advised  Cholesterol    Eat low cholesterol diet    Continue taking your cholesterol medicine as advised    Call if any side effects    Lipid Profile and LFT prior to next visit or as advised  ( You should Get periodically to monitor liver side effects)    KNOW YOUR DIABETIC GOAL( HBA1C AND SUGAR), BLOOD PRESSURE TARGET NUMBER AND CHOLESTEROL( LDL, HDL AND TRIGLYCERIDE)   Hypertension( High Blood Pressure ):    Continue Home BP check daily and bring log, if you are not checking consider checking daily    Take your Blood Pressure medicine as advised    Do not take your Blood pressure medicine if systolic Blood Pressure (top number) is less than 100 or heart rate less than 60  Notify you physician  Diabetes    Check your fingerstick Accu-Chek once a day  Please check your fingerstick Accu-Chek different time of the day either at 7:00 a m  or 11:00 a m  for for p m 4 9:00 p m  Antoinette Zhang Follow Diabetic 1800 calorie diet for diabetes as advised  If you would sugar less than 80 or more than 300 to please call us on next visit is day  If the sugar is less than 80 follow-up hypoglycemia instructions as advised      Take your diabetic medicine as advised  Cholesterol    Eat low cholesterol diet    Continue taking your cholesterol medicine as advised    Call if any side effects    Lipid Profile and LFT prior to next visit or as advised  ( You should Get periodically to monitor liver side effects)    KNOW YOUR DIABETIC GOAL( HBA1C AND SUGAR), BLOOD PRESSURE TARGET NUMBER AND CHOLESTEROL( LDL, HDL AND TRIGLYCERIDE)

## 2022-09-27 NOTE — ASSESSMENT & PLAN NOTE
Wt Readings from Last 3 Encounters:   09/27/22 74 4 kg (164 lb)   08/30/22 72 6 kg (160 lb)   03/24/22 69 9 kg (154 lb)       Patient evaluated by is a Cardiology last week continue metoprolol amlodipine Lasix fluid restriction daily weight monitoring no evidence any CHF at present time continue current treatment awaiting stress test and echocardiogram as requested by Cardiology

## 2022-09-27 NOTE — ASSESSMENT & PLAN NOTE
Lab Results   Component Value Date    HGBA1C 7 0 (H) 06/24/2022   Treated with Lantus Humalog and Xigduo control last A1c 7 with diet exercise patient followed continue diet exercise patient evaluated by endocrinologist the endocrinologist follow-up note reviewed somehow lipid profile and CMP not done requested repeat labs lipid profile CMP

## 2022-09-27 NOTE — PROGRESS NOTES
Dr Kiana Phelps Office Visit Note  22     Mauri Love 58 y o  female MRN: [id number]  : 1960    Assessment:     1  Chronic combined systolic and diastolic congestive heart failure (HCC)  Assessment & Plan:  Wt Readings from Last 3 Encounters:   22 74 4 kg (164 lb)   22 72 6 kg (160 lb)   22 69 9 kg (154 lb)       Patient evaluated by is a Cardiology last week continue metoprolol amlodipine Lasix fluid restriction daily weight monitoring no evidence any CHF at present time continue current treatment awaiting stress test and echocardiogram as requested by Cardiology        2  Uncontrolled type 2 diabetes mellitus with hyperglycemia (HCC)  -     atorvastatin (LIPITOR) 80 mg tablet; Take 1 tablet (80 mg total) by mouth every evening  -     Xigduo XR 5-1000 MG TB24; Take 1 tablet by mouth in the morning  -     Hemoglobin A1C; Future  -     Microalbumin / creatinine urine ratio    3  Mixed hyperlipidemia  -     Lipid panel; Future    4  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[facility])  Assessment & Plan:  Patient was seen by Cardiology awaiting stress echo      5  Nonsustained paroxysmal ventricular tachycardia (HCC)  Assessment & Plan:  Multiple episodes asymptomatic nonsustained paroxysmal ventricular tachycardia evaluated by Cardiology continue metoprolol as patient is stable awaiting stress test echocardiogram      6  Essential hypertension  Assessment & Plan:  Blood pressure control with amlodipine and metoprolol continue current treatment requested BMP      7  Abnormal LFTs  -     Comprehensive metabolic panel; Future    8  Anemia, unspecified type  -     CBC and differential; Future    9  Vitamin D deficiency  -     Vitamin D Panel; Future    10  Anxiety  -     ALPRAZolam (XANAX) 0 5 mg tablet; Take 1 tablet (0 5 mg total) by mouth daily as needed for anxiety        Discussion Summary and Plan:   Today's care plan and medications were reviewed with patient in detail and all their questions answered to their satisfaction  No chief complaint on file  Subjective:  Came in follow-up for chronic medical condition for now no difficulty breathing no other new symptoms no other new medical problems anxiety intermittent with panic attacks for a prescription for Xanax      The following portions of the patient's history were reviewed and updated as appropriate: allergies, current medications, past family history, past medical history, past social history, past surgical history and problem list   BMI Counseling: Body mass index is 28 15 kg/m²  The BMI is above normal  Nutrition recommendations include decreasing portion sizes, encouraging healthy choices of fruits and vegetables, decreasing fast food intake, consuming healthier snacks, limiting drinks that contain sugar, moderation in carbohydrate intake, increasing intake of lean protein, reducing intake of saturated and trans fat and reducing intake of cholesterol  Exercise recommendations include exercising 3-5 times per week  No pharmacotherapy was ordered  Rationale for BMI follow-up plan is due to patient being overweight or obese  PHQ-2/9 Depression Screening    Little interest or pleasure in doing things: 0 - not at all  Feeling down, depressed, or hopeless: 0 - not at all  PHQ-2 Score: 0  PHQ-2 Interpretation: Negative depression screen     Diabetic Foot Exam    Patient's shoes and socks removed  Right Foot/Ankle   Right Foot Inspection  Skin Exam: skin normal and skin intact  No dry skin, no warmth, no callus, no erythema, no maceration, no abnormal color, no pre-ulcer, no ulcer and no callus  Toe Exam: ROM and strength within normal limits  no right toe deformity    Sensory   Proprioception: intact  Monofilament testing: intact    Vascular  Capillary refills: < 3 seconds  The right DP pulse is 2+  The right PT pulse is 1+  Left Foot/Ankle  Left Foot Inspection  Skin Exam: skin normal and skin intact   No dry skin, no warmth, no erythema, no maceration, normal color, no pre-ulcer, no ulcer and no callus  Toe Exam: ROM and strength within normal limits  No left toe deformity  Sensory   Proprioception: intact  Monofilament testing: intact    Vascular  Capillary refills: < 3 seconds  The left DP pulse is 2+  The left PT pulse is 1+  Assign Risk Category  No deformity present  No loss of protective sensation  Weak pulses  Risk: 0    Review of Systems   Constitutional: Positive for fatigue  Negative for activity change, appetite change, chills, diaphoresis, fever and unexpected weight change  HENT: Negative for congestion, dental problem, drooling, ear discharge, ear pain, facial swelling, hearing loss, mouth sores, nosebleeds, postnasal drip, rhinorrhea, sinus pressure, sneezing, sore throat, tinnitus, trouble swallowing and voice change  Eyes: Negative for photophobia, pain, discharge, redness, itching and visual disturbance  Respiratory: Positive for shortness of breath  Negative for apnea, cough, choking, chest tightness, wheezing and stridor  Cardiovascular: Negative for chest pain, palpitations and leg swelling  Gastrointestinal: Negative for abdominal distention, abdominal pain, anal bleeding, blood in stool, constipation, diarrhea, nausea, rectal pain and vomiting  Endocrine: Negative for cold intolerance, heat intolerance, polydipsia, polyphagia and polyuria  Genitourinary: Negative for decreased urine volume, difficulty urinating, dysuria, enuresis, flank pain, frequency, genital sores, hematuria and urgency  Musculoskeletal: Positive for arthralgias  Negative for back pain, gait problem, joint swelling, myalgias, neck pain and neck stiffness  Skin: Negative for color change, pallor, rash and wound  Allergic/Immunologic: Negative  Negative for environmental allergies, food allergies and immunocompromised state     Neurological: Negative for dizziness, tremors, seizures, syncope, facial asymmetry, speech difficulty, weakness, light-headedness, numbness and headaches  Psychiatric/Behavioral: Negative for agitation, behavioral problems, confusion, decreased concentration, dysphoric mood, hallucinations, self-injury, sleep disturbance and suicidal ideas  The patient is not nervous/anxious and is not hyperactive  Historical Information   Patient Active Problem List   Diagnosis    Acquired deformity of foot    Pain in both feet    Congenital pes planus of right foot    Congenital pes planus of left foot    Hammer toe of left foot    Harvey's neuroma of second interspace of right foot    Right foot pain    Hammer toe of right foot    Word finding difficulty    Hypertensive urgency    Hyponatremia in the setting of hyperglycemia    Prolonged QT interval    HLD (hyperlipidemia)    Nonsustained paroxysmal ventricular tachycardia (HCC)    Essential hypertension    Blurry vision    Diarrhea    [facility]    Overweight (BMI 25 0-29  9)    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    Chronic combined systolic and diastolic congestive heart failure (HCC)     Past Medical History:   Diagnosis Date    Abdominal pain     Anxiety disorder     Diabetes mellitus (HCC)     Dizziness     Heart failure (HCC)     High cholesterol     Hypertension     Lightheadedness     Palpitations     SOB (shortness of breath)      History reviewed  No pertinent surgical history  Social History     Substance and Sexual Activity   Alcohol Use Yes    Comment: social     Social History     Substance and Sexual Activity   Drug Use Never     Social History     Tobacco Use   Smoking Status Former Smoker   Smokeless Tobacco Former User     History reviewed  No pertinent family history    Health Maintenance Due   Topic    Hepatitis C Screening     COVID-19 Vaccine (1)    Pneumococcal Vaccine: Pediatrics (0 to 5 Years) and At-Risk Patients (6 to 59 Years) (1 - PCV)    Diabetic Foot Exam     DM Eye Exam  Depression Screening     HIV Screening     BMI: Followup Plan     Annual Physical     DTaP,Tdap,and Td Vaccines (1 - Tdap)    Cervical Cancer Screening     Breast Cancer Screening: Mammogram     Colorectal Cancer Screening     Influenza Vaccine (1)    HEMOGLOBIN A1C       Meds/Allergies       Current Outpatient Medications:     Alcohol Swabs 70 % PADS, May substitute brand based on insurance coverage  Check glucose TID , Disp: 100 each, Rfl: 0    ALPRAZolam (XANAX) 0 5 mg tablet, Take 1 tablet (0 5 mg total) by mouth daily as needed for anxiety, Disp: 30 tablet, Rfl: 1    amLODIPine (NORVASC) 5 mg tablet, Take 1 tablet (5 mg total) by mouth daily, Disp: 30 tablet, Rfl: 0    aspirin 81 MG tablet, Take 81 mg by mouth daily, Disp: , Rfl:     atorvastatin (LIPITOR) 80 mg tablet, Take 1 tablet (80 mg total) by mouth every evening, Disp: 30 tablet, Rfl: 5    furosemide (LASIX) 40 mg tablet, Take by mouth, Disp: , Rfl:     glucose blood (Contour Next Test) test strip, Use 1 each 3 (three) times a day Use as instructed, Disp: , Rfl:     glucose blood (FREESTYLE TEST STRIPS) test strip, May substitute brand based on insurance coverage  Check glucose TID , Disp: 100 each, Rfl: 0    glucose monitoring kit (FREESTYLE) monitoring kit, May substitute brand based on insurance coverage  Check glucose TID , Disp: 1 each, Rfl: 0    insulin lispro (HumaLOG KwikPen) 100 units/mL injection pen, Inject 6 Units under the skin 3 (three) times a day with meals, Disp: 15 mL, Rfl: 3    Insulin Pen Needle (BD Pen Needle Megan 2nd Gen) 32G X 4 MM MISC, For use with insulin pen  Pharmacy may dispense brand covered by insurance , Disp: 100 each, Rfl: 0    Insulin Pen Needle (BD Pen Needle Megan 2nd Gen) 32G X 4 MM MISC, For use with insulin pen   Pharmacy may dispense brand covered by insurance , Disp: 100 each, Rfl: 0    Lantus SoloStar 100 units/mL injection pen, INJECT 15 UNITS UNDER THE SKIN DAILY AT BEDTIME, Disp: 15 mL, Rfl: 3    metoprolol succinate (TOPROL-XL) 50 mg 24 hr tablet, Take 1 tablet (50 mg total) by mouth daily, Disp: 30 tablet, Rfl: 0    Microlet Lancets MISC, USE TO TEST 3 TIMES A DAY, Disp: 100 each, Rfl: 10    nitroglycerin (NITROSTAT) 0 4 mg SL tablet, Place 0 4 mg under the tongue as needed, Disp: , Rfl:     Xigduo XR 5-1000 MG TB24, Take 1 tablet by mouth in the morning, Disp: 30 tablet, Rfl: 5      Objective:    Vitals:   /84   Pulse 72   Temp 97 7 °F (36 5 °C)   Resp 15   Ht 5' 4" (1 626 m)   Wt 74 4 kg (164 lb)   SpO2 97%   BMI 28 15 kg/m²   Body mass index is 28 15 kg/m²  Vitals:    09/27/22 1620   Weight: 74 4 kg (164 lb)       Physical Exam  Constitutional:       General: She is not in acute distress  Appearance: She is well-developed  She is not ill-appearing, toxic-appearing or diaphoretic  HENT:      Head: Normocephalic and atraumatic  Right Ear: External ear normal       Left Ear: External ear normal       Nose: Nose normal       Mouth/Throat:      Pharynx: No oropharyngeal exudate  Eyes:      General: Lids are normal  Lids are everted, no foreign bodies appreciated  No scleral icterus  Right eye: No discharge  Left eye: No discharge  Conjunctiva/sclera: Conjunctivae normal       Pupils: Pupils are equal, round, and reactive to light  Neck:      Thyroid: No thyromegaly  Vascular: Normal carotid pulses  No carotid bruit, hepatojugular reflux or JVD  Trachea: No tracheal tenderness or tracheal deviation  Cardiovascular:      Rate and Rhythm: Regular rhythm  Tachycardia present  Pulses: Pulses are weak  Dorsalis pedis pulses are 2+ on the right side and 2+ on the left side  Posterior tibial pulses are 1+ on the right side and 1+ on the left side  Heart sounds: Normal heart sounds  No murmur heard  No friction rub  No gallop  Pulmonary:      Effort: Pulmonary effort is normal  No respiratory distress  Breath sounds: Normal breath sounds  No stridor  No wheezing or rales  Chest:      Chest wall: No tenderness  Abdominal:      General: Bowel sounds are normal  There is no distension  Palpations: Abdomen is soft  There is no mass  Tenderness: There is no abdominal tenderness  There is no guarding or rebound  Musculoskeletal:         General: No tenderness or deformity  Normal range of motion  Cervical back: Normal range of motion and neck supple  No edema, erythema or rigidity  No spinous process tenderness or muscular tenderness  Normal range of motion  Right lower leg: Edema present  Left lower leg: Edema present  Feet:      Right foot:      Skin integrity: No ulcer, skin breakdown, erythema, warmth, callus or dry skin  Left foot:      Skin integrity: No ulcer, skin breakdown, erythema, warmth, callus or dry skin  Lymphadenopathy:      Head:      Right side of head: No submental, submandibular, tonsillar, preauricular or posterior auricular adenopathy  Left side of head: No submental, submandibular, tonsillar, preauricular, posterior auricular or occipital adenopathy  Cervical: No cervical adenopathy  Right cervical: No superficial, deep or posterior cervical adenopathy  Left cervical: No superficial, deep or posterior cervical adenopathy  Upper Body:      Right upper body: No pectoral adenopathy  Left upper body: No pectoral adenopathy  Skin:     General: Skin is warm and dry  Coloration: Skin is not pale  Findings: No erythema or rash  Neurological:      Mental Status: She is alert and oriented to person, place, and time  Cranial Nerves: No cranial nerve deficit  Sensory: No sensory deficit  Motor: No tremor, abnormal muscle tone or seizure activity  Coordination: Coordination normal       Gait: Gait normal       Deep Tendon Reflexes: Reflexes are normal and symmetric   Reflexes normal    Psychiatric: Behavior: Behavior normal          Thought Content: Thought content normal          Judgment: Judgment normal          Lab Review   No visits with results within 2 Month(s) from this visit  Latest known visit with results is:   Orders Only on 06/24/2022   Component Date Value Ref Range Status    Glucose, Random 06/24/2022 115 (A) 65 - 99 mg/dL Final    BUN 06/24/2022 20  8 - 27 mg/dL Final    Creatinine 06/24/2022 0 86  0 57 - 1 00 mg/dL Final    eGFR 06/24/2022 76  >59 mL/min/1 73 Final    SL AMB BUN/CREATININE RATIO 06/24/2022 23  12 - 28 Final    Sodium 06/24/2022 143  134 - 144 mmol/L Final    Potassium 06/24/2022 4 5  3 5 - 5 2 mmol/L Final    Chloride 06/24/2022 105  96 - 106 mmol/L Final    CO2 06/24/2022 21  20 - 29 mmol/L Final    CALCIUM 06/24/2022 9 2  8 7 - 10 3 mg/dL Final    Hemoglobin A1C 06/24/2022 7 0 (A) 4 8 - 5 6 % Final    Comment:          Prediabetes: 5 7 - 6 4           Diabetes: >6 4           Glycemic control for adults with diabetes: <7 0      Estimated Average Glucose 06/24/2022 154  mg/dL Final         Patient Instructions   Hypertension( High Blood Pressure ):    Continue Home BP check daily and bring log, if you are not checking consider checking daily    Take your Blood Pressure medicine as advised    Do not take your Blood pressure medicine if systolic Blood Pressure (top number) is less than 100 or heart rate less than 60  Notify you physician  Diabetes    Check your fingerstick Accu-Chek once a day  Please check your fingerstick Accu-Chek different time of the day either at 7:00 a m  or 11:00 a m  for for p m  4 9:00 p m  Kiley Mason Follow Diabetic 1800 calorie diet for diabetes as advised  If you would sugar less than 80 or more than 300 to please call us on next visit is day  If the sugar is less than 80 follow-up hypoglycemia instructions as advised  Take your diabetic medicine as advised      Cholesterol    Eat low cholesterol diet    Continue taking your cholesterol medicine as advised    Call if any side effects    Lipid Profile and LFT prior to next visit or as advised  ( You should Get periodically to monitor liver side effects)    KNOW YOUR DIABETIC GOAL( HBA1C AND SUGAR), BLOOD PRESSURE TARGET NUMBER AND CHOLESTEROL( LDL, HDL AND TRIGLYCERIDE)   Hypertension( High Blood Pressure ):    Continue Home BP check daily and bring log, if you are not checking consider checking daily    Take your Blood Pressure medicine as advised    Do not take your Blood pressure medicine if systolic Blood Pressure (top number) is less than 100 or heart rate less than 60  Notify you physician  Diabetes    Check your fingerstick Accu-Chek once a day  Please check your fingerstick Accu-Chek different time of the day either at 7:00 a m  or 11:00 a m  for for p m  4 9:00 p m  Dane Melton Follow Diabetic 1800 calorie diet for diabetes as advised  If you would sugar less than 80 or more than 300 to please call us on next visit is day  If the sugar is less than 80 follow-up hypoglycemia instructions as advised  Take your diabetic medicine as advised  Cholesterol    Eat low cholesterol diet    Continue taking your cholesterol medicine as advised    Call if any side effects    Lipid Profile and LFT prior to next visit or as advised  ( You should Get periodically to monitor liver side effects)    KNOW YOUR DIABETIC GOAL( HBA1C AND SUGAR), BLOOD PRESSURE TARGET NUMBER AND CHOLESTEROL( LDL, HDL AND TRIGLYCERIDE)   Judge Annelise MD        "This note has been constructed using a voice recognition system  Therefore there may be syntax, spelling, and/or grammatical errors   Please call if you have any questions  "

## 2022-10-03 DIAGNOSIS — I16.0 HYPERTENSIVE URGENCY: ICD-10-CM

## 2022-10-03 RX ORDER — METOPROLOL SUCCINATE 50 MG/1
TABLET, EXTENDED RELEASE ORAL
Qty: 30 TABLET | Refills: 5 | Status: SHIPPED | OUTPATIENT
Start: 2022-10-03 | End: 2022-10-17 | Stop reason: SDUPTHER

## 2022-10-17 DIAGNOSIS — F41.9 ANXIETY: ICD-10-CM

## 2022-10-17 DIAGNOSIS — I16.0 HYPERTENSIVE URGENCY: ICD-10-CM

## 2022-10-17 RX ORDER — METOPROLOL SUCCINATE 50 MG/1
50 TABLET, EXTENDED RELEASE ORAL DAILY
Qty: 30 TABLET | Refills: 5 | Status: SHIPPED | OUTPATIENT
Start: 2022-10-17

## 2022-10-17 RX ORDER — ALPRAZOLAM 0.5 MG/1
0.5 TABLET ORAL DAILY PRN
Qty: 30 TABLET | Refills: 0 | Status: SHIPPED | OUTPATIENT
Start: 2022-10-17 | End: 2022-11-29 | Stop reason: SDUPTHER

## 2022-11-29 DIAGNOSIS — F41.9 ANXIETY: ICD-10-CM

## 2022-11-29 RX ORDER — ALPRAZOLAM 0.5 MG/1
0.5 TABLET ORAL DAILY PRN
Qty: 30 TABLET | Refills: 0 | Status: SHIPPED | OUTPATIENT
Start: 2022-11-29 | End: 2022-12-07 | Stop reason: SDUPTHER

## 2022-12-06 LAB
BUN SERPL-MCNC: 19 MG/DL (ref 8–27)
BUN/CREAT SERPL: 20 (ref 12–28)
CALCIUM SERPL-MCNC: 9.4 MG/DL (ref 8.7–10.3)
CHLORIDE SERPL-SCNC: 110 MMOL/L (ref 96–106)
CHOLEST SERPL-MCNC: 159 MG/DL (ref 100–199)
CO2 SERPL-SCNC: 25 MMOL/L (ref 20–29)
CREAT SERPL-MCNC: 0.94 MG/DL (ref 0.57–1)
EGFR: 69 ML/MIN/1.73
EST. AVERAGE GLUCOSE BLD GHB EST-MCNC: 148 MG/DL
GLUCOSE SERPL-MCNC: 114 MG/DL (ref 70–99)
HBA1C MFR BLD: 6.8 % (ref 4.8–5.6)
HDLC SERPL-MCNC: 39 MG/DL
LDLC SERPL CALC-MCNC: 106 MG/DL (ref 0–99)
POTASSIUM SERPL-SCNC: 4.9 MMOL/L (ref 3.5–5.2)
SL AMB VLDL CHOLESTEROL CALC: 14 MG/DL (ref 5–40)
SODIUM SERPL-SCNC: 148 MMOL/L (ref 134–144)
TRIGL SERPL-MCNC: 72 MG/DL (ref 0–149)

## 2022-12-07 ENCOUNTER — OFFICE VISIT (OUTPATIENT)
Dept: INTERNAL MEDICINE CLINIC | Facility: CLINIC | Age: 62
End: 2022-12-07

## 2022-12-07 VITALS
HEIGHT: 64 IN | BODY MASS INDEX: 28 KG/M2 | WEIGHT: 164 LBS | DIASTOLIC BLOOD PRESSURE: 80 MMHG | OXYGEN SATURATION: 97 % | TEMPERATURE: 97.6 F | RESPIRATION RATE: 16 BRPM | SYSTOLIC BLOOD PRESSURE: 126 MMHG | HEART RATE: 86 BPM

## 2022-12-07 DIAGNOSIS — I10 ESSENTIAL HYPERTENSION: ICD-10-CM

## 2022-12-07 DIAGNOSIS — I20.1 PRINZMETAL'S ANGINA (HCC): Primary | ICD-10-CM

## 2022-12-07 DIAGNOSIS — I50.42 CHRONIC COMBINED SYSTOLIC AND DIASTOLIC CONGESTIVE HEART FAILURE (HCC): ICD-10-CM

## 2022-12-07 DIAGNOSIS — F41.9 ANXIETY: ICD-10-CM

## 2022-12-07 DIAGNOSIS — B33.24 VIRAL CARDIOMYOPATHY (HCC): ICD-10-CM

## 2022-12-07 DIAGNOSIS — Z12.31 SCREENING MAMMOGRAM, ENCOUNTER FOR: ICD-10-CM

## 2022-12-07 DIAGNOSIS — I16.0 HYPERTENSIVE URGENCY: ICD-10-CM

## 2022-12-07 DIAGNOSIS — I47.29 NONSUSTAINED PAROXYSMAL VENTRICULAR TACHYCARDIA: ICD-10-CM

## 2022-12-07 DIAGNOSIS — Z12.11 SCREENING FOR COLON CANCER: ICD-10-CM

## 2022-12-07 LAB
1,25(OH)2D3 SERPL-MCNC: 33.3 PG/ML (ref 24.8–81.5)
25(OH)D3+25(OH)D2 SERPL-MCNC: 24.3 NG/ML (ref 30–100)
ALBUMIN SERPL-MCNC: 4.7 G/DL (ref 3.8–4.8)
ALBUMIN/GLOB SERPL: 1.9 {RATIO} (ref 1.2–2.2)
ALP SERPL-CCNC: 130 IU/L (ref 44–121)
ALT SERPL-CCNC: 16 IU/L (ref 0–32)
AST SERPL-CCNC: 18 IU/L (ref 0–40)
BASOPHILS # BLD AUTO: 0 X10E3/UL (ref 0–0.2)
BASOPHILS NFR BLD AUTO: 0 %
BILIRUB SERPL-MCNC: 0.4 MG/DL (ref 0–1.2)
BUN SERPL-MCNC: 20 MG/DL (ref 8–27)
BUN/CREAT SERPL: 23 (ref 12–28)
CALCIUM SERPL-MCNC: 9.3 MG/DL (ref 8.7–10.3)
CHLORIDE SERPL-SCNC: 108 MMOL/L (ref 96–106)
CHOLEST SERPL-MCNC: 164 MG/DL (ref 100–199)
CO2 SERPL-SCNC: 23 MMOL/L (ref 20–29)
CREAT SERPL-MCNC: 0.86 MG/DL (ref 0.57–1)
EGFR: 76 ML/MIN/1.73
EOSINOPHIL # BLD AUTO: 0.1 X10E3/UL (ref 0–0.4)
EOSINOPHIL NFR BLD AUTO: 2 %
ERYTHROCYTE [DISTWIDTH] IN BLOOD BY AUTOMATED COUNT: 13.2 % (ref 11.7–15.4)
GLOBULIN SER-MCNC: 2.5 G/DL (ref 1.5–4.5)
GLUCOSE SERPL-MCNC: 113 MG/DL (ref 70–99)
HBA1C MFR BLD: 7.1 % (ref 4.8–5.6)
HCT VFR BLD AUTO: 43.3 % (ref 34–46.6)
HDLC SERPL-MCNC: 39 MG/DL
HGB BLD-MCNC: 14.8 G/DL (ref 11.1–15.9)
IMM GRANULOCYTES # BLD: 0 X10E3/UL (ref 0–0.1)
IMM GRANULOCYTES NFR BLD: 0 %
LDLC SERPL CALC-MCNC: 111 MG/DL (ref 0–99)
LYMPHOCYTES # BLD AUTO: 2 X10E3/UL (ref 0.7–3.1)
LYMPHOCYTES NFR BLD AUTO: 26 %
MCH RBC QN AUTO: 29.2 PG (ref 26.6–33)
MCHC RBC AUTO-ENTMCNC: 34.2 G/DL (ref 31.5–35.7)
MCV RBC AUTO: 86 FL (ref 79–97)
MONOCYTES # BLD AUTO: 0.4 X10E3/UL (ref 0.1–0.9)
MONOCYTES NFR BLD AUTO: 5 %
NEUTROPHILS # BLD AUTO: 5.3 X10E3/UL (ref 1.4–7)
NEUTROPHILS NFR BLD AUTO: 67 %
PLATELET # BLD AUTO: 214 X10E3/UL (ref 150–450)
POTASSIUM SERPL-SCNC: 4.7 MMOL/L (ref 3.5–5.2)
PROT SERPL-MCNC: 7.2 G/DL (ref 6–8.5)
RBC # BLD AUTO: 5.06 X10E6/UL (ref 3.77–5.28)
SL AMB VLDL CHOLESTEROL CALC: 14 MG/DL (ref 5–40)
SODIUM SERPL-SCNC: 147 MMOL/L (ref 134–144)
TRIGL SERPL-MCNC: 75 MG/DL (ref 0–149)
WBC # BLD AUTO: 7.9 X10E3/UL (ref 3.4–10.8)

## 2022-12-07 RX ORDER — ALPRAZOLAM 0.5 MG/1
0.5 TABLET ORAL DAILY PRN
Qty: 30 TABLET | Refills: 0 | Status: SHIPPED | OUTPATIENT
Start: 2022-12-07

## 2022-12-07 NOTE — PATIENT INSTRUCTIONS
Diabetes    Check your fingerstick Accu-Chek once a day  Please check your fingerstick Accu-Chek different time of the day either at 7:00 a m  or 11:00 a m  for for p m  4 9:00 p m  Rajeev Adamson Follow Diabetic diet for diabetes as advised  If you would sugar less than 80 or more than 300 to please call us on next visit is day  If the sugar is less than 80 follow-up hypoglycemia instructions as advised  Take your diabetic medicine as advised

## 2022-12-10 NOTE — PROGRESS NOTES
Dr Terrence Lee Office Visit Note  12/10/22     Erum December 58 y o  female MRN: [id number]  : 1960    Assessment:     1  Screening mammogram, encounter for  -     Mammo screening bilateral w 3d & cad; Future; Expected date: 2022    2  Anxiety  -     ALPRAZolam (XANAX) 0 5 mg tablet; Take 1 tablet (0 5 mg total) by mouth daily as needed for anxiety    3  Screening for colon cancer  -     Ambulatory referral for colonoscopy; Future    4  Prinzmetal's angina [facility])  Assessment & Plan:  Recently had nuclear stress test done about a week and half ago normal reviewed negative for ischemia continue metoprolol and nitroglycerin as needed symptoms controlled      5  Hypertensive urgency  Assessment & Plan:  Hypertensive urgency resolved now blood pressure controlled with amlodipine metoprolol low-salt diet      6  Nonsustained paroxysmal ventricular tachycardia  Assessment & Plan:  History of nonsustained ventricular tachycardia no evidence of any recurrence continue current therapy with metoprolol patient evaluated by cardiology had a nuclear stress test done negative for ischemia continue current treatment aspirin statin      7  Essential hypertension  Assessment & Plan:  Continue treatment with metoprolol amlodipine Lasix 20 low-salt diet blood pressure controlled      8  Chronic combined systolic and diastolic congestive heart failure (HCC)  Assessment & Plan:  Wt Readings from Last 3 Encounters:   22 74 4 kg (164 lb)   22 74 4 kg (164 lb)   22 72 6 kg (160 lb)       Patient evaluated by is a Cardiology last week continue metoprolol amlodipine Lasix fluid restriction daily weight monitoring no evidence any CHF at present time   CHF controlled evaluated by cardiology last echocardiogram ejection fraction 45 to 88% with diastolic dysfunction nuclear stress test was done negative for ischemia        9   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([facility] [address] )  Assessment & Plan:  No chest pain or difficulty breathing last echocardiogram ejection fraction 45 to 50% evaluated by cardiology recent stress test done nuclear was negative for ischemia continue metoprolol amlodipine and Lasix 20 mg with aspirin and statin          Discussion Summary and Plan: Today's care plan and medications were reviewed with patient in detail and all their questions answered to their satisfaction  Chief Complaint   Patient presents with   • Follow-up      Subjective:  Patient came in for follow-up of chronic medical condition mainly chronic diastolic systolic CHF which is controlled no difficulty breathing no leg leg edema Prinzmetal's angina history of chest pain relieved with nitroglycerin recently evaluated by cardiology nuclear stress test done negative for ischemia overall stable also follow-up for the diabetes advised dilated eye exam and advised home blood sugar monitoring with compliance with medication regimen      The following portions of the patient's history were reviewed and updated as appropriate: allergies, current medications, past family history, past medical history, past social history, past surgical history and problem list     Review of Systems   Constitutional: Positive for fatigue  Negative for activity change, appetite change, chills, diaphoresis, fever and unexpected weight change  HENT: Negative for congestion, dental problem, drooling, ear discharge, ear pain, facial swelling, hearing loss, mouth sores, nosebleeds, postnasal drip, rhinorrhea, sinus pressure, sneezing, sore throat, tinnitus, trouble swallowing and voice change  Eyes: Negative for photophobia, pain, discharge, redness, itching and visual disturbance  Respiratory: Positive for shortness of breath  Negative for apnea, cough, choking, chest tightness, wheezing and stridor  Cardiovascular: Positive for palpitations  Negative for chest pain and leg swelling     Gastrointestinal: Negative for abdominal distention, abdominal pain, anal bleeding, blood in stool, constipation, diarrhea, nausea, rectal pain and vomiting  Endocrine: Negative for cold intolerance, heat intolerance, polydipsia, polyphagia and polyuria  Genitourinary: Negative for decreased urine volume, difficulty urinating, dysuria, enuresis, flank pain, frequency, genital sores, hematuria and urgency  Musculoskeletal: Negative for arthralgias, back pain, gait problem, joint swelling, myalgias, neck pain and neck stiffness  Skin: Negative for color change, pallor, rash and wound  Allergic/Immunologic: Negative  Negative for environmental allergies, food allergies and immunocompromised state  Neurological: Negative for dizziness, tremors, seizures, syncope, facial asymmetry, speech difficulty, weakness, light-headedness, numbness and headaches  Psychiatric/Behavioral: Negative for agitation, behavioral problems, confusion, decreased concentration, dysphoric mood, hallucinations, self-injury, sleep disturbance and suicidal ideas  The patient is not nervous/anxious and is not hyperactive  Historical Information   Patient Active Problem List   Diagnosis   • Acquired deformity of foot   • Pain in both feet   • Congenital pes planus of right foot   • Congenital pes planus of left foot   • Hammer toe of left foot   • Harvey's neuroma of second interspace of right foot   • Right foot pain   • Hammer toe of right foot   • Word finding difficulty   • Hypertensive urgency   • Hyponatremia in the setting of hyperglycemia   • Prolonged QT interval   • HLD (hyperlipidemia)   • Nonsustained paroxysmal ventricular tachycardia   • Essential hypertension   • Blurry vision   • Diarrhea   • Type 2 diabetes mellitus treated with insulin ([facility] )   • Overweight (BMI 25 0-29  9)   • 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(HCC)   • Chronic combined systolic and diastolic congestive heart failure (HCC)   • Prinzmetal's angina [facility])     Past Medical History:   Diagnosis Date   • Abdominal pain    • Anxiety disorder    • Diabetes mellitus ([facility] [address] )    • Dizziness    • Heart failure (HCC)    • High cholesterol    • Hypertension    • Lightheadedness    • Palpitations    • SOB (shortness of breath)      History reviewed  No pertinent surgical history  Social History     Substance and Sexual Activity   Alcohol Use Yes    Comment: social     Social History     Substance and Sexual Activity   Drug Use Never     Social History     Tobacco Use   Smoking Status Former   Smokeless Tobacco Former     History reviewed  No pertinent family history  Health Maintenance Due   Topic   • Hepatitis C Screening    • Hepatitis B Vaccine (1 of 3 - 3-dose series)   • COVID-19 Vaccine (1)   • Pneumococcal Vaccine: Pediatrics (0 to 5 Years) and At-Risk Patients (6 to 59 Years) (1 - PCV)   • DM Eye Exam    • HIV Screening    • Annual Physical    • DTaP,Tdap,and Td Vaccines (1 - Tdap)   • Cervical Cancer Screening    • Breast Cancer Screening: Mammogram    • Colorectal Cancer Screening    • Influenza Vaccine (1)   • URINE MICROALBUMIN       Meds/Allergies       Current Outpatient Medications:   •  Alcohol Swabs 70 % PADS, May substitute brand based on insurance coverage  Check glucose TID , Disp: 100 each, Rfl: 0  •  ALPRAZolam (XANAX) 0 5 mg tablet, Take 1 tablet (0 5 mg total) by mouth daily as needed for anxiety, Disp: 30 tablet, Rfl: 0  •  amLODIPine (NORVASC) 5 mg tablet, Take 1 tablet (5 mg total) by mouth daily, Disp: 30 tablet, Rfl: 0  •  aspirin 81 MG tablet, Take 81 mg by mouth daily, Disp: , Rfl:   •  atorvastatin (LIPITOR) 80 mg tablet, Take 1 tablet (80 mg total) by mouth every evening, Disp: 30 tablet, Rfl: 5  •  furosemide (LASIX) 40 mg tablet, Take by mouth, Disp: , Rfl:   •  glucose blood (Contour Next Test) test strip, Use 1 each 3 (three) times a day Use as instructed, Disp: , Rfl:   •  glucose blood (FREESTYLE TEST STRIPS) test strip, May substitute brand based on insurance coverage   Check glucose TID , Disp: 100 each, Rfl: 0  •  glucose monitoring kit (FREESTYLE) monitoring kit, May substitute brand based on insurance coverage  Check glucose TID , Disp: 1 each, Rfl: 0  •  insulin lispro (HumaLOG KwikPen) 100 units/mL injection pen, Inject 6 Units under the skin 3 (three) times a day with meals, Disp: 15 mL, Rfl: 3  •  Insulin Pen Needle (BD Pen Needle Megan 2nd Gen) 32G X 4 MM MISC, For use with insulin pen  Pharmacy may dispense brand covered by insurance , Disp: 100 each, Rfl: 0  •  Insulin Pen Needle (BD Pen Needle Megan 2nd Gen) 32G X 4 MM MISC, For use with insulin pen  Pharmacy may dispense brand covered by insurance , Disp: 100 each, Rfl: 0  •  Lantus SoloStar 100 units/mL injection pen, INJECT 15 UNITS UNDER THE SKIN DAILY AT BEDTIME, Disp: 15 mL, Rfl: 3  •  metoprolol succinate (TOPROL-XL) 50 mg 24 hr tablet, Take 1 tablet (50 mg total) by mouth daily, Disp: 30 tablet, Rfl: 5  •  Microlet Lancets MISC, USE TO TEST 3 TIMES A DAY, Disp: 100 each, Rfl: 10  •  nitroglycerin (NITROSTAT) 0 4 mg SL tablet, Place 0 4 mg under the tongue as needed, Disp: , Rfl:   •  Xigduo XR 5-1000 MG TB24, Take 1 tablet by mouth in the morning, Disp: 30 tablet, Rfl: 5      Objective:    Vitals:   /80   Pulse 86   Temp 97 6 °F (36 4 °C)   Resp 16   Ht 5' 4" (1 626 m)   Wt 74 4 kg (164 lb)   SpO2 97%   BMI 28 15 kg/m²   Body mass index is 28 15 kg/m²  Vitals:    12/07/22 1632   Weight: 74 4 kg (164 lb)       Physical Exam  Constitutional:       General: She is not in acute distress  Appearance: She is well-developed  She is not ill-appearing, toxic-appearing or diaphoretic  HENT:      Head: Normocephalic and atraumatic  Right Ear: External ear normal       Left Ear: External ear normal       Nose: Nose normal       Mouth/Throat:      Pharynx: No oropharyngeal exudate  Eyes:      General: Lids are normal  Lids are everted, no foreign bodies appreciated  No scleral icterus  Right eye: No discharge  Left eye: No discharge  Conjunctiva/sclera: Conjunctivae normal       Pupils: Pupils are equal, round, and reactive to light  Neck:      Thyroid: No thyromegaly  Vascular: Normal carotid pulses  No carotid bruit, hepatojugular reflux or JVD  Trachea: No tracheal tenderness or tracheal deviation  Cardiovascular:      Rate and Rhythm: Normal rate and regular rhythm  Pulses: Normal pulses  Heart sounds: Normal heart sounds  No murmur heard  No friction rub  No gallop  Pulmonary:      Effort: Pulmonary effort is normal  No respiratory distress  Breath sounds: Normal breath sounds  No stridor  No wheezing or rales  Chest:      Chest wall: No tenderness  Abdominal:      General: Bowel sounds are normal  There is no distension  Palpations: Abdomen is soft  There is no mass  Tenderness: There is no abdominal tenderness  There is no guarding or rebound  Musculoskeletal:         General: No tenderness or deformity  Normal range of motion  Cervical back: Normal range of motion and neck supple  No edema, erythema or rigidity  No spinous process tenderness or muscular tenderness  Normal range of motion  Right lower leg: Edema present  Left lower leg: Edema present  Lymphadenopathy:      Head:      Right side of head: No submental, submandibular, tonsillar, preauricular or posterior auricular adenopathy  Left side of head: No submental, submandibular, tonsillar, preauricular, posterior auricular or occipital adenopathy  Cervical: No cervical adenopathy  Right cervical: No superficial, deep or posterior cervical adenopathy  Left cervical: No superficial, deep or posterior cervical adenopathy  Upper Body:      Right upper body: No pectoral adenopathy  Left upper body: No pectoral adenopathy  Skin:     General: Skin is warm and dry  Coloration: Skin is not pale  Findings: No erythema or rash     Neurological:      Mental Status: She is alert and oriented to person, place, and time  Cranial Nerves: No cranial nerve deficit  Sensory: No sensory deficit  Motor: No tremor, abnormal muscle tone or seizure activity  Coordination: Coordination normal       Gait: Gait normal       Deep Tendon Reflexes: Reflexes are normal and symmetric  Reflexes normal    Psychiatric:         Behavior: Behavior normal          Thought Content:  Thought content normal          Judgment: Judgment normal          Lab Review   Orders Only on 12/05/2022   Component Date Value Ref Range Status   • White Blood Cell Count 12/05/2022 7 9  3 4 - 10 8 x10E3/uL Final   • Red Blood Cell Count 12/05/2022 5 06  3 77 - 5 28 x10E6/uL Final   • Hemoglobin 12/05/2022 14 8  11 1 - 15 9 g/dL Final   • HCT 12/05/2022 43 3  34 0 - 46 6 % Final   • MCV 12/05/2022 86  79 - 97 fL Final   • MCH 12/05/2022 29 2  26 6 - 33 0 pg Final   • MCHC 12/05/2022 34 2  31 5 - 35 7 g/dL Final   • RDW 12/05/2022 13 2  11 7 - 15 4 % Final   • Platelet Count 05/26/6564 214  150 - 450 x10E3/uL Final   • Neutrophils 12/05/2022 67  Not Estab  % Final   • Lymphocytes 12/05/2022 26  Not Estab  % Final   • Monocytes 12/05/2022 5  Not Estab  % Final   • Eosinophils 12/05/2022 2  Not Estab  % Final   • Basophils PCT 12/05/2022 0  Not Estab  % Final   • Neutrophils (Absolute) 12/05/2022 5 3  1 4 - 7 0 x10E3/uL Final   • Lymphocytes (Absolute) 12/05/2022 2 0  0 7 - 3 1 x10E3/uL Final   • Monocytes (Absolute) 12/05/2022 0 4  0 1 - 0 9 x10E3/uL Final   • Eosinophils (Absolute) 12/05/2022 0 1  0 0 - 0 4 x10E3/uL Final   • Basophils ABS 12/05/2022 0 0  0 0 - 0 2 x10E3/uL Final   • Immature Granulocytes 12/05/2022 0  Not Estab  % Final   • Immature Granulocytes (Absolute) 12/05/2022 0 0  0 0 - 0 1 x10E3/uL Final   • Glucose, Random 12/05/2022 113 (H)  70 - 99 mg/dL Final   • BUN 12/05/2022 20  8 - 27 mg/dL Final   • Creatinine 12/05/2022 0 86  0 57 - 1 00 mg/dL Final   • eGFR 12/05/2022 76  >59 mL/min/1 73 Final   • SL AMB BUN/CREATININE RATIO 12/05/2022 23  12 - 28 Final   • Sodium 12/05/2022 147 (H)  134 - 144 mmol/L Final   • Potassium 12/05/2022 4 7  3 5 - 5 2 mmol/L Final   • Chloride 12/05/2022 108 (H)  96 - 106 mmol/L Final   • CO2 12/05/2022 23  20 - 29 mmol/L Final   • CALCIUM 12/05/2022 9 3  8 7 - 10 3 mg/dL Final   • Protein, Total 12/05/2022 7 2  6 0 - 8 5 g/dL Final   • Albumin 12/05/2022 4 7  3 8 - 4 8 g/dL Final   • Globulin, Total 12/05/2022 2 5  1 5 - 4 5 g/dL Final   • Albumin/Globulin Ratio 12/05/2022 1 9  1 2 - 2 2 Final   • TOTAL BILIRUBIN 12/05/2022 0 4  0 0 - 1 2 mg/dL Final   • Alk Phos Isoenzymes 12/05/2022 130 (H)  44 - 121 IU/L Final   • AST 12/05/2022 18  0 - 40 IU/L Final   • ALT 12/05/2022 16  0 - 32 IU/L Final   • Cholesterol, Total 12/05/2022 164  100 - 199 mg/dL Final   • Triglycerides 12/05/2022 75  0 - 149 mg/dL Final   • HDL 12/05/2022 39 (L)  >39 mg/dL Final   • VLDL Cholesterol Calculated 12/05/2022 14  5 - 40 mg/dL Final   • LDL Calculated 12/05/2022 111 (H)  0 - 99 mg/dL Final   • Hemoglobin A1C 12/05/2022 7 1 (H)  4 8 - 5 6 % Final    Comment:          Prediabetes: 5 7 - 6 4           Diabetes: >6 4           Glycemic control for adults with diabetes: <7 0     • Vit D, 1,25-Dihydroxy 12/05/2022 33 3  24 8 - 81 5 pg/mL Final   • 25-HYDROXY VIT D 12/05/2022 24 3 (L)  30 0 - 100 0 ng/mL Final    Comment: Vitamin D deficiency has been defined by the [address] practice guideline as a  level of serum 25-OH vitamin D less than 20 ng/mL (1,2)  The Endocrine Society went on to further define vitamin D  insufficiency as a level between 21 and 29 ng/mL (2)  1  IOM ([place] [employer])  2010  Dietary reference     intakes for calcium and D  [facility]: The     RUN    2  Dm RICKS, Power WALTERS, Marva GARCIA, et al      Evaluation, treatment, and prevention of vitamin D     deficiency: an Endocrine Society clinical practice guideline  JCEM  2011 Jul; 96(7):1911-30  Orders Only on 12/05/2022   Component Date Value Ref Range Status   • Glucose, Random 12/05/2022 114 (H)  70 - 99 mg/dL Final   • BUN 12/05/2022 19  8 - 27 mg/dL Final   • Creatinine 12/05/2022 0 94  0 57 - 1 00 mg/dL Final   • eGFR 12/05/2022 69  >59 mL/min/1 73 Final   • SL AMB BUN/CREATININE RATIO 12/05/2022 20  12 - 28 Final   • Sodium 12/05/2022 148 (H)  134 - 144 mmol/L Final   • Potassium 12/05/2022 4 9  3 5 - 5 2 mmol/L Final   • Chloride 12/05/2022 110 (H)  96 - 106 mmol/L Final   • CO2 12/05/2022 25  20 - 29 mmol/L Final   • CALCIUM 12/05/2022 9 4  8 7 - 10 3 mg/dL Final   • Cholesterol, Total 12/05/2022 159  100 - 199 mg/dL Final   • Triglycerides 12/05/2022 72  0 - 149 mg/dL Final   • HDL 12/05/2022 39 (L)  >39 mg/dL Final   • VLDL Cholesterol Calculated 12/05/2022 14  5 - 40 mg/dL Final   • LDL Calculated 12/05/2022 106 (H)  0 - 99 mg/dL Final   • Hemoglobin A1C 12/05/2022 6 8 (H)  4 8 - 5 6 % Final    Comment:          Prediabetes: 5 7 - 6 4           Diabetes: >6 4           Glycemic control for adults with diabetes: <7 0     • Estimated Average Glucose 12/05/2022 148  mg/dL Final         Patient Instructions   Diabetes    Check your fingerstick Accu-Chek once a day  Please check your fingerstick Accu-Chek different time of the day either at 7:00 a m  or 11:00 a m  for for p m  4 9:00 p m  Yaya Hitchcock Follow Diabetic diet for diabetes as advised  If you would sugar less than 80 or more than 300 to please call us on next visit is day  If the sugar is less than 80 follow-up hypoglycemia instructions as advised  Take your diabetic medicine as advised  Ari Carl MD        "This note has been constructed using a voice recognition system  Therefore there may be syntax, spelling, and/or grammatical errors   Please call if you have any questions  "

## 2022-12-13 DIAGNOSIS — J40 TRACHEOBRONCHITIS: Primary | ICD-10-CM

## 2022-12-13 RX ORDER — AZITHROMYCIN 250 MG/1
TABLET, FILM COATED ORAL
Qty: 6 TABLET | Refills: 0 | Status: SHIPPED | OUTPATIENT
Start: 2022-12-13 | End: 2022-12-17

## 2022-12-13 RX ORDER — AZITHROMYCIN 250 MG/1
250 TABLET, FILM COATED ORAL EVERY 24 HOURS
COMMUNITY
End: 2022-12-13 | Stop reason: SDUPTHER

## 2022-12-27 DIAGNOSIS — I16.0 HYPERTENSIVE URGENCY: ICD-10-CM

## 2022-12-27 RX ORDER — AMLODIPINE BESYLATE 5 MG/1
TABLET ORAL
Qty: 30 TABLET | Refills: 5 | Status: SHIPPED | OUTPATIENT
Start: 2022-12-27

## 2023-01-01 ENCOUNTER — HOSPITAL ENCOUNTER (EMERGENCY)
Facility: HOSPITAL | Age: 63
End: 2023-10-17
Attending: EMERGENCY MEDICINE
Payer: COMMERCIAL

## 2023-01-01 ENCOUNTER — TELEPHONE (OUTPATIENT)
Age: 63
End: 2023-01-01

## 2023-01-01 DIAGNOSIS — E11.22 TYPE 2 DIABETES MELLITUS WITH STAGE 3B CHRONIC KIDNEY DISEASE, WITH LONG-TERM CURRENT USE OF INSULIN (HCC): Primary | ICD-10-CM

## 2023-01-01 DIAGNOSIS — N18.32 TYPE 2 DIABETES MELLITUS WITH STAGE 3B CHRONIC KIDNEY DISEASE, WITH LONG-TERM CURRENT USE OF INSULIN (HCC): Primary | ICD-10-CM

## 2023-01-01 DIAGNOSIS — I46.9 CARDIAC ARREST (HCC): Primary | ICD-10-CM

## 2023-01-01 DIAGNOSIS — G25.81 RESTLESS LEG: ICD-10-CM

## 2023-01-01 DIAGNOSIS — Z79.4 TYPE 2 DIABETES MELLITUS WITH STAGE 3B CHRONIC KIDNEY DISEASE, WITH LONG-TERM CURRENT USE OF INSULIN (HCC): Primary | ICD-10-CM

## 2023-01-01 LAB
FLUAV RNA RESP QL NAA+PROBE: NEGATIVE
FLUBV RNA RESP QL NAA+PROBE: NEGATIVE
GLUCOSE SERPL-MCNC: 43 MG/DL (ref 65–140)
RSV RNA RESP QL NAA+PROBE: NEGATIVE
SARS-COV-2 RNA RESP QL NAA+PROBE: NEGATIVE

## 2023-01-01 PROCEDURE — 92950 HEART/LUNG RESUSCITATION CPR: CPT

## 2023-01-01 PROCEDURE — 82948 REAGENT STRIP/BLOOD GLUCOSE: CPT

## 2023-01-01 PROCEDURE — 0241U HB NFCT DS VIR RESP RNA 4 TRGT: CPT | Performed by: EMERGENCY MEDICINE

## 2023-01-01 RX ORDER — EPINEPHRINE IN SOD CHLOR,ISO 1 MG/10 ML
SYRINGE (ML) INTRAVENOUS CODE/TRAUMA/SEDATION MEDICATION
Status: COMPLETED | OUTPATIENT
Start: 2023-01-01 | End: 2023-01-01

## 2023-01-01 RX ORDER — SODIUM BICARBONATE 84 MG/ML
INJECTION, SOLUTION INTRAVENOUS CODE/TRAUMA/SEDATION MEDICATION
Status: COMPLETED | OUTPATIENT
Start: 2023-01-01 | End: 2023-01-01

## 2023-01-01 RX ORDER — PRAMIPEXOLE DIHYDROCHLORIDE 0.25 MG/1
0.25 TABLET ORAL 3 TIMES DAILY
Qty: 30 TABLET | Refills: 2 | Status: SHIPPED | OUTPATIENT
Start: 2023-01-01 | End: 2023-10-18 | Stop reason: CLARIF

## 2023-01-01 RX ORDER — DEXTROSE MONOHYDRATE 25 G/50ML
INJECTION, SOLUTION INTRAVENOUS CODE/TRAUMA/SEDATION MEDICATION
Status: COMPLETED | OUTPATIENT
Start: 2023-01-01 | End: 2023-01-01

## 2023-01-01 RX ADMIN — Medication 1 MG: at 07:37

## 2023-01-01 RX ADMIN — Medication 1 MG: at 07:33

## 2023-01-01 RX ADMIN — DEXTROSE MONOHYDRATE 50 ML: 500 INJECTION PARENTERAL at 07:41

## 2023-01-01 RX ADMIN — SODIUM BICARBONATE 50 MEQ: 84 INJECTION, SOLUTION INTRAVENOUS at 07:39

## 2023-01-10 ENCOUNTER — PREP FOR PROCEDURE (OUTPATIENT)
Dept: GASTROENTEROLOGY | Facility: CLINIC | Age: 63
End: 2023-01-10

## 2023-01-10 ENCOUNTER — TELEPHONE (OUTPATIENT)
Dept: GASTROENTEROLOGY | Facility: CLINIC | Age: 63
End: 2023-01-10

## 2023-01-10 DIAGNOSIS — Z12.11 COLON CANCER SCREENING: Primary | ICD-10-CM

## 2023-01-10 NOTE — TELEPHONE ENCOUNTER
01/10/23  Screened by: Ruthann Skinner MA    Referring Provider DR SARAH LEE    Pre- Screening: Body mass index is 28 15 kg/m²  Has patient been referred for a routine screening Colonoscopy? yes  Is the patient between 39-70 years old? yes      Previous Colonoscopy no   If yes:    Date:     Facility:     Reason:       SCHEDULING STAFF: If the patient is between 45yrs-49yrs, please advise patient to confirm benefits/coverage with their insurance company for a routine screening colonoscopy, some insurance carriers will only cover at [id number] or older  If the patient is over 66years old, please schedule an office visit  Does the patient want to see a Gastroenterologist prior to their procedure OR are they having any GI symptoms? no    Has the patient been hospitalized or had abdominal surgery in the past 6 months? no    Does the patient use supplemental oxygen? no    Does the patient take Coumadin, Lovenox, Plavix, Elliquis, Xarelto, or other blood thinning medication? no    Has the patient had a stroke, cardiac event, or stent placed in the past year? no    SCHEDULING STAFF: If patient answers NO to above questions, then schedule procedure  If patient answers YES to above questions, then schedule office appointment  If patient is between 45yrs - 49yrs, please advise patient that we will have to confirm benefits & coverage with their insurance company for a routine screening colonoscopy          Scheduled date of colonoscopy (as of today): 2/22/23  Physician performing colonoscopy:Dr Armando  Location of colonoscopy:[facility]  Bowel prep reviewed with patient: Golytely  Instructions reviewed with patient by: Celia/julienne  Clearances: N/A

## 2023-01-27 ENCOUNTER — APPOINTMENT (EMERGENCY)
Dept: RADIOLOGY | Facility: HOSPITAL | Age: 63
End: 2023-01-27

## 2023-01-27 ENCOUNTER — APPOINTMENT (EMERGENCY)
Dept: CT IMAGING | Facility: HOSPITAL | Age: 63
End: 2023-01-27

## 2023-01-27 ENCOUNTER — HOSPITAL ENCOUNTER (INPATIENT)
Facility: HOSPITAL | Age: 63
LOS: 4 days | Discharge: HOME | End: 2023-01-31
Attending: INTERNAL MEDICINE | Admitting: STUDENT IN AN ORGANIZED HEALTH CARE EDUCATION/TRAINING PROGRAM

## 2023-01-27 DIAGNOSIS — I50.43 ACUTE ON CHRONIC COMBINED SYSTOLIC AND DIASTOLIC HEART FAILURE (HCC): ICD-10-CM

## 2023-01-27 DIAGNOSIS — J96.01 ACUTE RESPIRATORY FAILURE WITH HYPOXIA (HCC): ICD-10-CM

## 2023-01-27 DIAGNOSIS — J81.0 ACUTE PULMONARY EDEMA (HCC): ICD-10-CM

## 2023-01-27 DIAGNOSIS — R09.02 HYPOXIA: Primary | ICD-10-CM

## 2023-01-27 DIAGNOSIS — R07.9 CHEST PAIN, UNSPECIFIED TYPE: ICD-10-CM

## 2023-01-27 PROBLEM — I82.402 DEEP VEIN THROMBOSIS (DVT) OF LEFT LOWER EXTREMITY (HCC): Status: ACTIVE | Noted: 2023-01-27

## 2023-01-27 LAB
2HR DELTA HS TROPONIN: 5 NG/L
4HR DELTA HS TROPONIN: 12 NG/L
ALBUMIN SERPL BCP-MCNC: 5 G/DL (ref 3.5–5)
ALP SERPL-CCNC: 137 U/L (ref 34–104)
ALT SERPL W P-5'-P-CCNC: 26 U/L (ref 7–52)
ANION GAP SERPL CALCULATED.3IONS-SCNC: 13 MMOL/L (ref 4–13)
APTT PPP: 27 SECONDS (ref 23–37)
AST SERPL W P-5'-P-CCNC: 25 U/L (ref 13–39)
BASOPHILS # BLD AUTO: 0.02 THOUSANDS/ÂΜL (ref 0–0.1)
BASOPHILS NFR BLD AUTO: 0 % (ref 0–1)
BILIRUB SERPL-MCNC: 0.71 MG/DL (ref 0.2–1)
BNP SERPL-MCNC: 342 PG/ML (ref 0–100)
BUN SERPL-MCNC: 18 MG/DL (ref 5–25)
CALCIUM SERPL-MCNC: 9.9 MG/DL (ref 8.4–10.2)
CARDIAC TROPONIN I PNL SERPL HS: 14 NG/L
CARDIAC TROPONIN I PNL SERPL HS: 21 NG/L
CARDIAC TROPONIN I PNL SERPL HS: 9 NG/L
CHLORIDE SERPL-SCNC: 103 MMOL/L (ref 96–108)
CO2 SERPL-SCNC: 22 MMOL/L (ref 21–32)
CREAT SERPL-MCNC: 0.89 MG/DL (ref 0.6–1.3)
D DIMER PPP FEU-MCNC: 0.87 UG/ML FEU
EOSINOPHIL # BLD AUTO: 0.23 THOUSAND/ÂΜL (ref 0–0.61)
EOSINOPHIL NFR BLD AUTO: 2 % (ref 0–6)
ERYTHROCYTE [DISTWIDTH] IN BLOOD BY AUTOMATED COUNT: 14 % (ref 11.6–15.1)
FLUAV RNA RESP QL NAA+PROBE: NEGATIVE
FLUBV RNA RESP QL NAA+PROBE: NEGATIVE
GFR SERPL CREATININE-BSD FRML MDRD: 69 ML/MIN/1.73SQ M
GLUCOSE SERPL-MCNC: 159 MG/DL (ref 65–140)
GLUCOSE SERPL-MCNC: 189 MG/DL (ref 65–140)
HCT VFR BLD AUTO: 52.1 % (ref 34.8–46.1)
HGB BLD-MCNC: 17.7 G/DL (ref 11.5–15.4)
IMM GRANULOCYTES # BLD AUTO: 0.03 THOUSAND/UL (ref 0–0.2)
IMM GRANULOCYTES NFR BLD AUTO: 0 % (ref 0–2)
INR PPP: 0.97 (ref 0.84–1.19)
LYMPHOCYTES # BLD AUTO: 3.79 THOUSANDS/ÂΜL (ref 0.6–4.47)
LYMPHOCYTES NFR BLD AUTO: 33 % (ref 14–44)
MCH RBC QN AUTO: 29.8 PG (ref 26.8–34.3)
MCHC RBC AUTO-ENTMCNC: 34 G/DL (ref 31.4–37.4)
MCV RBC AUTO: 88 FL (ref 82–98)
MONOCYTES # BLD AUTO: 0.4 THOUSAND/ÂΜL (ref 0.17–1.22)
MONOCYTES NFR BLD AUTO: 4 % (ref 4–12)
NEUTROPHILS # BLD AUTO: 7 THOUSANDS/ÂΜL (ref 1.85–7.62)
NEUTS SEG NFR BLD AUTO: 61 % (ref 43–75)
NRBC BLD AUTO-RTO: 0 /100 WBCS
PLATELET # BLD AUTO: 313 THOUSANDS/UL (ref 149–390)
PMV BLD AUTO: 10.1 FL (ref 8.9–12.7)
POTASSIUM SERPL-SCNC: 3.6 MMOL/L (ref 3.5–5.3)
PROT SERPL-MCNC: 8.9 G/DL (ref 6.4–8.4)
PROTHROMBIN TIME: 13.2 SECONDS (ref 11.6–14.5)
RBC # BLD AUTO: 5.94 MILLION/UL (ref 3.81–5.12)
RSV RNA RESP QL NAA+PROBE: NEGATIVE
SARS-COV-2 RNA RESP QL NAA+PROBE: NEGATIVE
SODIUM SERPL-SCNC: 138 MMOL/L (ref 135–147)
WBC # BLD AUTO: 11.47 THOUSAND/UL (ref 4.31–10.16)

## 2023-01-27 RX ORDER — ATORVASTATIN CALCIUM 40 MG/1
80 TABLET, FILM COATED ORAL EVERY EVENING
Status: DISCONTINUED | OUTPATIENT
Start: 2023-01-27 | End: 2023-01-31 | Stop reason: HOSPADM

## 2023-01-27 RX ORDER — DOCUSATE SODIUM 100 MG/1
100 CAPSULE, LIQUID FILLED ORAL 2 TIMES DAILY
Status: DISCONTINUED | OUTPATIENT
Start: 2023-01-27 | End: 2023-01-31 | Stop reason: HOSPADM

## 2023-01-27 RX ORDER — INSULIN LISPRO 100 [IU]/ML
1-5 INJECTION, SOLUTION INTRAVENOUS; SUBCUTANEOUS
Status: DISCONTINUED | OUTPATIENT
Start: 2023-01-27 | End: 2023-01-31 | Stop reason: HOSPADM

## 2023-01-27 RX ORDER — ALPRAZOLAM 0.5 MG/1
0.5 TABLET ORAL DAILY PRN
Status: DISCONTINUED | OUTPATIENT
Start: 2023-01-27 | End: 2023-01-31 | Stop reason: HOSPADM

## 2023-01-27 RX ORDER — FUROSEMIDE 10 MG/ML
40 INJECTION INTRAMUSCULAR; INTRAVENOUS 2 TIMES DAILY
Status: DISCONTINUED | OUTPATIENT
Start: 2023-01-28 | End: 2023-01-31

## 2023-01-27 RX ORDER — METOPROLOL SUCCINATE 50 MG/1
50 TABLET, EXTENDED RELEASE ORAL DAILY
Status: DISCONTINUED | OUTPATIENT
Start: 2023-01-28 | End: 2023-01-31 | Stop reason: HOSPADM

## 2023-01-27 RX ORDER — ACETAMINOPHEN 325 MG/1
650 TABLET ORAL EVERY 6 HOURS PRN
Status: DISCONTINUED | OUTPATIENT
Start: 2023-01-27 | End: 2023-01-31 | Stop reason: HOSPADM

## 2023-01-27 RX ORDER — MAGNESIUM HYDROXIDE/ALUMINUM HYDROXICE/SIMETHICONE 120; 1200; 1200 MG/30ML; MG/30ML; MG/30ML
30 SUSPENSION ORAL EVERY 6 HOURS PRN
Status: DISCONTINUED | OUTPATIENT
Start: 2023-01-27 | End: 2023-01-31 | Stop reason: HOSPADM

## 2023-01-27 RX ORDER — FUROSEMIDE 10 MG/ML
20 INJECTION INTRAMUSCULAR; INTRAVENOUS ONCE
Status: COMPLETED | OUTPATIENT
Start: 2023-01-27 | End: 2023-01-27

## 2023-01-27 RX ORDER — INSULIN LISPRO 100 [IU]/ML
6 INJECTION, SOLUTION INTRAVENOUS; SUBCUTANEOUS
Status: DISCONTINUED | OUTPATIENT
Start: 2023-01-28 | End: 2023-01-31 | Stop reason: HOSPADM

## 2023-01-27 RX ORDER — INSULIN LISPRO 100 [IU]/ML
1-5 INJECTION, SOLUTION INTRAVENOUS; SUBCUTANEOUS
Status: DISCONTINUED | OUTPATIENT
Start: 2023-01-28 | End: 2023-01-31 | Stop reason: HOSPADM

## 2023-01-27 RX ORDER — AMLODIPINE BESYLATE 5 MG/1
5 TABLET ORAL DAILY
Status: DISCONTINUED | OUTPATIENT
Start: 2023-01-28 | End: 2023-01-28

## 2023-01-27 RX ORDER — DILTIAZEM HYDROCHLORIDE 5 MG/ML
15 INJECTION INTRAVENOUS ONCE
Status: COMPLETED | OUTPATIENT
Start: 2023-01-27 | End: 2023-01-27

## 2023-01-27 RX ORDER — INSULIN GLARGINE 100 [IU]/ML
15 INJECTION, SOLUTION SUBCUTANEOUS
Status: DISCONTINUED | OUTPATIENT
Start: 2023-01-27 | End: 2023-01-31 | Stop reason: HOSPADM

## 2023-01-27 RX ORDER — ASPIRIN 81 MG/1
81 TABLET, CHEWABLE ORAL DAILY
Status: DISCONTINUED | OUTPATIENT
Start: 2023-01-28 | End: 2023-01-31 | Stop reason: HOSPADM

## 2023-01-27 RX ORDER — NITROGLYCERIN 0.4 MG/1
0.4 TABLET SUBLINGUAL
Status: DISCONTINUED | OUTPATIENT
Start: 2023-01-27 | End: 2023-01-31 | Stop reason: HOSPADM

## 2023-01-27 RX ORDER — ENOXAPARIN SODIUM 100 MG/ML
1 INJECTION SUBCUTANEOUS 2 TIMES DAILY
Status: DISCONTINUED | OUTPATIENT
Start: 2023-01-27 | End: 2023-01-30

## 2023-01-27 RX ORDER — ASPIRIN 325 MG
325 TABLET ORAL ONCE
Status: COMPLETED | OUTPATIENT
Start: 2023-01-27 | End: 2023-01-27

## 2023-01-27 RX ADMIN — ENOXAPARIN SODIUM 70 MG: 80 INJECTION SUBCUTANEOUS at 22:33

## 2023-01-27 RX ADMIN — ASPIRIN 325 MG: 325 TABLET ORAL at 18:28

## 2023-01-27 RX ADMIN — INSULIN GLARGINE 15 UNITS: 100 INJECTION, SOLUTION SUBCUTANEOUS at 22:33

## 2023-01-27 RX ADMIN — FUROSEMIDE 20 MG: 10 INJECTION, SOLUTION INTRAMUSCULAR; INTRAVENOUS at 22:33

## 2023-01-27 RX ADMIN — DILTIAZEM HYDROCHLORIDE 15 MG: 5 INJECTION INTRAVENOUS at 18:28

## 2023-01-27 RX ADMIN — FUROSEMIDE 20 MG: 10 INJECTION, SOLUTION INTRAMUSCULAR; INTRAVENOUS at 18:27

## 2023-01-27 RX ADMIN — INSULIN LISPRO 1 UNITS: 100 INJECTION, SOLUTION INTRAVENOUS; SUBCUTANEOUS at 22:34

## 2023-01-27 RX ADMIN — IOHEXOL 100 ML: 350 INJECTION, SOLUTION INTRAVENOUS at 19:32

## 2023-01-27 RX ADMIN — ATORVASTATIN CALCIUM 80 MG: 40 TABLET, FILM COATED ORAL at 22:34

## 2023-01-27 RX ADMIN — NITROGLYCERIN 1 INCH: 20 OINTMENT TOPICAL at 18:33

## 2023-01-27 NOTE — ED PROVIDER NOTES
History  Chief Complaint   Patient presents with   • Shortness of Breath     Pt presents to ED from home w/ sob starting  20 mins pta  Pt (+) DM, CHF, HTN      THIS is 59y old came for having sudden onset of sob and chest pain PTA  Pt was doing shopping , and went to her car and felt chest tightness and sob  Pt came to er and has PO 90% on RA  Pt is tachycardic and -150/min  Pt has h/o of DM, HTN, CHF  Pt has no fever, but she is keep coughing at er  Pt had cardiac cath many years ago   Pt has RR 30/min,  And she is tachyonic   Pt denies abdominal pain , nausea vomiting , but she is diaphoretic  Pt did not take covid or flu vaccine  Prior to Admission Medications   Prescriptions Last Dose Informant Patient Reported? Taking? ALPRAZolam (XANAX) 0 5 mg tablet Unknown  No No   Sig: Take 1 tablet (0 5 mg total) by mouth daily as needed for anxiety   Alcohol Swabs 70 % PADS Unknown  No No   Sig: May substitute brand based on insurance coverage  Check glucose TID  Insulin Pen Needle (BD Pen Needle Megan 2nd Gen) 32G X 4 MM MISC Unknown  No No   Sig: For use with insulin pen  Pharmacy may dispense brand covered by insurance  Insulin Pen Needle (BD Pen Needle Megan 2nd Gen) 32G X 4 MM MISC Unknown  No No   Sig: For use with insulin pen  Pharmacy may dispense brand covered by insurance     Lantus SoloStar 100 units/mL injection pen   No No   Sig: INJECT 15 UNITS UNDER THE SKIN DAILY AT BEDTIME   Microlet Lancets MISC Unknown  No No   Sig: USE TO TEST 3 TIMES A DAY   Xigduo XR 5-1000 MG TB24   No No   Sig: Take 1 tablet by mouth in the morning   amLODIPine (NORVASC) 5 mg tablet 1/27/2023  No Yes   Sig: TAKE 1 TABLET BY MOUTH EVERY DAY   aspirin 81 MG tablet 1/27/2023  Yes Yes   Sig: Take 81 mg by mouth daily   atorvastatin (LIPITOR) 80 mg tablet 1/26/2023  No Yes   Sig: Take 1 tablet (80 mg total) by mouth every evening   furosemide (LASIX) 40 mg tablet Not Taking  Yes No   Sig: Take by mouth   Patient not taking: Reported on 1/27/2023   glucose blood (Contour Next Test) test strip Unknown  Yes No   Sig: Use 1 each 3 (three) times a day Use as instructed   glucose blood (FREESTYLE TEST STRIPS) test strip Unknown  No No   Sig: May substitute brand based on insurance coverage  Check glucose TID  glucose monitoring kit (FREESTYLE) monitoring kit Unknown  No No   Sig: May substitute brand based on insurance coverage  Check glucose TID  insulin lispro (HumaLOG KwikPen) 100 units/mL injection pen Unknown  No No   Sig: Inject 6 Units under the skin 3 (three) times a day with meals   metoprolol succinate (TOPROL-XL) 50 mg 24 hr tablet 1/27/2023  No Yes   Sig: Take 1 tablet (50 mg total) by mouth daily   nitroglycerin (NITROSTAT) 0 4 mg SL tablet Unknown  Yes No   Sig: Place 0 4 mg under the tongue as needed   polyethylene glycol (GOLYTELY) 4000 mL solution Not Taking  No No   Sig: Take as directed by the office  Patient not taking: Reported on 1/27/2023      Facility-Administered Medications: None       Past Medical History:   Diagnosis Date   • Abdominal pain    • Anxiety disorder    • Diabetes mellitus ([facility] [address] )    • Dizziness    • Heart failure (HCC)    • High cholesterol    • Hypertension    • Lightheadedness    • Palpitations    • SOB (shortness of breath)        History reviewed  No pertinent surgical history  History reviewed  No pertinent family history  I have reviewed and agree with the history as documented  E-Cigarette/Vaping   • E-Cigarette Use Never User      E-Cigarette/Vaping Substances   • Nicotine No    • THC No    • CBD No    • Flavoring No    • Other No    • Unknown No      Social History     Tobacco Use   • Smoking status: Former   • Smokeless tobacco: Former   Vaping Use   • Vaping Use: Never used   Substance Use Topics   • Alcohol use: Yes     Comment: social   • Drug use: Never       Review of Systems   Constitutional: Negative for fatigue and fever     HENT: Negative for congestion, rhinorrhea, sinus pressure, sinus pain, sneezing, sore throat and tinnitus  Respiratory: Positive for cough and shortness of breath  Cardiovascular: Positive for chest pain  Negative for palpitations  Gastrointestinal: Negative for abdominal pain, diarrhea, nausea and vomiting  Endocrine: Negative for polydipsia, polyphagia and polyuria  Genitourinary: Negative for difficulty urinating, dysuria, flank pain and frequency  Musculoskeletal: Negative for back pain, myalgias, neck pain and neck stiffness  Skin: Negative for color change, pallor and rash  Neurological: Negative for dizziness, light-headedness and headaches  Psychiatric/Behavioral: Negative for agitation and behavioral problems  Physical Exam  Physical Exam  Vitals and nursing note reviewed  Constitutional:       General: She is in acute distress  Appearance: She is well-developed  She is ill-appearing  She is not toxic-appearing or diaphoretic  HENT:      Head: Normocephalic and atraumatic  Mouth/Throat:      Mouth: Mucous membranes are moist       Pharynx: No pharyngeal swelling or oropharyngeal exudate  Neck:      Thyroid: No thyromegaly  Vascular: No hepatojugular reflux or JVD  Trachea: No tracheal deviation  Cardiovascular:      Rate and Rhythm: Tachycardia present  Rhythm irregular  No extrasystoles are present  Pulses: No decreased pulses  Heart sounds: Normal heart sounds  No murmur heard  No friction rub  No gallop  Pulmonary:      Effort: Tachypnea and respiratory distress present  No bradypnea or accessory muscle usage  Breath sounds: No stridor  Examination of the right-middle field reveals rhonchi and rales  Examination of the left-middle field reveals rhonchi and rales  Examination of the right-lower field reveals rales  Examination of the left-lower field reveals rales  Rhonchi and rales present  No decreased breath sounds or wheezing     Chest:      Chest wall: No mass, deformity, tenderness, crepitus or edema  There is no dullness to percussion  Abdominal:      General: Bowel sounds are normal       Palpations: Abdomen is soft  There is no hepatomegaly, splenomegaly or mass  Tenderness: There is no abdominal tenderness  There is no guarding or rebound  Musculoskeletal:         General: No tenderness or deformity  Normal range of motion  Cervical back: Normal range of motion and neck supple  Right lower leg: No tenderness  No edema  Left lower leg: No tenderness  No edema  Lymphadenopathy:      Cervical: No cervical adenopathy  Skin:     General: Skin is warm and dry  Capillary Refill: Capillary refill takes less than 2 seconds  Coloration: Skin is not cyanotic or pale  Findings: No ecchymosis, erythema or rash  Nails: There is no clubbing  Neurological:      Mental Status: She is alert and oriented to person, place, and time     Psychiatric:         Behavior: Behavior normal          Vital Signs  ED Triage Vitals   Temperature Pulse Respirations Blood Pressure SpO2   01/27/23 1810 01/27/23 1807 01/27/23 1806 01/27/23 1810 01/27/23 1807   (!) 97 4 °F (36 3 °C) (!) 146 (!) 30 (!) 186/114 90 %      Temp Source Heart Rate Source Patient Position - Orthostatic VS BP Location FiO2 (%)   01/27/23 1810 01/27/23 1807 01/27/23 2014 01/27/23 2014 --   Oral Monitor Sitting Right arm       Pain Score       01/27/23 2215       No Pain           Vitals:    01/28/23 0400 01/28/23 0745 01/28/23 0857 01/28/23 1121   BP: 118/81 116/67 116/64 105/63   Pulse: 77 74 75 63   Patient Position - Orthostatic VS: Lying Lying  Lying         Visual Acuity      ED Medications  Medications   aspirin chewable tablet 81 mg (81 mg Oral Given 1/28/23 0857)   atorvastatin (LIPITOR) tablet 80 mg (80 mg Oral Given 1/27/23 2234)   insulin lispro (HumaLOG) 100 units/mL subcutaneous injection 6 Units (6 Units Subcutaneous Given 1/28/23 1236)   insulin glargine (LANTUS) subcutaneous injection 15 Units 0 15 mL (15 Units Subcutaneous Given 1/27/23 2233)   metoprolol succinate (TOPROL-XL) 24 hr tablet 50 mg (50 mg Oral Given 1/28/23 0857)   ALPRAZolam (XANAX) tablet 0 5 mg (has no administration in time range)   nitroglycerin (NITROSTAT) SL tablet 0 4 mg (has no administration in time range)   amLODIPine (NORVASC) tablet 5 mg (5 mg Oral Given 1/28/23 0857)   acetaminophen (TYLENOL) tablet 650 mg (has no administration in time range)   docusate sodium (COLACE) capsule 100 mg (100 mg Oral Given 1/28/23 0857)   aluminum-magnesium hydroxide-simethicone (MYLANTA) oral suspension 30 mL (has no administration in time range)   furosemide (LASIX) injection 40 mg (40 mg Intravenous Given 1/28/23 0858)   enoxaparin (LOVENOX) subcutaneous injection 70 mg (70 mg Subcutaneous Given 1/28/23 0857)   insulin lispro (HumaLOG) 100 units/mL subcutaneous injection 1-5 Units (1 Units Subcutaneous Not Given 1/28/23 1155)   insulin lispro (HumaLOG) 100 units/mL subcutaneous injection 1-5 Units (1 Units Subcutaneous Given 1/27/23 2234)   aspirin tablet 325 mg (325 mg Oral Given 1/27/23 1828)   furosemide (LASIX) injection 20 mg (20 mg Intravenous Given 1/27/23 1827)   diltiazem (CARDIZEM) injection 15 mg (15 mg Intravenous Given 1/27/23 1828)   nitroglycerin (NITRO-BID) 2 % TD ointment 1 inch (1 inch Topical Given 1/27/23 1833)   iohexol (OMNIPAQUE) 350 MG/ML injection (SINGLE-DOSE) 100 mL (100 mL Intravenous Given 1/27/23 1932)   furosemide (LASIX) injection 20 mg (20 mg Intravenous Given 1/27/23 2233)       Diagnostic Studies  Results Reviewed     Procedure Component Value Units Date/Time    Blood culture #1 [[id number]] Collected: 01/27/23 1940    Lab Status: Preliminary result Specimen: Blood from Arm, Right Updated: 01/28/23 0001     Blood Culture Received in Microbiology Lab  Culture in Progress      Blood culture #2 [[id number]] Collected: 01/27/23 1831    Lab Status: Preliminary result Specimen: Blood from Arm, Left Updated: 01/28/23 0001     Blood Culture Received in Microbiology Lab  Culture in Progress  HS Troponin I 4hr [[id number]]  (Normal) Collected: 01/27/23 2253    Lab Status: Final result Specimen: Blood from Hand, Right Updated: 01/27/23 2325     hs TnI 4hr 21 ng/L      Delta 4hr hsTnI 12 ng/L     HS Troponin I 2hr [[id number]]  (Normal) Collected: 01/27/23 2022    Lab Status: Final result Specimen: Blood from Arm, Left Updated: 01/27/23 2049     hs TnI 2hr 14 ng/L      Delta 2hr hsTnI 5 ng/L     FLU/RSV/COVID - if FLU/RSV clinically relevant [[id number]]  (Normal) Collected: 01/27/23 1831    Lab Status: Final result Specimen: Nares from Nose Updated: 01/27/23 1912     SARS-CoV-2 Negative     INFLUENZA A PCR Negative     INFLUENZA B PCR Negative     RSV PCR Negative    Narrative:      FOR PEDIATRIC PATIENTS - copy/paste COVID Guidelines URL to browser: [email or web address]  [email or web address]    SARS-CoV-2 assay is a Nucleic Acid Amplification assay intended for the  qualitative detection of nucleic acid from SARS-CoV-2 in nasopharyngeal  swabs  Results are for the presumptive identification of SARS-CoV-2 RNA  Positive results are indicative of infection with SARS-CoV-2, the virus  causing COVID-19, but do not rule out bacterial infection or co-infection  with other viruses  Laboratories within the [place] and its  territories are required to report all positive results to the appropriate  public health authorities  Negative results do not preclude SARS-CoV-2  infection and should not be used as the sole basis for treatment or other  patient management decisions  Negative results must be combined with  clinical observations, patient history, and epidemiological information  This test has not been FDA cleared or approved  This test has been authorized by FDA under an Emergency Use Authorization  (EUA)   This test is only authorized for the duration of time the  declaration that circumstances exist justifying the authorization of the  emergency use of an in vitro diagnostic tests for detection of SARS-CoV-2  virus and/or diagnosis of COVID-19 infection under section 564(b)(1) of  the Act, 21 U  S C  687HVZ-4(P)(2), unless the authorization is terminated  or revoked sooner  The test has been validated but independent review by FDA  and CLIA is pending  Test performed using [employer] GeneXpert: This RT-PCR assay targets N2,  a region unique to SARS-CoV-2  A conserved region in the E-gene was chosen  for pan-Sarbecovirus detection which includes SARS-CoV-2  According to CMS-2020-01-R, this platform meets the definition of high-throughput technology      B-Type Natriuretic Peptide(BNP) [[id number]]  (Abnormal) Collected: 01/27/23 1831    Lab Status: Final result Specimen: Blood from Arm, Left Updated: 01/27/23 1902      pg/mL     HS Troponin 0hr (reflex protocol) [[id number]]  (Normal) Collected: 01/27/23 1831    Lab Status: Final result Specimen: Blood from Arm, Left Updated: 01/27/23 1857     hs TnI 0hr 9 ng/L     Comprehensive metabolic panel [[id number]]  (Abnormal) Collected: 01/27/23 1831    Lab Status: Final result Specimen: Blood from Arm, Left Updated: 01/27/23 1853     Sodium 138 mmol/L      Potassium 3 6 mmol/L      Chloride 103 mmol/L      CO2 22 mmol/L      ANION GAP 13 mmol/L      BUN 18 mg/dL      Creatinine 0 89 mg/dL      Glucose 159 mg/dL      Calcium 9 9 mg/dL      AST 25 U/L      ALT 26 U/L      Alkaline Phosphatase 137 U/L      Total Protein 8 9 g/dL      Albumin 5 0 g/dL      Total Bilirubin 0 71 mg/dL      eGFR 69 ml/min/1 73sq m     Narrative:      Evelyn guidelines for Chronic Kidney Disease (CKD):   •  Stage 1 with normal or high GFR (GFR > 90 mL/min/1 73 square meters)  •  Stage 2 Mild CKD (GFR = 60-89 mL/min/1 73 square meters)  •  Stage 3A Moderate CKD (GFR = 45-59 mL/min/1 73 square meters)  •  Stage 3B Moderate CKD (GFR = 30-44 mL/min/1 73 square meters)  •  Stage 4 Severe CKD (GFR = 15-29 mL/min/1 73 square meters)  •  Stage 5 End Stage CKD (GFR <15 mL/min/1 73 square meters)  Note: GFR calculation is accurate only with a steady state creatinine    D-dimer, quantitative [[id number]]  (Abnormal) Collected: 01/27/23 1831    Lab Status: Final result Specimen: Blood from Arm, Left Updated: 01/27/23 1847     D-Dimer, Quant 0 87 ug/ml FEU     Narrative: In the evaluation for possible pulmonary embolism, in the appropriate (Well's Score of 4 or less) patient, the age adjusted d-dimer cutoff for this patient can be calculated as:    Age x 0 01 (in ug/mL) for Age-adjusted D-dimer exclusion threshold for a patient over 50 years      APTT [[id number]]  (Normal) Collected: 01/27/23 1831    Lab Status: Final result Specimen: Blood from Arm, Left Updated: 01/27/23 1846     PTT 27 seconds     Protime-INR [[id number]]  (Normal) Collected: 01/27/23 1831    Lab Status: Final result Specimen: Blood from Arm, Left Updated: 01/27/23 1846     Protime 13 2 seconds      INR 0 97    CBC and differential [[id number]]  (Abnormal) Collected: 01/27/23 1831    Lab Status: Final result Specimen: Blood from Arm, Left Updated: 01/27/23 1834     WBC 11 47 Thousand/uL      RBC 5 94 Million/uL      Hemoglobin 17 7 g/dL      Hematocrit 52 1 %      MCV 88 fL      MCH 29 8 pg      MCHC 34 0 g/dL      RDW 14 0 %      MPV 10 1 fL      Platelets 313 Thousands/uL      nRBC 0 /100 WBCs      Neutrophils Relative 61 %      Immat GRANS % 0 %      Lymphocytes Relative 33 %      Monocytes Relative 4 %      Eosinophils Relative 2 %      Basophils Relative 0 %      Neutrophils Absolute 7 00 Thousands/µL      Immature Grans Absolute 0 03 Thousand/uL      Lymphocytes Absolute 3 79 Thousands/µL      Monocytes Absolute 0 40 Thousand/µL      Eosinophils Absolute 0 23 Thousand/µL      Basophils Absolute 0 02 Thousands/µL                  CTA ED chest PE Study   Final Result by Danae Granda MD (01/27 2024)      No pulmonary embolus  Extensive septal thickening and groundglass opacity due to interstitial and alveolar edema with trace effusions  Workstation performed: [id number]         XR chest portable   ED Interpretation by Dany Carvajal MD (01/27 2029)   CXR; bilateral pulmonary congestion c/w mild pulmonary edema  Final Result by Natalia Ochoa MD ([phone])      Borderline cardiomegaly  Slight vascular congestion                  Workstation performed: [id number]         VAS reflux lower limb venous duplex study with reflux assesment, unilateral    (Results Pending)              Procedures  ECG 12 Lead Documentation Only    Date/Time: 1/27/2023 7:49 PM  Performed by: Dany Carvajal MD  Authorized by: Dany Carvajal MD     Indications / Diagnosis:  CHEST PAIN  Patient location:  ED and bedside  Interpretation:     Interpretation: abnormal    Rate:     ECG rate:  143    ECG rate assessment: tachycardic    Rhythm:     Rhythm: sinus bradycardia    Ectopy:     Ectopy: PVCs    QRS:     QRS axis:  Left  Conduction:     Conduction: abnormal      Abnormal conduction: non-specific intraventricular conduction delay    T waves:     T waves: non-specific    Q waves:     Q waves:  V1, V2 and V3  Other findings:     Other findings: poor R wave progression    ECG 12 Lead Documentation Only    Date/Time: 1/27/2023 7:51 PM  Performed by: Dany Carvajal MD  Authorized by: Dany Carvajal MD     Indications / Diagnosis:  CHEST PAIN  ECG reviewed by me, the ED Provider: yes    Patient location:  ED and bedside  Previous ECG:     Previous ECG:  Compared to current    Similarity:  No change    Comparison to cardiac monitor: Yes    Interpretation:     Interpretation: abnormal    Rate:     ECG rate:  122    ECG rate assessment: tachycardic    Ectopy:     Ectopy: PVCs    QRS:     QRS axis:  Left    QRS intervals:   Wide  Conduction: Conduction: abnormal      Abnormal conduction: non-specific intraventricular conduction delay    Comments:      POOR R wave progression in chest leads  ECG 12 Lead Documentation Only    Date/Time: 1/27/2023 7:52 PM  Performed by: Sourav Franco MD  Authorized by: Sourav Franco MD     Indications / Diagnosis:  Sob chest pain  Patient location:  ED and bedside  Previous ECG:     Previous ECG:  Compared to current    Similarity:  No change  Interpretation:     Interpretation: abnormal    Rate:     ECG rate:  103    ECG rate assessment: tachycardic    Rhythm:     Rhythm: sinus tachycardia    Ectopy:     Ectopy: PVCs    QRS:     QRS axis:  Left    QRS intervals: Wide  Conduction:     Conduction: abnormal      Abnormal conduction: non-specific intraventricular conduction delay    T waves:     T waves: non-specific    Other findings:     Other findings: LVH and poor R wave progression               ED Course  ED Course as of 01/28/23 1252   Fri Jan 27, 2023 1953 While pt at er has a run of none sustained V  Tachy and it happens one time  for 2 seconds  DANIEL Risk Score    Flowsheet Row Most Recent Value   Age >= 72 0 Filed at: 01/27/2023 2144   Known CAD (stenosis >= 50%) 1 Filed at: 01/27/2023 2144   Recent (<=24 hrs) Service Angina 1 Filed at: 01/27/2023 2144   ST Deviation >= 0 5 mm 0 Filed at: 01/27/2023 2144   3+ CAD Risk Factors (FHx, HTN, HLP, DM, Smoker) 0 Filed at: 01/27/2023 2144   Aspirin Use Past 7 Days 1 Filed at: 01/27/2023 2144   Elevated Cardiac Markers 0 Filed at: 01/27/2023 2144   DANIEL Risk Score (Calculated) 3 Filed at: 01/27/2023 2144                  Medical Decision Making  This is 59y old came for SOB and CP  , PTA  And on arrival to er PO 90% on RA, and pt was tachypnic   EKG shows sinus tachy and rate 140/min, pt given O2, Cardizem and NTG and lasix and she started to feel better and chest pain is gone    Labs shows high H/H, , CXR pulmonary congestion possible pulmonary edema  CTA chest ; no PE , and interstial and alveolar edema  Pt has a single run of V  Tach , troponin is negative  Will admit for ;  Hypoxia, pulmonary edema, chest pain  Differential diagnosis; chest pain, CAD, PE, aortic dissection, pneumonia, CHF  Acute pulmonary edema [facility]): acute illness or injury  Chest pain, unspecified type: acute illness or injury  Hypoxia: acute illness or injury  Amount and/or Complexity of Data Reviewed  Labs: ordered  Details: high H/H, normal tropnine  Radiology: ordered and independent interpretation performed  Details: cxr; interstial and alveolar edema   CTA interstial and alveolar edema no PE    ECG/medicine tests: ordered and independent interpretation performed  Details: SINUS tachy , PVCS  Risk  OTC drugs  Prescription drug management            Disposition  Final diagnoses:   Hypoxia   Acute pulmonary edema (HCC)   Chest pain, unspecified type     Time reflects when diagnosis was documented in both MDM as applicable and the Disposition within this note     Time User Action Codes Description Comment    1/27/2023  8:36 PM Shannan Garza Add [R09 02] Hypoxia     1/27/2023  8:36 PM Darrion Merida Add [J81 0] Acute pulmonary edema ([facility] [address] )     1/27/2023  8:36 PM Darrion Merida Add [R07 9] Chest pain, unspecified type     1/27/2023  9:49 PM Vineet Cottrell Add [I50 43] Acute on chronic combined systolic and diastolic heart failure [facility])       ED Disposition     ED Disposition   Admit    Condition   Stable    Date/Time   Fri Jan 27, 2023  8:52 PM    Comment   Case was discussed with Svetlana Gonzáles  and the patient's admission status was agreed to be admitted to [facility] to the service of Dr Haleigh Rich    None         Current Discharge Medication List      CONTINUE these medications which have NOT CHANGED    Details   amLODIPine (NORVASC) 5 mg tablet TAKE 1 TABLET BY MOUTH EVERY DAY  Qty: 30 tablet, Refills: 5    Associated Diagnoses: Hypertensive urgency      aspirin 81 MG tablet Take 81 mg by mouth daily      atorvastatin (LIPITOR) 80 mg tablet Take 1 tablet (80 mg total) by mouth every evening  Qty: 30 tablet, Refills: 5    Associated Diagnoses: Uncontrolled type 2 diabetes mellitus with hyperglycemia (HCC)      metoprolol succinate (TOPROL-XL) 50 mg 24 hr tablet Take 1 tablet (50 mg total) by mouth daily  Qty: 30 tablet, Refills: 5    Associated Diagnoses: Hypertensive urgency      Alcohol Swabs 70 % PADS May substitute brand based on insurance coverage  Check glucose TID  Qty: 100 each, Refills: 0    Associated Diagnoses: Uncontrolled type 2 diabetes mellitus with hyperglycemia (HCC)      ALPRAZolam (XANAX) 0 5 mg tablet Take 1 tablet (0 5 mg total) by mouth daily as needed for anxiety  Qty: 30 tablet, Refills: 0    Associated Diagnoses: Anxiety      furosemide (LASIX) 40 mg tablet Take by mouth      !! glucose blood (Contour Next Test) test strip Use 1 each 3 (three) times a day Use as instructed      !! glucose blood (FREESTYLE TEST STRIPS) test strip May substitute brand based on insurance coverage  Check glucose TID  Qty: 100 each, Refills: 0    Associated Diagnoses: Uncontrolled type 2 diabetes mellitus with hyperglycemia (HCC)      glucose monitoring kit (FREESTYLE) monitoring kit May substitute brand based on insurance coverage  Check glucose TID  Qty: 1 each, Refills: 0    Associated Diagnoses: Uncontrolled type 2 diabetes mellitus with hyperglycemia (HCC)      insulin lispro (HumaLOG KwikPen) 100 units/mL injection pen Inject 6 Units under the skin 3 (three) times a day with meals  Qty: 15 mL, Refills: 3    Associated Diagnoses: Uncontrolled type 2 diabetes mellitus with hypoglycemia without coma ([facility] [address] )      ! ! Insulin Pen Needle (BD Pen Needle Megan 2nd Gen) 32G X 4 MM MISC For use with insulin pen  Pharmacy may dispense brand covered by insurance    Qty: 100 each, Refills: 0 Associated Diagnoses: Uncontrolled type 2 diabetes mellitus with hyperglycemia ([facility] [address] )      ! ! Insulin Pen Needle (BD Pen Needle Megan 2nd Gen) 32G X 4 MM MISC For use with insulin pen  Pharmacy may dispense brand covered by insurance  Qty: 100 each, Refills: 0    Associated Diagnoses: Uncontrolled type 2 diabetes mellitus with hyperglycemia (HCC)      Lantus SoloStar 100 units/mL injection pen INJECT 15 UNITS UNDER THE SKIN DAILY AT BEDTIME  Qty: 15 mL, Refills: 3    Associated Diagnoses: Uncontrolled type 2 diabetes mellitus with hypoglycemia without coma (HCC)      Microlet Lancets MISC USE TO TEST 3 TIMES A DAY  Qty: 100 each, Refills: 10    Associated Diagnoses: Uncontrolled type 2 diabetes mellitus with hyperglycemia (HCC)      nitroglycerin (NITROSTAT) 0 4 mg SL tablet Place 0 4 mg under the tongue as needed      polyethylene glycol (GOLYTELY) 4000 mL solution Take as directed by the office  Qty: 4000 mL, Refills: 0    Associated Diagnoses: Colon cancer screening      Xigduo XR 5-1000 MG TB24 Take 1 tablet by mouth in the morning  Qty: 30 tablet, Refills: 5    Associated Diagnoses: Uncontrolled type 2 diabetes mellitus with hyperglycemia ([facility] [address] )       ! ! - Potential duplicate medications found  Please discuss with provider  No discharge procedures on file      PDMP Review       Value Time User    PDMP Reviewed  Yes 1/27/2023  9:47 PM Shannon Zhou          ED Provider  Electronically Signed by           Sabrina Schroeder MD  01/28/23 6907

## 2023-01-28 LAB
ALBUMIN SERPL BCP-MCNC: 4.1 G/DL (ref 3.5–5)
ALP SERPL-CCNC: 105 U/L (ref 34–104)
ALT SERPL W P-5'-P-CCNC: 21 U/L (ref 7–52)
ANION GAP SERPL CALCULATED.3IONS-SCNC: 11 MMOL/L (ref 4–13)
AST SERPL W P-5'-P-CCNC: 20 U/L (ref 13–39)
BASOPHILS # BLD AUTO: 0.03 THOUSANDS/ÂΜL (ref 0–0.1)
BASOPHILS NFR BLD AUTO: 0 % (ref 0–1)
BILIRUB SERPL-MCNC: 0.74 MG/DL (ref 0.2–1)
BUN SERPL-MCNC: 22 MG/DL (ref 5–25)
CALCIUM SERPL-MCNC: 9 MG/DL (ref 8.4–10.2)
CHLORIDE SERPL-SCNC: 104 MMOL/L (ref 96–108)
CO2 SERPL-SCNC: 24 MMOL/L (ref 21–32)
CREAT SERPL-MCNC: 0.93 MG/DL (ref 0.6–1.3)
EOSINOPHIL # BLD AUTO: 0.06 THOUSAND/ÂΜL (ref 0–0.61)
EOSINOPHIL NFR BLD AUTO: 1 % (ref 0–6)
ERYTHROCYTE [DISTWIDTH] IN BLOOD BY AUTOMATED COUNT: 14.1 % (ref 11.6–15.1)
GFR SERPL CREATININE-BSD FRML MDRD: 66 ML/MIN/1.73SQ M
GLUCOSE SERPL-MCNC: 131 MG/DL (ref 65–140)
GLUCOSE SERPL-MCNC: 143 MG/DL (ref 65–140)
GLUCOSE SERPL-MCNC: 146 MG/DL (ref 65–140)
GLUCOSE SERPL-MCNC: 214 MG/DL (ref 65–140)
GLUCOSE SERPL-MCNC: 214 MG/DL (ref 65–140)
GLUCOSE SERPL-MCNC: 76 MG/DL (ref 65–140)
HCT VFR BLD AUTO: 40.8 % (ref 34.8–46.1)
HGB BLD-MCNC: 13.9 G/DL (ref 11.5–15.4)
IMM GRANULOCYTES # BLD AUTO: 0.03 THOUSAND/UL (ref 0–0.2)
IMM GRANULOCYTES NFR BLD AUTO: 0 % (ref 0–2)
LYMPHOCYTES # BLD AUTO: 1.77 THOUSANDS/ÂΜL (ref 0.6–4.47)
LYMPHOCYTES NFR BLD AUTO: 17 % (ref 14–44)
MAGNESIUM SERPL-MCNC: 2 MG/DL (ref 1.9–2.7)
MCH RBC QN AUTO: 29.6 PG (ref 26.8–34.3)
MCHC RBC AUTO-ENTMCNC: 34.1 G/DL (ref 31.4–37.4)
MCV RBC AUTO: 87 FL (ref 82–98)
MONOCYTES # BLD AUTO: 0.46 THOUSAND/ÂΜL (ref 0.17–1.22)
MONOCYTES NFR BLD AUTO: 4 % (ref 4–12)
NEUTROPHILS # BLD AUTO: 7.99 THOUSANDS/ÂΜL (ref 1.85–7.62)
NEUTS SEG NFR BLD AUTO: 78 % (ref 43–75)
NRBC BLD AUTO-RTO: 0 /100 WBCS
PLATELET # BLD AUTO: 232 THOUSANDS/UL (ref 149–390)
PMV BLD AUTO: 10.5 FL (ref 8.9–12.7)
POTASSIUM SERPL-SCNC: 3.9 MMOL/L (ref 3.5–5.3)
PROT SERPL-MCNC: 7 G/DL (ref 6.4–8.4)
RBC # BLD AUTO: 4.69 MILLION/UL (ref 3.81–5.12)
SODIUM SERPL-SCNC: 139 MMOL/L (ref 135–147)
WBC # BLD AUTO: 10.34 THOUSAND/UL (ref 4.31–10.16)

## 2023-01-28 RX ORDER — METOPROLOL SUCCINATE 25 MG/1
25 TABLET, EXTENDED RELEASE ORAL
Status: DISCONTINUED | OUTPATIENT
Start: 2023-01-28 | End: 2023-01-31 | Stop reason: HOSPADM

## 2023-01-28 RX ADMIN — INSULIN LISPRO 6 UNITS: 100 INJECTION, SOLUTION INTRAVENOUS; SUBCUTANEOUS at 09:05

## 2023-01-28 RX ADMIN — ENOXAPARIN SODIUM 70 MG: 80 INJECTION SUBCUTANEOUS at 17:13

## 2023-01-28 RX ADMIN — METOPROLOL SUCCINATE 50 MG: 50 TABLET, EXTENDED RELEASE ORAL at 08:57

## 2023-01-28 RX ADMIN — FUROSEMIDE 40 MG: 10 INJECTION, SOLUTION INTRAMUSCULAR; INTRAVENOUS at 08:58

## 2023-01-28 RX ADMIN — ASPIRIN 81 MG 81 MG: 81 TABLET ORAL at 08:57

## 2023-01-28 RX ADMIN — INSULIN LISPRO 2 UNITS: 100 INJECTION, SOLUTION INTRAVENOUS; SUBCUTANEOUS at 21:22

## 2023-01-28 RX ADMIN — METOPROLOL SUCCINATE 25 MG: 25 TABLET, EXTENDED RELEASE ORAL at 21:21

## 2023-01-28 RX ADMIN — INSULIN GLARGINE 15 UNITS: 100 INJECTION, SOLUTION SUBCUTANEOUS at 21:23

## 2023-01-28 RX ADMIN — ENOXAPARIN SODIUM 70 MG: 80 INJECTION SUBCUTANEOUS at 08:57

## 2023-01-28 RX ADMIN — AMLODIPINE BESYLATE 5 MG: 5 TABLET ORAL at 08:57

## 2023-01-28 RX ADMIN — DOCUSATE SODIUM 100 MG: 100 CAPSULE, LIQUID FILLED ORAL at 08:57

## 2023-01-28 RX ADMIN — FUROSEMIDE 40 MG: 10 INJECTION, SOLUTION INTRAMUSCULAR; INTRAVENOUS at 17:20

## 2023-01-28 RX ADMIN — ATORVASTATIN CALCIUM 80 MG: 40 TABLET, FILM COATED ORAL at 17:13

## 2023-01-28 RX ADMIN — INSULIN LISPRO 6 UNITS: 100 INJECTION, SOLUTION INTRAVENOUS; SUBCUTANEOUS at 12:36

## 2023-01-28 NOTE — PLAN OF CARE
Problem: Potential for Falls  Goal: Patient will remain free of falls  Description: INTERVENTIONS:  - Educate patient/family on patient safety including physical limitations  - Instruct patient to call for assistance with activity   - Consult OT/PT to assist with strengthening/mobility   - Keep Call bell within reach  - Keep bed low and locked with side rails adjusted as appropriate  - Keep care items and personal belongings within reach  - Initiate and maintain comfort rounds  - Make Fall Risk Sign visible to staff  - Offer Toileting every 2 Hours, in advance of need  - Apply yellow socks and bracelet for high fall risk patients  - Consider moving patient to room near nurses station  Outcome: Progressing     Problem: PAIN - ADULT  Goal: Verbalizes/displays adequate comfort level or baseline comfort level  Description: Interventions:  - Encourage patient to monitor pain and request assistance  - Assess pain using appropriate pain scale  - Administer analgesics based on type and severity of pain and evaluate response  - Implement non-pharmacological measures as appropriate and evaluate response  - Consider cultural and social influences on pain and pain management  - Notify physician/advanced practitioner if interventions unsuccessful or patient reports new pain  Outcome: Progressing     Problem: CARDIOVASCULAR - ADULT  Goal: Maintains optimal cardiac output and hemodynamic stability  Description: INTERVENTIONS:  - Monitor I/O, vital signs and rhythm  - Monitor for S/S and trends of decreased cardiac output  - Administer and titrate ordered vasoactive medications to optimize hemodynamic stability  - Assess quality of pulses, skin color and temperature  - Assess for signs of decreased coronary artery perfusion  - Instruct patient to report change in severity of symptoms  Outcome: Progressing     Problem: RESPIRATORY - ADULT  Goal: Achieves optimal ventilation and oxygenation  Description: INTERVENTIONS:  - Assess for changes in respiratory status  - Assess for changes in mentation and behavior  - Position to facilitate oxygenation and minimize respiratory effort  - Oxygen administered by appropriate delivery if ordered  - Initiate smoking cessation education as indicated  - Encourage broncho-pulmonary hygiene including cough, deep breathe, Incentive Spirometry  - Assess the need for suctioning and aspirate as needed  - Assess and instruct to report SOB or any respiratory difficulty  - Respiratory Therapy support as indicated  Outcome: Progressing

## 2023-01-28 NOTE — PROGRESS NOTES
[address]  Progress Note - Meagan Elizabeth 1960, 58 y o  female MRN: [id number]  Unit/Bed#: -01 Encounter: [id number]  Primary Care Provider: Arlette Cowart MD   Date and time admitted to hospital: 1/27/2023  6:02 PM    Chest pain  Assessment & Plan  · Patient reported mid sternal chest pressure that began with sudden onset GARCIA and SOB  · S/P 325 mg ASA and Nitro paste in ED with resolution of pain  · Hx of Prinzmetal Angina  · Recent stress test 10/2021 with no ischemia noted  · Noted to be tachycardic in the 140s  · Improved after 15 mg IV Cardizem  · Trops negative 9 - 14  · Continue to trend  · Likely due to demand from [facility] CHF vs ACS  · Obtain EKG and notify provider for new/worsening chest pressure/pain  · Monitor on telemetry     Deep vein thrombosis (DVT) of left lower extremity ([address] )  Assessment & Plan  · Patient reports posterior pain in left leg, worse with movement  · Pain begins behind knee/upper calf and runs to mid thigh  · D-dimer 0 87  · Elevated H&H at 17 7 & 52 1  · CTA chest - no evidence of PE  · Concern for acute DVT  · Check LLE duplex to r/o DVT  · Start therapeutic Lovenox pending ultrasound results   · Noted decrease in hgb from 17 7->13 9   Possibly some fluid shifting vs lab error, but no acute bleed suspected    Type 2 diabetes mellitus treated with insulin [facility])  Assessment & Plan  Lab Results   Component Value Date    HGBA1C 7 1 (H) 12/05/2022       Recent Labs     01/27/23  2149 01/28/23  0723 01/28/23  1056   POCGLU 189* 143* 131       Blood Sugar Average: Last 72 hrs:  (P) 923 [id number]     · Hold home Xigduo while in hospital  · Continue home Lantus 15 units HS and Humalog 6 units TID with meals  · Start corrective SSI AC/HS with Accu-checks  · Hypoglycemia protocol     Essential hypertension  Assessment & Plan  · BP stable post acute respiratory distress interventions  · Continue home Amlodipine and Metoprolol  · Monitor BP closely while requiring IV diuretics   · Low salt diet    Nonsustained paroxysmal ventricular tachycardia  Assessment & Plan  · Hx of NPVT  · Follows outpatient with cardiology  · Stress test 10/2022 with no ischemia noted  · 1 run of Vtach noted in ED, self resolved  · ST 140s in ED  · Improved after 15 mg IV Cardizem   · Continue Metoprolol  · Monitor on telemetry     * Acute on chronic combined systolic and diastolic congestive heart failure (HCC)  Assessment & Plan  Wt Readings from Last 3 Encounters:   01/28/23 73 2 kg (161 lb 6 4 oz)   12/07/22 74 4 kg (164 lb)   09/27/22 74 4 kg (164 lb)     · Patient presented with acute onset GARCIA, SOB, and chest pressure  · Reports GARCIA and SOB begin after walking around completing errands  · SOB improves at rest  · CT A/P - bilateral pulmonary edema with trace effusions  · CXR - B/L pulmonary congestion per my read, official read pending   ·   · Per patient, was told to stop PO Lasix a few months ago and to only take PRN for leg swelling  · Echo 5/2021 - LVEF 45-50%, grade 1 DD  · Concern for acute on chronic HF causing pulmonary edema   · S/P 20 mg IV Lasix in ED  · Give additional 20 mg now  · Start 40 mg IV Lasix BID  · Check echo  · 1800 mL Fluid restriction   · Daily weights, I&Os  · Consult Cardiology         VTE Pharmacologic Prophylaxis: VTE Score: 9 Moderate Risk (Score 3-4) - Pharmacological DVT Prophylaxis Ordered: enoxaparin (Lovenox)  Patient Centered Rounds: I performed bedside rounds with nursing staff today  Discussions with Specialists or Other Care Team Provider: N/A    Education and Discussions with Family / Patient: Patient declined call to   Time Spent for Care: 45 minutes  More than 50% of total time spent on counseling and coordination of care as described above      Current Length of Stay: 1 day(s)  Current Patient Status: Inpatient   Certification Statement: The patient will continue to require additional inpatient hospital stay due to CHF exacerbation  Discharge Plan: Anticipate discharge in 24-48 hrs to home  Code Status: Level 1 - Full Code    Subjective:   Seen and examined at bedside  Mariely Melo states she is feeling much better today than she did yesterday  She feels like her breathing is approaching baseline denies any chest pain or productive cough  Does continue to note some left lower extremity pain    Objective:     Vitals:   Temp (24hrs), Av 6 °F (36 4 °C), Min:97 4 °F (36 3 °C), Max:97 7 °F (36 5 °C)    Temp:  [97 4 °F (36 3 °C)-97 7 °F (36 5 °C)] 97 7 °F (36 5 °C)  HR:  [] 63  Resp:  [14-30] 20  BP: ()/() 105/63  SpO2:  [85 %-99 %] 98 %  Body mass index is 27 7 kg/m²  Input and Output Summary (last 24 hours): Intake/Output Summary (Last 24 hours) at 2023 1238  Last data filed at 2023 4119  Gross per 24 hour   Intake 480 ml   Output 450 ml   Net 30 ml       Physical Exam:   Physical Exam  Vitals reviewed  Constitutional:       General: She is not in acute distress  Appearance: She is not ill-appearing  HENT:      Head: Normocephalic  Mouth/Throat:      Mouth: Mucous membranes are moist    Eyes:      Extraocular Movements: Extraocular movements intact  Pupils: Pupils are equal, round, and reactive to light  Cardiovascular:      Rate and Rhythm: Normal rate and regular rhythm  Pulses: Normal pulses  Heart sounds: Normal heart sounds  No murmur heard  Pulmonary:      Effort: Pulmonary effort is normal  No tachypnea, accessory muscle usage or respiratory distress  Breath sounds: No decreased breath sounds, wheezing or rhonchi  Abdominal:      General: Bowel sounds are normal  There is no distension  Palpations: Abdomen is soft  Tenderness: There is no abdominal tenderness  There is no guarding  Musculoskeletal:         General: Normal range of motion  Cervical back: Normal range of motion and neck supple  Right lower leg: No edema  Left lower leg: No edema  Comments: Tenderness to palpation of the left popliteal fossa   Skin:     General: Skin is warm and dry  Capillary Refill: Capillary refill takes less than 2 seconds  Findings: No rash  Neurological:      General: No focal deficit present  Mental Status: She is alert and oriented to person, place, and time  Motor: No weakness     Psychiatric:         Mood and Affect: Mood normal          Behavior: Behavior normal           Additional Data:     Labs:  Results from last 7 days   Lab Units 01/28/23  0448   WBC Thousand/uL 10 34*   HEMOGLOBIN g/dL 13 9   HEMATOCRIT % 40 8   PLATELETS Thousands/uL 232   NEUTROS PCT % 78*   LYMPHS PCT % 17   MONOS PCT % 4   EOS PCT % 1     Results from last 7 days   Lab Units 01/28/23  0448   SODIUM mmol/L 139   POTASSIUM mmol/L 3 9   CHLORIDE mmol/L 104   CO2 mmol/L 24   BUN mg/dL 22   CREATININE mg/dL 0 93   ANION GAP mmol/L 11   CALCIUM mg/dL 9 0   ALBUMIN g/dL 4 1   TOTAL BILIRUBIN mg/dL 0 74   ALK PHOS U/L 105*   ALT U/L 21   AST U/L 20   GLUCOSE RANDOM mg/dL 146*     Results from last 7 days   Lab Units 01/27/23  1831   INR  0 97     Results from last 7 days   Lab Units 01/28/23  1056 01/28/23  0723 01/27/23  2149   POC GLUCOSE mg/dl 131 143* 189*               Lines/Drains:  Invasive Devices     Peripheral Intravenous Line  Duration           Peripheral IV 01/27/23 Left;Ventral (anterior) Forearm <1 day                  Telemetry:  Telemetry Orders (From admission, onward)             48 Hour Telemetry Monitoring  Continuous x 48 hours        References:    Telemetry Guidelines   Question:  Reason for 48 Hour Telemetry  Answer:  Arrhythmias Requiring Medical Therapy (eg  SVT, Vtach/fib, Bradycardia, Uncontrolled A-fib)                 Telemetry Reviewed: Normal Sinus Rhythm  Indication for Continued Telemetry Use: Arrthymias requiring medical therapy             Imaging: Reviewed radiology reports from this admission including: chest CT scan    Recent Cultures (last 7 days):   Results from last 7 days   Lab Units 01/27/23  1940 01/27/23  1831   BLOOD CULTURE  Received in Microbiology Lab  Culture in Progress  Received in Microbiology Lab  Culture in Progress  Last 24 Hours Medication List:   Current Facility-Administered Medications   Medication Dose Route Frequency Provider Last Rate   • acetaminophen  650 mg Oral Q6H PRN Arvella Born, EMANUEL     • ALPRAZolam  0 5 mg Oral Daily PRN Arvella BornEMANUEL     • aluminum-magnesium hydroxide-simethicone  30 mL Oral Q6H PRN Arvella BornEMANUEL     • amLODIPine  5 mg Oral Daily [facility], [address]     • aspirin  81 mg Oral Daily [facility], [address]     • atorvastatin  80 mg Oral QPM EMANUEL Almendarez     • docusate sodium  100 mg Oral BID Arvella BornEMANUEL     • enoxaparin  1 mg/kg Subcutaneous BID [facility] EMANUEL Darling     • furosemide  40 mg Intravenous BID Arvella BornEMANUEL     • insulin glargine  15 Units Subcutaneous HS EMANUEL Song     • insulin lispro  1-5 Units Subcutaneous TID AC EMANUEL Almendarez     • insulin lispro  1-5 Units Subcutaneous HS [place] EMANUEL Amaya     • insulin lispro  6 Units Subcutaneous TID With Meals EMANUEL Quintero     • metoprolol succinate  50 mg Oral Daily [facility], [address]     • nitroglycerin  0 4 mg Sublingual Q5 Min PRN Arvella Born, [address]          Today, Patient Was Seen By: Ryan Recio    **Please Note: This note may have been constructed using a voice recognition system  **

## 2023-01-28 NOTE — ASSESSMENT & PLAN NOTE
Wt Readings from Last 3 Encounters:   01/28/23 73 2 kg (161 lb 6 4 oz)   12/07/22 74 4 kg (164 lb)   09/27/22 74 4 kg (164 lb)     · Patient presented with acute onset GARCIA, SOB, and chest pressure  · Reports GARCIA and SOB begin after walking around completing errands  · SOB improves at rest  · CT A/P - bilateral pulmonary edema with trace effusions  · CXR - B/L pulmonary congestion per my read, official read pending   ·   · Per patient, was told to stop PO Lasix a few months ago and to only take PRN for leg swelling  · Echo 5/2021 - LVEF 45-50%, grade 1 DD  · Concern for acute on chronic HF causing pulmonary edema   · S/P 20 mg IV Lasix in ED  · Give additional 20 mg now  · Start 40 mg IV Lasix BID  · Check echo  · 1800 mL Fluid restriction   · Daily weights, I&Os  · Consult Cardiology

## 2023-01-28 NOTE — H&P
[facility]  66   H&P- Areajorgito Hopewell 1960, 58 y o  female MRN: [id number]  Unit/Bed#: -01 Encounter: [id number]  Primary Care Provider: Dontrell Campos MD   Date and time admitted to hospital: 1/27/2023  6:02 PM    * Acute on chronic combined systolic and diastolic congestive heart failure ([facility] )  Assessment & Plan  Wt Readings from Last 3 Encounters:   01/27/23 73 9 kg (163 lb 0 4 oz)   12/07/22 74 4 kg (164 lb)   09/27/22 74 4 kg (164 lb)     · Patient presented with acute onset AGRCIA, SOB, and chest pressure  · Reports GARCIA and SOB begin after walking around completing errands  · SOB improves at rest  · CT A/P - bilateral pulmonary edema with trace effusions  · CXR - B/L pulmonary congestion per my read, official read pending   ·   · Per patient, was told to stop PO Lasix a few months ago and to only take PRN for leg swelling  · Echo 5/2021 - LVEF 45-50%, grade 1 DD  · Concern for acute on chronic HF causing pulmonary edema   · S/P 20 mg IV Lasix in ED  · Give additional 20 mg now  · Start 40 mg IV Lasix BID  · Check echo  · 1800 mL Fluid restriction   · Daily weights, I&Os  · Consult Cardiology     Deep vein thrombosis (DVT) of left lower extremity ([facility] )  Assessment & Plan  · Patient reports posterior pain in left leg, worse with movement  · Pain begins behind knee/upper calf and runs to mid thigh  · D-dimer 0 87  · Elevated H&H at 17 7 & 52 1  · CTA chest - no evidence of PE  · Concern for acute DVT  · Check LLE duplex to r/o DVT  · Start therapeutic Lovenox pending ultrasound results     Chest pain  Assessment & Plan  · Patient reported mid sternal chest pressure that began with sudden onset GARCIA and SOB  · S/P 325 mg ASA and Nitro paste in ED with resolution of pain  · Hx of Prinzmetal Angina  · Recent stress test 10/2021 with no ischemia noted  · Noted to be tachycardic in the 140s  · Improved after 15 mg IV Cardizem  · Trops negative 9 - 14  · Continue to trend  · Likely due to demand from [employer] CHF vs ACS  · Obtain EKG and notify provider for new/worsening chest pressure/pain  · Monitor on telemetry     Type 2 diabetes mellitus treated with insulin [facility])  Assessment & Plan  Lab Results   Component Value Date    HGBA1C 7 1 (H) 12/05/2022       Recent Labs     01/27/23  2149   POCGLU 189*       Blood Sugar Average: Last 72 hrs:  (P) 189     · Hold home Xigduo while in hospital  · Continue home Lantus 15 units HS and Humalog 6 units TID with meals  · Start corrective SSI AC/HS with Accu-checks  · Hypoglycemia protocol     Essential hypertension  Assessment & Plan  · BP stable post acute respiratory distress interventions  · Continue home Amlodipine and Metoprolol  · Monitor BP closely while requiring IV diuretics   · Low salt diet    Nonsustained paroxysmal ventricular tachycardia  Assessment & Plan  · Hx of NPVT  · Follows outpatient with cardiology  · Stress test 10/2022 with no ischemia noted  · 1 run of Vtach noted in ED, self resolved  · ST 140s in ED  · Improved after 15 mg IV Cardizem   · Continue Metoprolol  · Monitor on telemetry     VTE Pharmacologic Prophylaxis: VTE Score: 9 High Risk (Score >/= 5) - Pharmacological DVT Prophylaxis Ordered: enoxaparin (Lovenox)  Sequential Compression Devices Ordered  Code Status: Level 1 - Full Code   Discussion with family: Updated  (son) at bedside  Anticipated Length of Stay: Patient will be admitted on an inpatient basis with an anticipated length of stay of greater than 2 midnights secondary to acute on chronic heart failure requiring IV medication and close laboratory monitoring  Total Time for Visit, including Counseling / Coordination of Care: 60 minutes Greater than 50% of this total time spent on direct patient counseling and coordination of care      Chief Complaint: Shortness of breath    History of Present Illness:  Yudelka Morgan is a 58 y o  female with a PMH of cardiomyopathy with combined heart failure, T2DM, hypertension, NPVT who presents with shortness of breath  Per patient, she was walking around after work completing errands when she became short of breath  She noticed her breathing was worse when she moved around and improved slightly when she sat still  Patient also reports midsternal chest pressure that began soon after the GARCIA  Patient presented immediately to the ED after noticing the chest pressure  Patient reports her breathing feels similar to previous heart failure exacerbations  Per patient, she was taken off her daily Lasix and instructed to take it only when she noticed leg swelling which she denies noticing  Patient also reports pain in the back of her left leg that starts top of her calf, runs behind her knee, and up to mid thigh  Patient denies any fever, chills, headaches, abdominal pain, N/V/D, or urinary complaints  In the ED, her laboratory evaluation was remarkable for an elevated BNP of 342, elevated hemoglobin of 17 7, elevated hematocrit at 52 1, elevated D-dimer at 0 87  She was also noted to be satting 90% on room air and was placed on nasal cannula  She was noted to be tachycardic in the 140s and hypertensive with an EKG that showed a tachycardic arrhythmia that improved after receiving 15 mg of IV Cardizem  Chest x-ray was completed that showed possible pulmonary congestion she was given 20 mg of IV Lasix  A CT PE study was completed that showed no evidence of a PE and showed bilateral pulmonary edema  She was admitted for further work-up and treatment of her acute on chronic heart failure exacerbation  Review of Systems:  Review of Systems   Constitutional: Negative for chills, diaphoresis, fatigue and fever  HENT: Negative for sinus pain, sore throat and trouble swallowing  Respiratory: Positive for shortness of breath  Negative for cough, chest tightness and wheezing  Cardiovascular: Positive for palpitations  Negative for chest pain and leg swelling  Gastrointestinal: Negative for abdominal pain, diarrhea, nausea and vomiting  Genitourinary: Negative for difficulty urinating, dysuria and frequency  Musculoskeletal: Positive for myalgias (L leg)  Negative for arthralgias and gait problem  Skin: Negative for color change  Neurological: Negative for dizziness, light-headedness, numbness and headaches  Past Medical and Surgical History:   Past Medical History:   Diagnosis Date   • Abdominal pain    • Anxiety disorder    • Diabetes mellitus ([facility] )    • Dizziness    • Heart failure (HCC)    • High cholesterol    • Hypertension    • Lightheadedness    • Palpitations    • SOB (shortness of breath)        History reviewed  No pertinent surgical history  Meds/Allergies:  Prior to Admission medications    Medication Sig Start Date End Date Taking? Authorizing Provider   Alcohol Swabs 70 % PADS May substitute brand based on insurance coverage  Check glucose TID  5/28/21   Hilton Larose DO   ALPRAZolam Susie Olp) 0 5 mg tablet Take 1 tablet (0 5 mg total) by mouth daily as needed for anxiety 12/7/22   Tala Oneal MD   amLODIPine (NORVASC) 5 mg tablet TAKE 1 TABLET BY MOUTH EVERY DAY 12/27/22   Tala Oneal MD   aspirin 81 MG tablet Take 81 mg by mouth daily 10/28/13   Historical Provider, MD   atorvastatin (LIPITOR) 80 mg tablet Take 1 tablet (80 mg total) by mouth every evening 9/27/22   Tala Oneal MD   furosemide (LASIX) 40 mg tablet Take by mouth    Historical Provider, MD   glucose blood (Contour Next Test) test strip Use 1 each 3 (three) times a day Use as instructed    Historical Provider, MD   glucose blood (FREESTYLE TEST STRIPS) test strip May substitute brand based on insurance coverage  Check glucose TID  5/28/21   Hilton Larose DO   glucose monitoring kit (FREESTYLE) monitoring kit May substitute brand based on insurance coverage   Check glucose TID  5/28/21   Hilton Larose DO   insulin lispro (HumaLOG KwikPen) 100 units/mL injection pen Inject 6 Units under the skin 3 (three) times a day with meals 6/16/21   Denis Guzman MD   Insulin Pen Needle (BD Pen Needle Megan 2nd Gen) 32G X 4 MM MISC For use with insulin pen  Pharmacy may dispense brand covered by insurance  5/28/21   Syd Paul DO   Insulin Pen Needle (BD Pen Needle Megan 2nd Gen) 32G X 4 MM MISC For use with insulin pen  Pharmacy may dispense brand covered by insurance  5/28/21   Syd Paul DO   Lantus SoloStar 100 units/mL injection pen INJECT 15 UNITS UNDER THE SKIN DAILY AT BEDTIME 4/20/22   Denis Guzman MD   metoprolol succinate (TOPROL-XL) 50 mg 24 hr tablet Take 1 tablet (50 mg total) by mouth daily 10/17/22   Galdino Cespedes MD   Microlet Lancets MISC USE TO TEST 3 TIMES A DAY 9/2/22   Galdino Cespedes MD   nitroglycerin (NITROSTAT) 0 4 mg SL tablet Place 0 4 mg under the tongue as needed 11/20/13   Historical Provider, MD   polyethylene glycol (GOLYTELY) 4000 mL solution Take as directed by the office  1/11/23   Natacha Brady PA-C   Xigduo XR 5-1000 MG TB24 Take 1 tablet by mouth in the morning 9/27/22 12/7/22  Galdino Cespedes MD     I have reviewed home medications with patient personally  Allergies: No Known Allergies    Social History:  Marital Status: Legally    Occupation:   Patient Pre-hospital Living Situation: Home  Patient Pre-hospital Level of Mobility: walks  Patient Pre-hospital Diet Restrictions: Low carb  Substance Use History:   Social History     Substance and Sexual Activity   Alcohol Use Yes    Comment: social     Social History     Tobacco Use   Smoking Status Former   Smokeless Tobacco Former     Social History     Substance and Sexual Activity   Drug Use Never       Family History:  History reviewed  No pertinent family history      Physical Exam:     Vitals:   Blood Pressure: 147/75 (01/27/23 2150)  Pulse: 77 (01/27/23 2150)  Temperature: 97 5 °F (36 4 °C) (01/27/23 2150)  Temp Source: Tympanic (01/27/23 2150)  Respirations: 16 (01/27/23 2150)  Height: 5' 4" (162 6 cm) (01/27/23 2150)  Weight - Scale: 73 9 kg (163 lb 0 4 oz) (01/27/23 2150)  SpO2: 97 % (01/27/23 2150)    Physical Exam  Vitals reviewed  Constitutional:       General: She is not in acute distress  Appearance: She is not ill-appearing  HENT:      Head: Normocephalic  Nose: Congestion present  Mouth/Throat:      Mouth: Mucous membranes are moist    Eyes:      Extraocular Movements: Extraocular movements intact  Pupils: Pupils are equal, round, and reactive to light  Cardiovascular:      Rate and Rhythm: Normal rate and regular rhythm  Pulses: Normal pulses  Heart sounds: Normal heart sounds  No murmur heard  Pulmonary:      Effort: Pulmonary effort is normal  No tachypnea, accessory muscle usage or respiratory distress  Breath sounds: Examination of the right-lower field reveals rhonchi  Examination of the left-lower field reveals rhonchi  Rhonchi present  No decreased breath sounds or wheezing  Abdominal:      General: Bowel sounds are normal  There is no distension  Palpations: Abdomen is soft  Tenderness: There is no abdominal tenderness  There is no guarding  Musculoskeletal:         General: Normal range of motion  Cervical back: Normal range of motion and neck supple  Right lower leg: No edema  Left lower leg: No edema  Skin:     General: Skin is warm and dry  Capillary Refill: Capillary refill takes less than 2 seconds  Coloration: Skin is not pale  Findings: No erythema  Comments: Warmth noted posterior left knee   Neurological:      General: No focal deficit present  Mental Status: She is alert and oriented to person, place, and time  Motor: No weakness     Psychiatric:         Mood and Affect: Mood normal          Behavior: Behavior normal           Additional Data:     Lab Results:  Results from last 7 days   Lab Units 01/27/23  1831   WBC Thousand/uL 11 47*   HEMOGLOBIN g/dL 17 7*   HEMATOCRIT % 52 1*   PLATELETS Thousands/uL 313   NEUTROS PCT % 61   LYMPHS PCT % 33   MONOS PCT % 4   EOS PCT % 2     Results from last 7 days   Lab Units 01/27/23  1831   SODIUM mmol/L 138   POTASSIUM mmol/L 3 6   CHLORIDE mmol/L 103   CO2 mmol/L 22   BUN mg/dL 18   CREATININE mg/dL 0 89   ANION GAP mmol/L 13   CALCIUM mg/dL 9 9   ALBUMIN g/dL 5 0   TOTAL BILIRUBIN mg/dL 0 71   ALK PHOS U/L 137*   ALT U/L 26   AST U/L 25   GLUCOSE RANDOM mg/dL 159*     Results from last 7 days   Lab Units 01/27/23  1831   INR  0 97     Results from last 7 days   Lab Units 01/27/23  2149   POC GLUCOSE mg/dl 189*               Lines/Drains:  Invasive Devices     Peripheral Intravenous Line  Duration           Peripheral IV 01/27/23 Left;Ventral (anterior) Forearm <1 day                    Imaging: Reviewed radiology reports from this admission including: chest CT scan and Personally reviewed the following imaging: chest xray  CTA ED chest PE Study   Final Result by Jennifer Hughes MD (01/27 2024)      No pulmonary embolus  Extensive septal thickening and groundglass opacity due to interstitial and alveolar edema with trace effusions  Workstation performed: [id number]         XR chest portable   ED Interpretation by Surekha Sandy MD (01/27 2029)   CXR; bilateral pulmonary congestion c/w mild pulmonary edema  VAS reflux lower limb venous duplex study with reflux assesment, unilateral    (Results Pending)       EKG and Other Studies Reviewed on Admission:   · EKG: Sinus Tachycardia    ** Please Note: This note has been constructed using a voice recognition system   **

## 2023-01-28 NOTE — ASSESSMENT & PLAN NOTE
Lab Results   Component Value Date    HGBA1C 7 1 (H) 12/05/2022       Recent Labs     01/27/23  2149 01/28/23  0723 01/28/23  1056   POCGLU 189* 143* 131       Blood Sugar Average: Last 72 hrs:  (P) 458 [id number]     · Hold home Xigduo while in hospital  · Continue home Lantus 15 units HS and Humalog 6 units TID with meals  · Start corrective SSI AC/HS with Accu-checks  · Hypoglycemia protocol

## 2023-01-28 NOTE — ASSESSMENT & PLAN NOTE
· Hx of NPVT  · Follows outpatient with cardiology  · Stress test 10/2022 with no ischemia noted  · 1 run of Vtach noted in ED, self resolved  · ST 140s in ED  · Improved after 15 mg IV Cardizem   · Continue Metoprolol  · Monitor on telemetry

## 2023-01-28 NOTE — ASSESSMENT & PLAN NOTE
· Patient reports posterior pain in left leg, worse with movement  · Pain begins behind knee/upper calf and runs to mid thigh  · D-dimer 0 87  · Elevated H&H at 17 7 & 52 1  · CTA chest - no evidence of PE  · Concern for acute DVT  · Check LLE duplex to r/o DVT  · Start therapeutic Lovenox pending ultrasound results   · Noted decrease in hgb from 17 7->13 9   Possibly some fluid shifting vs lab error, but no acute bleed suspected

## 2023-01-28 NOTE — ASSESSMENT & PLAN NOTE
· Patient reported mid sternal chest pressure that began with sudden onset GARCIA and SOB  · S/P 325 mg ASA and Nitro paste in ED with resolution of pain  · Hx of Prinzmetal Angina  · Recent stress test 10/2021 with no ischemia noted  · Noted to be tachycardic in the 140s  · Improved after 15 mg IV Cardizem  · Trops negative 9 - 14  · Continue to trend  · Likely due to demand from [facility] CHF vs ACS  · Obtain EKG and notify provider for new/worsening chest pressure/pain  · Monitor on telemetry

## 2023-01-28 NOTE — CONSULTS
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%   3  Acute hypoxic respiratory failure secondary to #1    4  Benign essential hypertension  5  Dyslipidemia   6  DM type 2   7  NSVT        Assessment/ Plan:  Ms Chaparro Brown is a pleasant 51-year-old female with a known history of HFmEF with a presumed viral cardiomyopathy admitted with shortness of breath and chest pressure  She remains volume overloaded on exam   IV diuretics have been initiated and should be continued for a net negative fluid balance  No accurate urine output has been recorded and this was discussed with nursing  Her weight has decreased since admission  She weighed 164 pounds at her primary care provider visit in December  She does not necessarily abide by a salt restricted diet  Dietary education was provided  An updated echocardiogram has been requested and will be reviewed  Per the stress echo she was noted to have an ejection fraction of 45 to 50% back in October 2022, stable from a prior in our system in 2021  Otherwise her blood pressure is well controlled on her current antihypertensive medication regimen  She is maintained on high intensity statin therapy in the setting of her diabetes  She has not had any recurrent arrhythmias noted on telemetry  Her ECG from admission appears to reveal atrial tachycardia  There was reported atrial fibrillation noted on her stress echocardiogram from [facility]  She would benefit from ambulatory rhythm monitoring with a Zio patch as an outpatient    I will add 25 mg of metoprolol succinate at bedtime and discontinue amlodipine  I suspect her chest pain upon presentation was secondary to her heart failure  She did have a stress echocardiogram last year with report per [facility] Everywhere  She did have an arrhythmia at peak exercise and the test was deemed indeterminant although some views revealed improved and hyperdynamic LV function  She has had episodes of chest pain with exertion improved with rest or nitroglycerin  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  Should she have ongoing symptoms per her cardiologist's documentation with which I agree further evaluation with a cardiac catheterization could be undertaken  Imdur can be added to her medication regimen if her blood pressure allows  She will need standing doses of oral diuretics upon discharge and close outpatient follow-up with Dr Jero Diez  HPI: Roscoe Schreiber is a 58y o  year old female who has a history of HFmEF, nonischemic cardiomyopathy, hypertension and dyslipidemia admitted with shortness of breath  She states she was in her usual state of health until Friday  She was out shopping after work running errands and she had noted that she was very short of breath, felt palpitations and had some chest pressure  Because of ongoing symptoms she presented to the emergency department for further evaluation and management  She was noted to be tachycardic upon arrival with heart rate in the 140s  She was hypertensive and hypoxic on room air  She was placed on oxygen, given IV Cardizem and Nitropaste applied  She states that eventually once her breathing improved the chest pressure resolved and has not recurred  She underwent a CT scan of her chest as well due to an elevated D-dimer  This revealed no evidence of a pulmonary embolism but extensive septal thickening and groundglass opacities due to interstitial and alveolar edema with trace effusions      Prior to admission she denies any lower extremity edema, paroxysmal nocturnal dyspnea, orthopnea, acute weight gain or increasing abdominal girth  She does not weigh herself on a regular basis but states that she sees her primary physician frequently with no weight gain  She does not abide by a salt restricted diet  She states that she cooks with salt and does eat things such as deli meat  She was on standing diuretics in the past but states about two years ago her primary cardiologist discontinued these and she was told to take these as needed  She has not had to do so in the recent past   She does report palpitations on a nightly basis described as a rapid and forceful heartbeat which last about 5 minutes with a sudden onset and offset  She sees Dr Nayeli Estes through [facility]  She states back in October she had an episode of chest discomfort while bowling which resolved with rest and nitroglyercin  Hence she underwent a stress echocardiogram   Her baseline EF was noted to be 45-50%  There was reported possible atrial fibrillation during stress and the test was deemed indeterminant due to the arrhythmia noted with peak exercise although it was noted in apical views function appeared improved and hyperdynamic  She states that she has had one further episode of chest pain with exertion which occurred months ago when she was pushing a shopping cart  It improved after two nitroglycerin  She states a few weeks ago she had an episode at rest necessitating nitroglycerin use  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 and can complete her job activities without any exertional symptoms  She also had a heart catheterization in 2016 when she was first diagnosed with her cardiomyopathy which did not reveal any evidence of obstructive coronary disease  ROS:  Positive as per the HPI, all other systems were reviewed and negative      Historical Information   Past Medical History:   Diagnosis Date   • Abdominal pain    • Anxiety disorder    • Diabetes mellitus ([address] )    • Dizziness    • Heart failure (HCC)    • High cholesterol    • Hypertension    • Lightheadedness    • Palpitations    • SOB (shortness of breath)      History reviewed  No pertinent surgical history  Social History     Substance and Sexual Activity   Alcohol Use Yes    Comment: social     Social History     Substance and Sexual Activity   Drug Use Never     Social History     Tobacco Use   Smoking Status Former   Smokeless Tobacco Former     Family History: non-contributory    Meds/Allergies   all current active meds have been reviewed       No Known Allergies    Objective   Vitals: Blood pressure 116/64, pulse 75, temperature 97 7 °F (36 5 °C), temperature source Oral, resp  rate 16, height 5' 4" (1 626 m), weight 73 2 kg (161 lb 6 4 oz), SpO2 99 %  , Body mass index is 27 7 kg/m² ,   Orthostatic Blood Pressures    Flowsheet Row Most Recent Value   Blood Pressure 116/64 filed at 01/28/2023 0857   Patient Position - Orthostatic VS Lying filed at 01/28/2023 5019        Systolic (92VBG), SOB:026 , Min:98 , KOB:648     Diastolic (54ZEP), LRT:00, Min:58, Max:114      Intake/Output Summary (Last 24 hours) at 1/28/2023 1018  Last data filed at 1/28/2023 0924  Gross per 24 hour   Intake 480 ml   Output 450 ml   Net 30 ml       Weight (last 2 days)     Date/Time Weight    01/28/23 0600 73 2 (161 4)    01/27/23 2150 73 9 (163 03)    01/27/23 1856 74 4 (164)          Invasive Devices     Peripheral Intravenous Line  Duration           Peripheral IV 01/27/23 Left;Ventral (anterior) Forearm <1 day                    Physical Exam: /63 (BP Location: Left arm)   Pulse 63   Temp 97 7 °F (36 5 °C) (Oral)   Resp 20   Ht 5' 4" (1 626 m)   Wt 73 2 kg (161 lb 6 4 oz)   SpO2 98%   BMI 27 70 kg/m²     General Appearance:    Alert, cooperative, no distress, appears stated age   Head:    Normocephalic, without obvious abnormality, atraumatic   Eyes:    PERRL, conjunctiva/corneas clear, EOM's intact, fundi benign, both eyes   Throat:   Lips, mucosa, and tongue normal; teeth and gums normal   Neck:   Supple, symmetrical, trachea midline, no adenopathy;     thyroid:  no enlargement/tenderness/nodules; no carotid    Bruit, elevated JVP   Back:     Symmetric, no curvature, ROM normal, no CVA tenderness   Lungs:     Bibasilar Rales   Chest Wall:    No tenderness or deformity    Heart:    Regular rate and rhythm, S1 and S2 normal, no murmur   Abdomen:     Soft, non-tender, bowel sounds active all four quadrants,     no masses, no organomegaly   Extremities:   Extremities normal, atraumatic, no cyanosis or edema   Pulses:   2+ and symmetric all extremities   Skin:   Skin color, texture, turgor normal, no rashes or lesions   Lymph nodes:   Cervical, supraclavicular, and axillary nodes normal   Neurologic:   CNII-XII intact, normal strength, sensation and reflexes     throughout           Laboratory Results:        CBC with diff:   Results from last 7 days   Lab Units 01/28/23  0448 01/27/23  1831   WBC Thousand/uL 10 34* 11 47*   HEMOGLOBIN g/dL 13 9 17 7*   HEMATOCRIT % 40 8 52 1*   MCV fL 87 88   PLATELETS Thousands/uL 232 313   MCH pg 29 6 29 8   MCHC g/dL 34 1 34 0   RDW % 14 1 14 0   MPV fL 10 5 10 1   NRBC AUTO /100 WBCs 0 0         CMP:  Results from last 7 days   Lab Units 01/28/23  0448 01/27/23  1831   POTASSIUM mmol/L 3 9 3 6   CHLORIDE mmol/L 104 103   CO2 mmol/L 24 22   BUN mg/dL 22 18   CREATININE mg/dL 0 93 0 89   CALCIUM mg/dL 9 0 9 9   AST U/L 20 25   ALT U/L 21 26   ALK PHOS U/L 105* 137*   EGFR ml/min/1 73sq m 66 69         BMP:  Results from last 7 days   Lab Units 01/28/23  0448 01/27/23  1831   POTASSIUM mmol/L 3 9 3 6   CHLORIDE mmol/L 104 103   CO2 mmol/L 24 22   BUN mg/dL 22 18   CREATININE mg/dL 0 93 0 89   CALCIUM mg/dL 9 0 9 9       BNP:    Recent Labs     01/27/23  1831   *       Magnesium:   Results from last 7 days   Lab Units 01/28/23  0448   MAGNESIUM mg/dL 2 0       Coags:   Results from last 7 days   Lab Units 23  1831   PTT seconds 27   INR  0 97     Cardiac testing:   Results for orders placed during the hospital encounter of 21    Echo complete with contrast if indicated    Narrative  [facility] [address] [facility]  [phone]    Transthoracic Echocardiogram  2D, M-mode, Doppler, and Color Doppler    Study date:  26-May-2021    Patient: Mela Snell  MR number: [id number]  Account number: [de-identified]  : 1960  Age: 64 years  Gender: Female  Status: Inpatient  Location: Bedside  Height: 65 in  Weight: 149 6 lb  BP: 115/ 67 mmHg    Indications: TIA    Diagnoses: G45 9 - Transient cerebral ischemic attack, unspecified    Sonographer:  Myrtle Michel RDCS  Primary Physician:  Arlette Rust MD  Referring Physician:  Carlo Bal PA-C  Group:  [facility]  Interpreting Physician:  Will Newman MD    IMPRESSIONS:  There was no echocardiographic evidence for a cardiac source of embolism  SUMMARY    LEFT VENTRICLE:  Systolic function was mildly reduced  Ejection fraction was estimated in the range of 45 % to 50 %  Wall thickness was mildly increased  Doppler parameters were consistent with abnormal left ventricular relaxation (grade 1 diastolic dysfunction)  No evidence of apical thrombus  RIGHT VENTRICLE:  Systolic function was mildly reduced  LEFT ATRIUM:  No thrombus was identified  ATRIAL SEPTUM:  No defect or patent foramen ovale was identified  MITRAL VALVE:  There was trace regurgitation  AORTIC VALVE:  There was no evidence for stenosis  HISTORY: PRIOR HISTORY: DM2, HTN, HF    PROCEDURE: The procedure was performed at the bedside  This was a routine study  The transthoracic approach was used  The study included complete 2D imaging, M-mode, complete spectral Doppler, and color Doppler  The heart rate was 67 bpm,  at the start of the study   Images were obtained from the parasternal, apical, subcostal, and suprasternal notch acoustic windows  Image quality was adequate  LEFT VENTRICLE: Size was normal  Systolic function was mildly reduced  Ejection fraction was estimated in the range of 45 % to 50 %  Wall thickness was mildly increased  No evidence of apical thrombus  DOPPLER: Doppler parameters were  consistent with abnormal left ventricular relaxation (grade 1 diastolic dysfunction)  RIGHT VENTRICLE: The size was normal  Systolic function was mildly reduced  Wall thickness was normal     LEFT ATRIUM: Size was normal  No thrombus was identified  ATRIAL SEPTUM: No defect or patent foramen ovale was identified  RIGHT ATRIUM: Size was normal     MITRAL VALVE: Valve structure was normal  There was normal leaflet separation  DOPPLER: The transmitral velocity was within the normal range  There was no evidence for stenosis  There was trace regurgitation  AORTIC VALVE: The valve was trileaflet  Leaflets exhibited normal thickness and normal cuspal separation  DOPPLER: Transaortic velocity was within the normal range  There was no evidence for stenosis  There was no significant  regurgitation  TRICUSPID VALVE: The valve structure was normal  There was normal leaflet separation  DOPPLER: The transtricuspid velocity was within the normal range  There was no evidence for stenosis  There was no significant regurgitation  PULMONIC VALVE: Not well visualized  DOPPLER: The transpulmonic velocity was within the normal range  There was no significant regurgitation  PERICARDIUM: There was no pericardial effusion  The pericardium was normal in appearance  AORTA: The root exhibited normal size  SYSTEMIC VEINS: IVC: The inferior vena cava was normal in size   Respirophasic changes were normal     SYSTEM MEASUREMENT TABLES    2D  %FS: 11 09 %  Ao Diam: 2 68 cm  EDV(Teich): 116 38 ml  EF Biplane: 50 01 %  EF(Teich): 24 03 %  ESV(Teich): 88 41 ml  IVSd: 1 1 cm  LA Diam: 3 74 cm  LAAs A4C: 16 69 cm2  LAESV A-L A4C: 55 67 ml  LAESV MOD A4C: 52 68 ml  LALs A4C: 4 25 cm  LVEDV MOD A2C: 87 82 ml  LVEDV MOD A4C: 86 17 ml  LVEDV MOD BP: 87 88 ml  LVEDVInd MOD BP: 50 21 ml/m2  LVEF MOD A2C: 51 89 %  LVEF MOD A4C: 46 69 %  LVESV MOD A2C: 42 25 ml  LVESV MOD A4C: 45 94 ml  LVESV MOD BP: 43 93 ml  LVESVInd MOD BP: 25 1 ml/m2  LVIDd: 4 97 cm  LVIDs: 4 42 cm  LVLd A2C: 8 08 cm  LVLd A4C: 7 9 cm  LVLs A2C: 6 95 cm  LVLs A4C: 6 95 cm  LVPWd: 0 86 cm  RVIDd: 2 68 cm  SV MOD A2C: 45 56 ml  SV MOD A4C: 40 23 ml  SV(Teich): 27 96 ml    CW  AV Env  Ti: 338 73 ms  AV MaxP 3 mmHg  AV VTI: 30 18 cm  AV Vmax: 1 15 m/s  AV Vmean: 0 89 m/s  AV meanPG: 3 52 mmHg    MM  TAPSE: 1 36 cm    PW  E' Sept: 0 05 m/s  E/E' Sept: 15 03  LVOT Env  Ti: 300 67 ms  LVOT VTI: 14 82 cm  LVOT Vmax: 0 73 m/s  LVOT Vmean: 0 49 m/s  LVOT maxP 11 mmHg  LVOT meanP 12 mmHg  MV A [place]: 1 15 m/s  MV Dec [place]: 3 02 m/s2  MV DecT: 226 94 ms  MV E Smith: 0 69 m/s  MV E/A Ratio: 0 59  MV PHT: 65 81 ms  MVA By PHT: 3 34 cm2    [employer] Accredited Echocardiography Laboratory    Prepared and electronically signed by    Deo Chin MD  Signed 26-May-2021 14:39:30    No results found for this or any previous visit  No results found for this or any previous visit  No results found for this or any previous visit  Imaging: I have personally reviewed pertinent reports  XR chest portable    Result Date: 2023  Narrative: CHEST INDICATION:   sob  COMPARISON:  2014 EXAM PERFORMED/VIEWS:  XR CHEST PORTABLE FINDINGS: Cardiomediastinal silhouette appears borderline enlarged with slight vascular accentuation  No pneumothorax or pleural effusion  Osseous structures appear within normal limits for patient age  Impression: Borderline cardiomegaly   Slight vascular congestion Workstation performed: [id number]     CTA ED chest PE Study    Result Date: 2023  Narrative: CTA - CHEST WITH IV CONTRAST - PULMONARY ANGIOGRAM INDICATION:   r/o PE  Per my review of the medical record, the patient presents with sudden onset shortness of breath and chest pain prior to admission, chest tightness  Tachycardia  Coughing but no fever  Diaphoretic  COMPARISON: CXR from today and from 2/4/2014  TECHNIQUE: CT angiogram timed for optimal opacification of the pulmonary arteries  Axial, sagittal, and coronal 2D reformats created from source data  Coronal 3D MIP postprocessing on the acquisition scanner  Radiation dose length product (DLP):  270 4 mGy-cm   Radiation dose exposure minimized using iterative reconstruction and automated exposure control  IV Contrast:  100 mL of iohexol (OMNIPAQUE)  FINDINGS: PULMONARY ARTERIES:  No pulmonary embolus  LUNGS:  Extensive septal thickening and extensive groundglass opacity AIRWAYS: No significant filling defects  PLEURA:  Trace effusions  HEART/GREAT VESSELS:  Normal for age  MEDIASTINUM AND SHEKHAR:  Small hiatal hernia  CHEST WALL AND LOWER NECK: Unremarkable  UPPER ABDOMEN:  Punctate nonobstructing bilateral renal calcification  OSSEOUS STRUCTURES:  Mild degenerative disease in the spine with mild curvature of the cervicothoracic junction  Impression: No pulmonary embolus  Extensive septal thickening and groundglass opacity due to interstitial and alveolar edema with trace effusions  Workstation performed: [id number]       Counseling / Coordination of Care  Total floor / unit time spent today 60 minutes  Greater than 50% of total time was spent with the patient and / or family counseling and / or coordination of care        Code Status: Level 1 - Full Code

## 2023-01-28 NOTE — ASSESSMENT & PLAN NOTE
· BP stable post acute respiratory distress interventions  · Continue home Amlodipine and Metoprolol  · Monitor BP closely while requiring IV diuretics   · Low salt diet

## 2023-01-28 NOTE — ASSESSMENT & PLAN NOTE
Wt Readings from Last 3 Encounters:   01/27/23 73 9 kg (163 lb 0 4 oz)   12/07/22 74 4 kg (164 lb)   09/27/22 74 4 kg (164 lb)     · Patient presented with acute onset GARCIA, SOB, and chest pressure  · Reports GARCIA and SOB begin after walking around completing errands  · SOB improves at rest  · CT A/P - bilateral pulmonary edema with trace effusions  · CXR - B/L pulmonary congestion per my read, official read pending   ·   · Per patient, was told to stop PO Lasix a few months ago and to only take PRN for leg swelling  · Echo 5/2021 - LVEF 45-50%, grade 1 DD  · Concern for acute on chronic HF causing pulmonary edema   · S/P 20 mg IV Lasix in ED  · Give additional 20 mg now  · Start 40 mg IV Lasix BID  · Check echo  · 1800 mL Fluid restriction   · Daily weights, I&Os  · Consult Cardiology

## 2023-01-28 NOTE — ASSESSMENT & PLAN NOTE
· Patient reports posterior pain in left leg, worse with movement  · Pain begins behind knee/upper calf and runs to mid thigh  · D-dimer 0 87  · Elevated H&H at 17 7 & 52 1  · CTA chest - no evidence of PE  · Concern for acute DVT  · Check LLE duplex to r/o DVT  · Start therapeutic Lovenox pending ultrasound results

## 2023-01-28 NOTE — ED NOTES
Patient transported to [address] [place] [address], [address] [place] [address]  01/27/23 8927

## 2023-01-28 NOTE — ASSESSMENT & PLAN NOTE
· Patient reported mid sternal chest pressure that began with sudden onset GARCIA and SOB  · S/P 325 mg ASA and Nitro paste in ED with resolution of pain  · Hx of Prinzmetal Angina  · Recent stress test 10/2021 with no ischemia noted  · Noted to be tachycardic in the 140s  · Improved after 15 mg IV Cardizem  · Trops negative 9 - 14  · Continue to trend  · Likely due to demand from [facility] CHF vs ACS  · Obtain EKG and notify provider for new/worsening chest pressure/pain  · Monitor on telemetry

## 2023-01-28 NOTE — ASSESSMENT & PLAN NOTE
Lab Results   Component Value Date    HGBA1C 7 1 (H) 12/05/2022       Recent Labs     01/27/23  2149   POCGLU 189*       Blood Sugar Average: Last 72 hrs:  (P) 189     · Hold home Xigduo while in hospital  · Continue home Lantus 15 units HS and Humalog 6 units TID with meals  · Start corrective SSI AC/HS with Accu-checks  · Hypoglycemia protocol

## 2023-01-28 NOTE — UTILIZATION REVIEW
Initial Clinical Review    Admission: Date/Time/Statement:   Admission Orders (From admission, onward)     Ordered        01/27/23 2053  INPATIENT ADMISSION  Once                      Orders Placed This Encounter   Procedures   • INPATIENT ADMISSION     Standing Status:   Standing     Number of Occurrences:   1     Order Specific Question:   Level of Care     Answer:   Med Surg [16]     Order Specific Question:   Estimated length of stay     Answer:   More than 2 Midnights     Order Specific Question:   Certification     Answer:   I certify that inpatient services are medically necessary for this patient for a duration of greater than two midnights  See H&P and MD Progress Notes for additional information about the patient's course of treatment  ED Arrival Information     Expected   -    Arrival   1/27/2023 18:01    Acuity   Emergent            Means of arrival   Walk-In    Escorted by   Self    Service   Hospitalist    Admission type   Emergency            Arrival complaint   chest pain/sob +CHF            Chief Complaint   Patient presents with   • Shortness of Breath     Pt presents to ED from home w/ sob starting  20 mins pta  Pt (+) DM, CHF, HTN  Initial Presentation: 58 y o  female presented to ED from home as inpatient admission for acute on chronic combines systolic and diastolic CHF  sudden onset of sob and chest pain PTA  Pt was doing shopping , and went to her car and felt chest tightness and sob  Pt came to er and has PO 90% on RA  Pt is tachycardic and -150/min  Pt has h/o of DM, HTN, CHF  Pt has no fever, but she is keep coughing at er  Pt had cardiac cath many years ago   Pt has RR 30/min  Per patient, was told to stop PO Lasix a few months ago and to only take PRN for leg swelling   Concern for acute on chronic HF causing pulmonary edema s/p 2nd dose IV lasix and will start IV Lasix BID, C/o posterior pain in left leg, worse with movement concerns for possible DVT  Check LLE duplex r/o DVT and start on SQ Lovenox  Plan 1800 fluid restriction IV lasix consult cardiology and supportive care  Date:  Bob Stovall states she is feeling much better today than she did yesterday  She feels like her breathing is approaching baseline denies any chest pain or productive cough  Does continue to note some left lower extremity pain as per cardiology will continue IV lasix Bllod sugars with SSI and home medication along with supportive care  01-28-23  Cardiology consult:  Assessment:  1  Acute on chronic HFmEF  2  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%   3  Acute hypoxic respiratory failure secondary to #1    4  Benign essential hypertension  5  Dyslipidemia   6  DM type 2   7  NSVT        Assessment/ Plan:  Ms Mohit Espinoza is a pleasant 55-year-old female with a known history of HFmEF with a presumed viral cardiomyopathy admitted with shortness of breath and chest pressure  She remains volume overloaded on exam   IV diuretics have been initiated and should be continued for a net negative fluid balance  No accurate urine output has been recorded and this was discussed with nursing  Her weight has decreased since admission    She weighed 164 pounds at her primary care provider visit in December  Intake/Output Summary (Last 24 hours) at 1/28/2023 1238  Last data filed at 1/28/2023 0924      Gross per 24 hour   Intake 480 ml   Output 450 ml   Net 30 ml      01/28/23 0600 73 2 kg (161 lb 6 4 oz)   01/27/23 2150 73 9 kg (163 lb 0 4 oz)   01/27/23 1856 74 4 kg (164 lb)     ED Triage Vitals   Temperature Pulse Respirations Blood Pressure SpO2   01/27/23 1810 01/27/23 1807 01/27/23 1806 01/27/23 1810 01/27/23 1807   (!) 97 4 °F (36 3 °C) (!) 146 (!) 30 (!) 186/114 90 %      Temp Source Heart Rate Source Patient Position - Orthostatic VS BP Location FiO2 (%)   01/27/23 1810 01/27/23 1807 01/27/23 2014 01/27/23 2014 --   Oral Monitor Sitting Right arm       Pain Score       01/27/23 2215       No Pain Wt Readings from Last 1 Encounters:   01/28/23 73 2 kg (161 lb 6 4 oz)     Additional Vital Signs:     Date/Time Temp Pulse Resp BP MAP (mmHg) SpO2 Calculated FIO2 (%) - Nasal Cannula O2 Flow Rate (L/min) Nasal Cannula O2 Flow Rate (L/min) O2 Device Patient Position - Orthostatic VS   01/28/23 1121 97 7 °F (36 5 °C) 63 20 105/63 -- 98 % -- -- -- -- Lying   01/28/23 1015 -- -- -- -- -- 95 % 40 -- 5 L/min Nasal cannula --   01/28/23 1010 -- -- -- -- -- 85 % Abnormal  36 -- 4 L/min Nasal cannula --   01/28/23 0857 -- 75 -- 116/64 -- -- -- -- -- -- --   01/28/23 0745 97 7 °F (36 5 °C) 74 16 116/67 -- 99 % -- -- -- -- Lying   01/28/23 0400 97 7 °F (36 5 °C) 77 14 118/81 92 97 % 36 -- 4 L/min Nasal cannula Lying   01/28/23 0127 -- -- -- 102/64 -- -- -- -- -- -- Lying   01/28/23 0120 97 6 °F (36 4 °C) -- 18 -- -- 96 % 36 -- 4 L/min Nasal cannula --   01/27/23 2200 -- -- -- -- -- -- 36 -- 4 L/min Nasal cannula --   01/27/23 2150 97 5 °F (36 4 °C) 77 16 147/75 -- 97 % -- 4 L/min -- Nasal cannula Lying   01/27/23 2028 -- -- -- -- -- -- -- -- -- Nasal cannula --   01/27/23 2020 -- -- -- 115/66 -- -- -- -- -- -- --   01/27/23 2014 -- 88 16 98/58 -- 93 % 36 -- 4 L/min Nasal cannula Sitting   01/27/23 1834 -- 107 Abnormal  30 Abnormal  145/68 -- 93 % 40 -- 5 L/min Nasal cannula --   01/27/23 1810 97 4 °F (36 3 °C) Abnormal  -- -- 186/114 Abnormal  -- -- -- -- -- -- --   01/27/23 1807 -- 146 Abnormal  30 Abnormal  -- -- 90 % -- -- -- None (Room air)      Pertinent Labs/Diagnostic Test Results:     EKG 01-27-23  ECG rate:  103     ECG rate assessment: tachycardic     Rhythm:     Rhythm: sinus tachycardia     Ectopy:     Ectopy: PVCs     QRS:     QRS axis:  Left     QRS intervals:  Wide   Conduction:     Conduction: abnormal       Abnormal conduction: non-specific intraventricular conduction delay     T waves:     T waves: non-specific     Other findings:     Other findings: LVH and poor R wave progression     CTA ED chest PE Study    (01/27 2024)      No pulmonary embolus  Extensive septal thickening and groundglass opacity due to interstitial and alveolar edema with trace effusions  XR chest portable    (01/27 2029)   CXR; bilateral pulmonary congestion c/w mild pulmonary edema  (01/28 5630)      Borderline cardiomegaly        Slight vascular congestion   VAS reflux lower limb venous duplex study with reflux assesment, unilateral    (Results Pending)     Results from last 7 days   Lab Units 01/27/23  1831   SARS-COV-2  Negative     Results from last 7 days   Lab Units 01/28/23  0448 01/27/23  1831   WBC Thousand/uL 10 34* 11 47*   HEMOGLOBIN g/dL 13 9 17 7*   HEMATOCRIT % 40 8 52 1*   PLATELETS Thousands/uL 232 313   NEUTROS ABS Thousands/µL 7 99* 7 00         Results from last 7 days   Lab Units 01/28/23 0448 01/27/23  1831   SODIUM mmol/L 139 138   POTASSIUM mmol/L 3 9 3 6   CHLORIDE mmol/L 104 103   CO2 mmol/L 24 22   ANION GAP mmol/L 11 13   BUN mg/dL 22 18   CREATININE mg/dL 0 93 0 89   EGFR ml/min/1 73sq m 66 69   CALCIUM mg/dL 9 0 9 9   MAGNESIUM mg/dL 2 0  --      Results from last 7 days   Lab Units 01/28/23 0448 01/27/23  1831   AST U/L 20 25   ALT U/L 21 26   ALK PHOS U/L 105* 137*   TOTAL PROTEIN g/dL 7 0 8 9*   ALBUMIN g/dL 4 1 5 0   TOTAL BILIRUBIN mg/dL 0 74 0 71     Results from last 7 days   Lab Units 01/28/23  1056 01/28/23  0723 01/27/23  2149   POC GLUCOSE mg/dl 131 143* 189*     Results from last 7 days   Lab Units 01/28/23  0448 01/27/23  1831   GLUCOSE RANDOM mg/dL 146* 159*     Results from last 7 days   Lab Units 01/27/23  2253 01/27/23 2022 01/27/23  1831   HS TNI 0HR ng/L  --   --  9   HS TNI 2HR ng/L  --  14  --    HSTNI D2 ng/L  --  5  --    HS TNI 4HR ng/L 21  --   --    HSTNI D4 ng/L 12  --   --      Results from last 7 days   Lab Units 01/27/23  1831   D-DIMER QUANTITATIVE ug/ml FEU 0 87*     Results from last 7 days   Lab Units 01/27/23  1831   PROTIME seconds 13 2   INR  0 97   PTT seconds 27     Results from last 7 days   Lab Units 01/27/23  1831   BNP pg/mL 342*     Results from last 7 days   Lab Units 01/27/23  1831   INFLUENZA A PCR  Negative   INFLUENZA B PCR  Negative   RSV PCR  Negative     Results from last 7 days   Lab Units 01/27/23  1940 01/27/23  1831   BLOOD CULTURE  Received in Microbiology Lab  Culture in Progress  Received in Microbiology Lab  Culture in Progress                 ED Treatment:   Medication Administration from 01/27/2023 1801 to 01/27/2023 2124       Date/Time Order Dose Route Action     01/27/2023 1828 EST aspirin tablet 325 mg 325 mg Oral Given     01/27/2023 1827 EST furosemide (LASIX) injection 20 mg 20 mg Intravenous Given     01/27/2023 1828 EST diltiazem (CARDIZEM) injection 15 mg 15 mg Intravenous Given     01/27/2023 1833 EST nitroglycerin (NITRO-BID) 2 % TD ointment 1 inch 1 inch Topical Given     01/27/2023 1932 EST iohexol (OMNIPAQUE) 350 MG/ML injection (SINGLE-DOSE) 100 mL 100 mL Intravenous Given        Past Medical History:   Diagnosis Date   • Abdominal pain    • Anxiety disorder    • Diabetes mellitus ([facility] [address] )    • Dizziness    • Heart failure (HCC)    • High cholesterol    • Hypertension    • Lightheadedness    • Palpitations    • SOB (shortness of breath)      Present on Admission:  • Nonsustained paroxysmal ventricular tachycardia  • Essential hypertension  • Acute on chronic combined systolic and diastolic congestive heart failure (HCC)  • Deep vein thrombosis (DVT) of left lower extremity (HCC)  • Chest pain  • Prolonged QT interval      Admitting Diagnosis: Acute pulmonary edema (HCC) [J81 0]  SOB (shortness of breath) [R06 02]  Hypoxia [R09 02]  Chest pain, unspecified type [R07 9]  Age/Sex: 58 y o  female     Admission Orders:  Telemetry  scd  Blood sugars ac and hs  Continuous pulse oximetry            Scheduled Medications:  aspirin, 81 mg, Oral, Daily  atorvastatin, 80 mg, Oral, QPM  docusate sodium, 100 mg, Oral, BID  enoxaparin, 1 mg/kg, Subcutaneous, BID  furosemide, 40 mg, Intravenous, BID  insulin glargine, 15 Units, Subcutaneous, HS  insulin lispro, 1-5 Units, Subcutaneous, TID AC  insulin lispro, 1-5 Units, Subcutaneous, HS  insulin lispro, 6 Units, Subcutaneous, TID With Meals  metoprolol succinate, 25 mg, Oral, HS  metoprolol succinate, 50 mg, Oral, Daily      Continuous IV Infusions:     PRN Meds:  acetaminophen, 650 mg, Oral, Q6H PRN  ALPRAZolam, 0 5 mg, Oral, Daily PRN  aluminum-magnesium hydroxide-simethicone, 30 mL, Oral, Q6H PRN  nitroglycerin, 0 4 mg, Sublingual, Q5 Min PRN        IP CONSULT TO NUTRITION SERVICES  IP CONSULT TO CARDIOLOGY    Network Utilization Review Department  ATTENTION: Please call with any questions or concerns to [phone] and carefully listen to the prompts so that you are directed to the right person  All voicemails are confidential   [facility] all requests for admission clinical reviews, approved or denied determinations and any other requests to dedicated fax number below belonging to the campus where the patient is receiving treatment   List of dedicated fax numbers for the Facilities:  [address] DENIALS (Administrative/Medical Necessity) [phone]   [address] (Maternity/NICU/Pediatrics) [phone]   [address]ma[address] [phone]   [address]  [phone]   [address] [address] [address] [phone]   [facility] [address] [facility] [address] [facility] [phone]

## 2023-01-28 NOTE — PLAN OF CARE
Problem: Potential for Falls  Goal: Patient will remain free of falls  Description: INTERVENTIONS:  - Educate patient/family on patient safety including physical limitations  - Instruct patient to call for assistance with activity   - Consult OT/PT to assist with strengthening/mobility   - Keep Call bell within reach  - Keep bed low and locked with side rails adjusted as appropriate  - Keep care items and personal belongings within reach  - Initiate and maintain comfort rounds  - Make Fall Risk Sign visible to staff  - Offer Toileting every 2 Hours, in advance of need  - Apply yellow socks and bracelet for high fall risk patients  - Consider moving patient to room near nurses station  Outcome: Progressing     Problem: PAIN - ADULT  Goal: Verbalizes/displays adequate comfort level or baseline comfort level  Description: Interventions:  - Encourage patient to monitor pain and request assistance  - Assess pain using appropriate pain scale  - Administer analgesics based on type and severity of pain and evaluate response  - Implement non-pharmacological measures as appropriate and evaluate response  - Consider cultural and social influences on pain and pain management  - Notify physician/advanced practitioner if interventions unsuccessful or patient reports new pain  Outcome: Progressing     Problem: INFECTION - ADULT  Goal: Absence or prevention of progression during hospitalization  Description: INTERVENTIONS:  - Assess and monitor for signs and symptoms of infection  - Monitor lab/diagnostic results  - Administer medications as ordered  - Instruct and encourage patient and family to use good hand hygiene technique  Outcome: Progressing  Goal: Absence of fever/infection during neutropenic period  Description: INTERVENTIONS:  - Monitor WBC    Outcome: Progressing     Problem: SAFETY ADULT  Goal: Patient will remain free of falls  Description: INTERVENTIONS:  - Educate patient/family on patient safety including physical limitations  - Instruct patient to call for assistance with activity   - Consult OT/PT to assist with strengthening/mobility   - Keep Call bell within reach  - Keep bed low and locked with side rails adjusted as appropriate  - Keep care items and personal belongings within reach  - Initiate and maintain comfort rounds  - Make Fall Risk Sign visible to staff  - Offer Toileting every 2 Hours, in advance of need  - Apply yellow socks and bracelet for high fall risk patients  - Consider moving patient to room near nurses station  Outcome: Progressing  Goal: Maintain or return to baseline ADL function  Description: INTERVENTIONS:  -  Assess patient's ability to carry out ADLs; assess patient's baseline for ADL function and identify physical deficits which impact ability to perform ADLs (bathing, care of mouth/teeth, toileting, grooming, dressing, etc )  - Assess/evaluate cause of self-care deficits   - Assess range of motion  - Assess patient's mobility; develop plan if impaired  - Assess patient's need for assistive devices and provide as appropriate  - Encourage maximum independence but intervene and supervise when necessary  - Involve family in performance of ADLs  - Assess for home care needs following discharge   - Consider OT consult to assist with ADL evaluation and planning for discharge  - Provide patient education as appropriate  Outcome: Progressing  Goal: Maintains/Returns to pre admission functional level  Description: INTERVENTIONS:  - Perform BMAT or MOVE assessment daily    - Set and communicate daily mobility goal to care team and patient/family/caregiver  - Collaborate with rehabilitation services on mobility goals if consulted  - Perform Range of Motion 3 times a day    - Dangle patient 3  times a day  - Stand patient 3  times a day  - Ambulate patient 3  times a day  - Out of bed to chair  3  times a day   - Out of bed for meals 3 times a day  - Out of bed for toileting  - Record patient progress and toleration of activity level   Outcome: Progressing     Problem: DISCHARGE PLANNING  Goal: Discharge to home or other facility with appropriate resources  Description: INTERVENTIONS:  - Identify barriers to discharge w/patient and caregiver  - Arrange for needed discharge resources and transportation as appropriate  - Identify discharge learning needs (meds, wound care, etc )  - Arrange for interpretive services to assist at discharge as needed  - Refer to Case Management Department for coordinating discharge planning if the patient needs post-hospital services based on physician/advanced practitioner order or complex needs related to functional status, cognitive ability, or social support system  Outcome: Progressing     Problem: Knowledge Deficit  Goal: Patient/family/caregiver demonstrates understanding of disease process, treatment plan, medications, and discharge instructions  Description: Complete learning assessment and assess knowledge base    Interventions:  - Provide teaching at level of understanding  - Provide teaching via preferred learning methods  Outcome: Progressing     Problem: CARDIOVASCULAR - ADULT  Goal: Maintains optimal cardiac output and hemodynamic stability  Description: INTERVENTIONS:  - Monitor I/O, vital signs and rhythm  - Monitor for S/S and trends of decreased cardiac output  - Administer and titrate ordered vasoactive medications to optimize hemodynamic stability  - Assess quality of pulses, skin color and temperature  - Assess for signs of decreased coronary artery perfusion  - Instruct patient to report change in severity of symptoms  Outcome: Progressing  Goal: Absence of cardiac dysrhythmias or at baseline rhythm  Description: INTERVENTIONS:  - Continuous cardiac monitoring, vital signs, obtain 12 lead EKG if ordered  - Administer antiarrhythmic and heart rate control medications as ordered  - Monitor electrolytes and administer replacement therapy as ordered  Outcome: Progressing     Problem: RESPIRATORY - ADULT  Goal: Achieves optimal ventilation and oxygenation  Description: INTERVENTIONS:  - Assess for changes in respiratory status  - Assess for changes in mentation and behavior  - Position to facilitate oxygenation and minimize respiratory effort  - Oxygen administered by appropriate delivery if ordered  - Initiate smoking cessation education as indicated  - Encourage broncho-pulmonary hygiene including cough, deep breathe, Incentive Spirometry  - Assess the need for suctioning and aspirate as needed  - Assess and instruct to report SOB or any respiratory difficulty  - Respiratory Therapy support as indicated  Outcome: Progressing

## 2023-01-29 PROBLEM — J96.01 ACUTE RESPIRATORY FAILURE WITH HYPOXIA (HCC): Status: ACTIVE | Noted: 2023-01-29

## 2023-01-29 LAB
ANION GAP SERPL CALCULATED.3IONS-SCNC: 10 MMOL/L (ref 4–13)
BUN SERPL-MCNC: 34 MG/DL (ref 5–25)
CALCIUM SERPL-MCNC: 9.1 MG/DL (ref 8.4–10.2)
CHLORIDE SERPL-SCNC: 103 MMOL/L (ref 96–108)
CO2 SERPL-SCNC: 27 MMOL/L (ref 21–32)
CREAT SERPL-MCNC: 1.13 MG/DL (ref 0.6–1.3)
ERYTHROCYTE [DISTWIDTH] IN BLOOD BY AUTOMATED COUNT: 14 % (ref 11.6–15.1)
GFR SERPL CREATININE-BSD FRML MDRD: 52 ML/MIN/1.73SQ M
GLUCOSE SERPL-MCNC: 127 MG/DL (ref 65–140)
GLUCOSE SERPL-MCNC: 130 MG/DL (ref 65–140)
GLUCOSE SERPL-MCNC: 161 MG/DL (ref 65–140)
GLUCOSE SERPL-MCNC: 213 MG/DL (ref 65–140)
GLUCOSE SERPL-MCNC: 99 MG/DL (ref 65–140)
HCT VFR BLD AUTO: 45 % (ref 34.8–46.1)
HGB BLD-MCNC: 14.3 G/DL (ref 11.5–15.4)
MAGNESIUM SERPL-MCNC: 2.1 MG/DL (ref 1.9–2.7)
MCH RBC QN AUTO: 28.7 PG (ref 26.8–34.3)
MCHC RBC AUTO-ENTMCNC: 31.8 G/DL (ref 31.4–37.4)
MCV RBC AUTO: 90 FL (ref 82–98)
PLATELET # BLD AUTO: 246 THOUSANDS/UL (ref 149–390)
PMV BLD AUTO: 10.3 FL (ref 8.9–12.7)
POTASSIUM SERPL-SCNC: 3.3 MMOL/L (ref 3.5–5.3)
RBC # BLD AUTO: 4.98 MILLION/UL (ref 3.81–5.12)
SODIUM SERPL-SCNC: 140 MMOL/L (ref 135–147)
WBC # BLD AUTO: 8.5 THOUSAND/UL (ref 4.31–10.16)

## 2023-01-29 RX ORDER — POTASSIUM CHLORIDE 20 MEQ/1
40 TABLET, EXTENDED RELEASE ORAL ONCE
Status: COMPLETED | OUTPATIENT
Start: 2023-01-29 | End: 2023-01-29

## 2023-01-29 RX ORDER — POTASSIUM CHLORIDE 20 MEQ/1
20 TABLET, EXTENDED RELEASE ORAL ONCE
Status: COMPLETED | OUTPATIENT
Start: 2023-01-29 | End: 2023-01-29

## 2023-01-29 RX ADMIN — POTASSIUM CHLORIDE 40 MEQ: 1500 TABLET, EXTENDED RELEASE ORAL at 08:35

## 2023-01-29 RX ADMIN — FUROSEMIDE 40 MG: 10 INJECTION, SOLUTION INTRAMUSCULAR; INTRAVENOUS at 18:13

## 2023-01-29 RX ADMIN — INSULIN LISPRO 6 UNITS: 100 INJECTION, SOLUTION INTRAVENOUS; SUBCUTANEOUS at 11:54

## 2023-01-29 RX ADMIN — METOPROLOL SUCCINATE 25 MG: 25 TABLET, EXTENDED RELEASE ORAL at 21:52

## 2023-01-29 RX ADMIN — ENOXAPARIN SODIUM 70 MG: 80 INJECTION SUBCUTANEOUS at 18:12

## 2023-01-29 RX ADMIN — INSULIN LISPRO 1 UNITS: 100 INJECTION, SOLUTION INTRAVENOUS; SUBCUTANEOUS at 16:42

## 2023-01-29 RX ADMIN — INSULIN LISPRO 6 UNITS: 100 INJECTION, SOLUTION INTRAVENOUS; SUBCUTANEOUS at 16:41

## 2023-01-29 RX ADMIN — ASPIRIN 81 MG 81 MG: 81 TABLET ORAL at 08:35

## 2023-01-29 RX ADMIN — INSULIN LISPRO 6 UNITS: 100 INJECTION, SOLUTION INTRAVENOUS; SUBCUTANEOUS at 08:35

## 2023-01-29 RX ADMIN — ATORVASTATIN CALCIUM 80 MG: 40 TABLET, FILM COATED ORAL at 18:13

## 2023-01-29 RX ADMIN — ENOXAPARIN SODIUM 70 MG: 80 INJECTION SUBCUTANEOUS at 08:36

## 2023-01-29 RX ADMIN — POTASSIUM CHLORIDE 20 MEQ: 1500 TABLET, EXTENDED RELEASE ORAL at 18:13

## 2023-01-29 RX ADMIN — INSULIN LISPRO 2 UNITS: 100 INJECTION, SOLUTION INTRAVENOUS; SUBCUTANEOUS at 11:55

## 2023-01-29 RX ADMIN — INSULIN GLARGINE 15 UNITS: 100 INJECTION, SOLUTION SUBCUTANEOUS at 21:53

## 2023-01-29 NOTE — ASSESSMENT & PLAN NOTE
POA with SpO2 down to 90% on room air, subsequently placed on 5 L NC O2    · Likely in setting of acute CHF exacerbation  · Not previously on home O2   · BC x2: NGTD (24 hrs)  · Currently on 2-3L NC O2, titrate as able  · Likely will need desat screening prior to discharge  · Respiratory protocol

## 2023-01-29 NOTE — PROGRESS NOTES
[facility]  66   Progress Note - Sidney Iniguez 1960, 58 y o  female MRN: [id number]  Unit/Bed#: -01 Encounter: [id number]  Primary Care Provider: Buster Cheung MD   Date and time admitted to hospital: 1/27/2023  6:02 PM    * Acute on chronic combined systolic and diastolic congestive heart failure [facility])  Assessment & Plan  Wt Readings from Last 3 Encounters:   01/29/23 72 kg (158 lb 12 8 oz)   12/07/22 74 4 kg (164 lb)   09/27/22 74 4 kg (164 lb)     Patient presented with acute onset GARCIA, SOB, and chest pressure  Reports GARCIA and SOB begin after walking around completing errands, SOB improves at rest    · Per patient, was told to stop PO Lasix a few months ago and to only take PRN for leg swelling  · Echo 5/2021 - LVEF 45-50%, grade 1 DD  · CTA chest: No pulmonary embolus  Extensive septal thickening and groundglass opacity due to interstitial and alveolar edema with trace effusions  · BNP: 342  CXR: Borderline cardiomegaly, slight vascular congestion  · S/P 20 mg IV Lasix in ED  · Net negative urine output and decrease in weight, unclear of accuracy initially on admission  · Pending repeat ECHO after weekend  · Cardiology consulted:  · Continue IV Lasix 40mg BID, caution with soft/low BP and mild elevation in creatinine today  · 1800 mL Fluid restriction   · Daily weights, I&Os    Acute respiratory failure with hypoxia ([address] )  Assessment & Plan  POA with SpO2 down to 90% on room air, subsequently placed on 5 L NC O2    · Likely in setting of acute CHF exacerbation  · Not previously on home O2   · BC x2: NGTD (24 hrs)  · Currently on 2-3L NC O2, titrate as able  · Likely will need desat screening prior to discharge  · Respiratory protocol    Deep vein thrombosis (DVT) of left lower extremity [facility])  Assessment & Plan  Patient reports posterior pain in left leg, worse with movement  Pain begins behind knee/upper calf and runs to mid thigh  · D-dimer: 0 87   Elevated H/H at 17  7/52 1 POA  · Check LLE duplex to r/o DVT after weekend  · C/w therapeutic Lovenox   · Noted decrease in Hgb from 17 7->13 9  Possibly some fluid shifting vs lab error, but no acute bleed suspected  Chest pain  Assessment & Plan  Patient reported mid sternal chest pressure that began with sudden onset GARCIA and SOB  S/p 325 mg ASA and Nitro paste in ED with resolution of pain    · Hx of Prinzmetal Angina - Recent stress test 10/2021 with no ischemia noted  · Noted to be tachycardic in the 140s, Improved after 15 mg IV Cardizem  · HS trop: 9->14->21  · Cardiology following:  · Likely due to demand from [facility] CHF, less likely suspect ACS  · Added Toprol 25 mg qHS, D/C amlodipine   · Consider adding Imdur if BP allows  · Monitor on telemetry     Type 2 diabetes mellitus treated with insulin [facility])  Assessment & Plan  Lab Results   Component Value Date    HGBA1C 7 1 (H) 12/05/2022       Recent Labs     01/28/23  1631 01/28/23  1859 01/28/23 2021 01/29/23  0659   POCGLU 76 214* 214* 130       Blood Sugar Average: Last 72 hrs:  (P) 156 [id number]     · Hold home Xigduo while in hospital  · Continue home Lantus 15 units HS and Humalog 6 units TID with meals  · SSI AC/HS with Accu-checks  · Hypoglycemia protocol     Essential hypertension  Assessment & Plan  · BP reviewed, low/soft this a m  but improving  · Continue home Amlodipine and Metoprolol with holding parameters  · Monitor BP closely while requiring IV diuretics   · Low salt diet    Nonsustained paroxysmal ventricular tachycardia  Assessment & Plan  · Hx of NPVT, follows outpatient with cardiology  · Stress test 10/2022 with no ischemia noted  · 1 run of Vtach noted in ED, self resolved  · ST 140s in ED,  improved s/p 15 mg IV Cardizem   · Continue Metoprolol    Prolonged QT interval  Assessment & Plan  · QTc >500 on admission  · Hold QT prolonging agents  · Reassess EKG today      VTE Pharmacologic Prophylaxis: VTE Score: 9 High Risk (Score >/= 5) - Pharmacological DVT Prophylaxis Ordered: enoxaparin (Lovenox)  Sequential Compression Devices Ordered  Patient Centered Rounds: I performed bedside rounds with nursing staff today  Discussions with Specialists or Other Care Team Provider: None    Education and Discussions with Family / Patient: Patient declined call to   Time Spent for Care: 45 minutes  More than 50% of total time spent on counseling and coordination of care as described above  Current Length of Stay: 2 day(s)  Current Patient Status: Inpatient   Certification Statement: The patient will continue to require additional inpatient hospital stay due to Pending venous duplex of lower extremity, IV diuresis  Discharge Plan: Anticipate discharge in 24-48 hrs to home  Code Status: Level 1 - Full Code    Subjective:   Patient is seen at bedside this a m , reports significant improvement in SOB, denies any recurrence of chest pain  Reports LLE pain is improved but still present with movement  Objective:     Vitals:   Temp (24hrs), Av 1 °F (36 7 °C), Min:97 5 °F (36 4 °C), Max:98 6 °F (37 °C)    Temp:  [97 5 °F (36 4 °C)-98 6 °F (37 °C)] 97 5 °F (36 4 °C)  HR:  [62-79] 66  Resp:  [16-18] 16  BP: ()/(49-69) 109/62  SpO2:  [95 %-99 %] 97 %  Body mass index is 27 26 kg/m²  Input and Output Summary (last 24 hours): Intake/Output Summary (Last 24 hours) at 2023 1229  Last data filed at 2023 0354  Gross per 24 hour   Intake 600 ml   Output 2200 ml   Net -1600 ml       Physical Exam:   Physical Exam  Constitutional:       General: She is not in acute distress  Appearance: She is not ill-appearing, toxic-appearing or diaphoretic  Cardiovascular:      Rate and Rhythm: Normal rate and regular rhythm  Pulses: Normal pulses  Heart sounds: Normal heart sounds  Pulmonary:      Effort: Pulmonary effort is normal  No respiratory distress  Breath sounds: Normal breath sounds     Abdominal: General: Bowel sounds are normal  There is no distension  Palpations: Abdomen is soft  Tenderness: There is no abdominal tenderness  Musculoskeletal:         General: Tenderness present  No swelling  Comments: Mild tenderness to palpation of posterior aspect of L knee/thigh, no overt edema compared to RLE  Skin:     General: Skin is warm  Neurological:      General: No focal deficit present  Mental Status: She is alert     Psychiatric:         Mood and Affect: Mood normal          Behavior: Behavior normal          Additional Data:     Labs:  Results from last 7 days   Lab Units 01/29/23  0426 01/28/23  0448   WBC Thousand/uL 8 50 10 34*   HEMOGLOBIN g/dL 14 3 13 9   HEMATOCRIT % 45 0 40 8   PLATELETS Thousands/uL 246 232   NEUTROS PCT %  --  78*   LYMPHS PCT %  --  17   MONOS PCT %  --  4   EOS PCT %  --  1     Results from last 7 days   Lab Units 01/29/23  0426 01/28/23  0448   SODIUM mmol/L 140 139   POTASSIUM mmol/L 3 3* 3 9   CHLORIDE mmol/L 103 104   CO2 mmol/L 27 24   BUN mg/dL 34* 22   CREATININE mg/dL 1 13 0 93   ANION GAP mmol/L 10 11   CALCIUM mg/dL 9 1 9 0   ALBUMIN g/dL  --  4 1   TOTAL BILIRUBIN mg/dL  --  0 74   ALK PHOS U/L  --  105*   ALT U/L  --  21   AST U/L  --  20   GLUCOSE RANDOM mg/dL 127 146*     Results from last 7 days   Lab Units 01/27/23  1831   INR  0 97     Results from last 7 days   Lab Units 01/29/23  1121 01/29/23  0659 01/28/23  2021 01/28/23  1859 01/28/23  1631 01/28/23  1056 01/28/23  0723 01/27/23  2149   POC GLUCOSE mg/dl 213* 130 214* 214* 76 131 143* 189*               Lines/Drains:  Invasive Devices     Peripheral Intravenous Line  Duration           Peripheral IV 01/27/23 Left;Ventral (anterior) Forearm 1 day                  Telemetry:  Telemetry Orders (From admission, onward)             48 Hour Telemetry Monitoring  Continuous x 48 hours        References:    Telemetry Guidelines   Question:  Reason for 48 Hour Telemetry  Answer:  Arrhythmias Requiring Medical Therapy (eg  SVT, Vtach/fib, Bradycardia, Uncontrolled A-fib)                 Telemetry Reviewed: Normal Sinus Rhythm and PVCs  Indication for Continued Telemetry Use: Arrthymias requiring medical therapy             Imaging: Reviewed radiology reports from this admission including: chest xray and chest CT scan    Recent Cultures (last 7 days):   Results from last 7 days   Lab Units 01/27/23  1940 01/27/23  1831   BLOOD CULTURE  No Growth at 24 hrs  No Growth at 24 hrs  Last 24 Hours Medication List:   Current Facility-Administered Medications   Medication Dose Route Frequency Provider Last Rate   • acetaminophen  650 mg Oral Q6H PRN EMANUEL Brown     • ALPRAZolam  0 5 mg Oral Daily PRN EMANUEL Brown     • aluminum-magnesium hydroxide-simethicone  30 mL Oral Q6H PRN EMANUEL Brown     • aspirin  81 mg Oral Daily Mathew Darling, [address]     • atorvastatin  80 mg Oral QPM EMANUEL Almendarez     • docusate sodium  100 mg Oral BID EMANUEL Brown     • enoxaparin  1 mg/kg Subcutaneous BID EMANUEL Song     • furosemide  40 mg Intravenous BID EMANUEL Brown     • insulin glargine  15 Units Subcutaneous HS EMANUEL Song     • insulin lispro  1-5 Units Subcutaneous TID AC EMANUEL Newton     • insulin lispro  1-5 Units Subcutaneous HS EMANUEL Song     • insulin lispro  6 Units Subcutaneous TID With Meals EMANUEL Brown     • metoprolol succinate  25 mg Oral HS Stephania Morataya, DO     • metoprolol succinate  50 mg Oral Daily EMANUEL Song     • nitroglycerin  0 4 mg Sublingual Q5 Min PRN EMANUEL Song     • potassium chloride  20 mEq Oral Once Maximiliano Lee PA-C          Today, Patient Was Seen By: Maximiliano Lee PA-C    **Please Note: This note may have been constructed using a voice recognition system  **

## 2023-01-29 NOTE — ASSESSMENT & PLAN NOTE
Wt Readings from Last 3 Encounters:   01/29/23 72 kg (158 lb 12 8 oz)   12/07/22 74 4 kg (164 lb)   09/27/22 74 4 kg (164 lb)     Patient presented with acute onset GARCIA, SOB, and chest pressure  Reports GARCIA and SOB begin after walking around completing errands, SOB improves at rest    · Per patient, was told to stop PO Lasix a few months ago and to only take PRN for leg swelling  · Echo 5/2021 - LVEF 45-50%, grade 1 DD  · CTA chest: No pulmonary embolus  Extensive septal thickening and groundglass opacity due to interstitial and alveolar edema with trace effusions  · BNP: 342  CXR: Borderline cardiomegaly, slight vascular congestion    · S/P 20 mg IV Lasix in ED  · Net negative urine output and decrease in weight, unclear of accuracy initially on admission  · Pending repeat ECHO after weekend  · Cardiology consulted:  · Continue IV Lasix 40mg BID, caution with soft/low BP and mild elevation in creatinine today  · 1800 mL Fluid restriction   · Daily weights, I&Os

## 2023-01-29 NOTE — ASSESSMENT & PLAN NOTE
Patient reports posterior pain in left leg, worse with movement  Pain begins behind knee/upper calf and runs to mid thigh  · D-dimer: 0 87  Elevated H/H at 17 7/52 1 POA  · Check LLE duplex to r/o DVT after weekend  · C/w therapeutic Lovenox   · Noted decrease in Hgb from 17 7->13 9  Possibly some fluid shifting vs lab error, but no acute bleed suspected

## 2023-01-29 NOTE — ASSESSMENT & PLAN NOTE
Patient reported mid sternal chest pressure that began with sudden onset GARCIA and SOB  S/p 325 mg ASA and Nitro paste in ED with resolution of pain    · Hx of Prinzmetal Angina - Recent stress test 10/2021 with no ischemia noted  · Noted to be tachycardic in the 140s, Improved after 15 mg IV Cardizem  · HS trop: 9->14->21  · Cardiology following:  · Likely due to demand from [facility] CHF, less likely suspect ACS  · Added Toprol 25 mg qHS, D/C amlodipine   · Consider adding Imdur if BP allows  · Monitor on telemetry

## 2023-01-29 NOTE — ASSESSMENT & PLAN NOTE
· BP reviewed, low/soft this a m  but improving  · Continue home Amlodipine and Metoprolol with holding parameters  · Monitor BP closely while requiring IV diuretics   · Low salt diet

## 2023-01-29 NOTE — ASSESSMENT & PLAN NOTE
· Hx of NPVT, follows outpatient with cardiology  · Stress test 10/2022 with no ischemia noted  · 1 run of Vtach noted in ED, self resolved  · ST 140s in ED,  improved s/p 15 mg IV Cardizem   · Continue Metoprolol

## 2023-01-29 NOTE — ASSESSMENT & PLAN NOTE
Lab Results   Component Value Date    HGBA1C 7 1 (H) 12/05/2022       Recent Labs     01/28/23  1631 01/28/23  1859 01/28/23 2021 01/29/23  0659   POCGLU 76 214* 214* 130       Blood Sugar Average: Last 72 hrs:  (P) 156 [id number]     · Hold home Xigduo while in hospital  · Continue home Lantus 15 units HS and Humalog 6 units TID with meals  · SSI AC/HS with Accu-checks  · Hypoglycemia protocol

## 2023-01-30 ENCOUNTER — APPOINTMENT (INPATIENT)
Dept: VASCULAR ULTRASOUND | Facility: HOSPITAL | Age: 63
End: 2023-01-30

## 2023-01-30 ENCOUNTER — APPOINTMENT (INPATIENT)
Dept: NON INVASIVE DIAGNOSTICS | Facility: HOSPITAL | Age: 63
End: 2023-01-30

## 2023-01-30 LAB
ANION GAP SERPL CALCULATED.3IONS-SCNC: 10 MMOL/L (ref 4–13)
AORTIC ROOT: 2.8 CM
APICAL FOUR CHAMBER EJECTION FRACTION: 41 %
ATRIAL RATE: 103 BPM
ATRIAL RATE: 120 BPM
ATRIAL RATE: 141 BPM
ATRIAL RATE: 69 BPM
ATRIAL RATE: 72 BPM
ATRIAL RATE: 83 BPM
ATRIAL RATE: 90 BPM
BUN SERPL-MCNC: 33 MG/DL (ref 5–25)
CALCIUM SERPL-MCNC: 9.1 MG/DL (ref 8.4–10.2)
CHLORIDE SERPL-SCNC: 105 MMOL/L (ref 96–108)
CO2 SERPL-SCNC: 26 MMOL/L (ref 21–32)
CREAT SERPL-MCNC: 1 MG/DL (ref 0.6–1.3)
E WAVE DECELERATION TIME: 284 MS
ERYTHROCYTE [DISTWIDTH] IN BLOOD BY AUTOMATED COUNT: 13.7 % (ref 11.6–15.1)
FRACTIONAL SHORTENING: 20 (ref 28–44)
GFR SERPL CREATININE-BSD FRML MDRD: 60 ML/MIN/1.73SQ M
GLOBAL LONGITUIDAL STRAIN: -10 %
GLUCOSE SERPL-MCNC: 129 MG/DL (ref 65–140)
GLUCOSE SERPL-MCNC: 130 MG/DL (ref 65–140)
GLUCOSE SERPL-MCNC: 144 MG/DL (ref 65–140)
GLUCOSE SERPL-MCNC: 159 MG/DL (ref 65–140)
GLUCOSE SERPL-MCNC: 196 MG/DL (ref 65–140)
GLUCOSE SERPL-MCNC: 68 MG/DL (ref 65–140)
HCT VFR BLD AUTO: 42.6 % (ref 34.8–46.1)
HGB BLD-MCNC: 13.6 G/DL (ref 11.5–15.4)
INTERVENTRICULAR SEPTUM IN DIASTOLE (PARASTERNAL SHORT AXIS VIEW): 1.1 CM
INTERVENTRICULAR SEPTUM: 1.1 CM (ref 0.6–1.1)
LAAS-AP2: 20.6 CM2
LAAS-AP4: 16.5 CM2
LEFT ATRIUM SIZE: 3.7 CM
LEFT INTERNAL DIMENSION IN SYSTOLE: 3.7 CM (ref 2.1–4)
LEFT VENTRICULAR INTERNAL DIMENSION IN DIASTOLE: 4.6 CM (ref 3.5–6)
LEFT VENTRICULAR POSTERIOR WALL IN END DIASTOLE: 1.2 CM
LEFT VENTRICULAR STROKE VOLUME: 39 ML
LVSV (TEICH): 39 ML
MCH RBC QN AUTO: 29.1 PG (ref 26.8–34.3)
MCHC RBC AUTO-ENTMCNC: 31.9 G/DL (ref 31.4–37.4)
MCV RBC AUTO: 91 FL (ref 82–98)
MV E'TISSUE VEL-LAT: 4 CM/S
MV PEAK A VEL: 0.82 M/S
MV PEAK E VEL: 67 CM/S
MV STENOSIS PRESSURE HALF TIME: 82 MS
MV VALVE AREA P 1/2 METHOD: 2.68
P AXIS: 46 DEGREES
P AXIS: 59 DEGREES
P AXIS: 61 DEGREES
P AXIS: 67 DEGREES
PLATELET # BLD AUTO: 223 THOUSANDS/UL (ref 149–390)
PMV BLD AUTO: 10.5 FL (ref 8.9–12.7)
POTASSIUM SERPL-SCNC: 4 MMOL/L (ref 3.5–5.3)
PR INTERVAL: 128 MS
PR INTERVAL: 136 MS
PR INTERVAL: 160 MS
PR INTERVAL: 166 MS
PR INTERVAL: 172 MS
PR INTERVAL: 172 MS
QRS AXIS: -2 DEGREES
QRS AXIS: -28 DEGREES
QRS AXIS: -8 DEGREES
QRS AXIS: 11 DEGREES
QRS AXIS: 11 DEGREES
QRS AXIS: 17 DEGREES
QRS AXIS: 53 DEGREES
QRSD INTERVAL: 122 MS
QRSD INTERVAL: 124 MS
QRSD INTERVAL: 126 MS
QRSD INTERVAL: 126 MS
QRSD INTERVAL: 128 MS
QRSD INTERVAL: 128 MS
QRSD INTERVAL: 130 MS
QT INTERVAL: 324 MS
QT INTERVAL: 332 MS
QT INTERVAL: 362 MS
QT INTERVAL: 406 MS
QT INTERVAL: 458 MS
QT INTERVAL: 474 MS
QT INTERVAL: 476 MS
QTC INTERVAL: 490 MS
QTC INTERVAL: 510 MS
QTC INTERVAL: 512 MS
QTC INTERVAL: 515 MS
QTC INTERVAL: 531 MS
QTC INTERVAL: 556 MS
QTC INTERVAL: 560 MS
RBC # BLD AUTO: 4.67 MILLION/UL (ref 3.81–5.12)
RIGHT ATRIUM AREA SYSTOLE A4C: 10.6 CM2
RIGHT VENTRICLE ID DIMENSION: 2.8 CM
SL CV LEFT ATRIUM LENGTH A2C: 4.9 CM
SL CV LV EF: 45
SL CV PED ECHO LEFT VENTRICLE DIASTOLIC VOLUME (MOD BIPLANE) 2D: 97 ML
SL CV PED ECHO LEFT VENTRICLE SYSTOLIC VOLUME (MOD BIPLANE) 2D: 58 ML
SODIUM SERPL-SCNC: 141 MMOL/L (ref 135–147)
T WAVE AXIS: 106 DEGREES
T WAVE AXIS: 66 DEGREES
T WAVE AXIS: 70 DEGREES
T WAVE AXIS: 73 DEGREES
T WAVE AXIS: 82 DEGREES
T WAVE AXIS: 84 DEGREES
T WAVE AXIS: 93 DEGREES
TR MAX PG: 3 MMHG
TR PEAK VELOCITY: 0.8 M/S
TRICUSPID ANNULAR PLANE SYSTOLIC EXCURSION: 1.6 CM
TRICUSPID VALVE PEAK REGURGITATION VELOCITY: 0.83 M/S
VENTRICULAR RATE: 103 BPM
VENTRICULAR RATE: 122 BPM
VENTRICULAR RATE: 138 BPM
VENTRICULAR RATE: 143 BPM
VENTRICULAR RATE: 69 BPM
VENTRICULAR RATE: 83 BPM
VENTRICULAR RATE: 90 BPM
WBC # BLD AUTO: 8.43 THOUSAND/UL (ref 4.31–10.16)

## 2023-01-30 RX ORDER — ENOXAPARIN SODIUM 100 MG/ML
40 INJECTION SUBCUTANEOUS
Status: DISCONTINUED | OUTPATIENT
Start: 2023-01-31 | End: 2023-01-31 | Stop reason: HOSPADM

## 2023-01-30 RX ADMIN — ATORVASTATIN CALCIUM 80 MG: 40 TABLET, FILM COATED ORAL at 18:40

## 2023-01-30 RX ADMIN — INSULIN LISPRO 6 UNITS: 100 INJECTION, SOLUTION INTRAVENOUS; SUBCUTANEOUS at 08:25

## 2023-01-30 RX ADMIN — INSULIN LISPRO 6 UNITS: 100 INJECTION, SOLUTION INTRAVENOUS; SUBCUTANEOUS at 11:58

## 2023-01-30 RX ADMIN — ASPIRIN 81 MG 81 MG: 81 TABLET ORAL at 08:25

## 2023-01-30 RX ADMIN — DOCUSATE SODIUM 100 MG: 100 CAPSULE, LIQUID FILLED ORAL at 18:40

## 2023-01-30 RX ADMIN — ENOXAPARIN SODIUM 70 MG: 80 INJECTION SUBCUTANEOUS at 08:25

## 2023-01-30 RX ADMIN — INSULIN GLARGINE 15 UNITS: 100 INJECTION, SOLUTION SUBCUTANEOUS at 22:42

## 2023-01-30 RX ADMIN — INSULIN LISPRO 1 UNITS: 100 INJECTION, SOLUTION INTRAVENOUS; SUBCUTANEOUS at 22:43

## 2023-01-30 RX ADMIN — METOPROLOL SUCCINATE 25 MG: 25 TABLET, EXTENDED RELEASE ORAL at 22:44

## 2023-01-30 RX ADMIN — INSULIN LISPRO 1 UNITS: 100 INJECTION, SOLUTION INTRAVENOUS; SUBCUTANEOUS at 11:58

## 2023-01-30 RX ADMIN — FUROSEMIDE 40 MG: 10 INJECTION, SOLUTION INTRAMUSCULAR; INTRAVENOUS at 18:41

## 2023-01-30 RX ADMIN — INSULIN LISPRO 6 UNITS: 100 INJECTION, SOLUTION INTRAVENOUS; SUBCUTANEOUS at 16:48

## 2023-01-30 RX ADMIN — FUROSEMIDE 40 MG: 10 INJECTION, SOLUTION INTRAMUSCULAR; INTRAVENOUS at 08:30

## 2023-01-30 RX ADMIN — DOCUSATE SODIUM 100 MG: 100 CAPSULE, LIQUID FILLED ORAL at 08:25

## 2023-01-30 RX ADMIN — METOPROLOL SUCCINATE 50 MG: 50 TABLET, EXTENDED RELEASE ORAL at 08:30

## 2023-01-30 NOTE — PROGRESS NOTES
Cardiology Progress Note - Stephanie Oswald 58 y o  female MRN: [id number]    Unit/Bed#: -01 Encounter: [id number]        Hospital Problems:  Principal Problem:    Acute on chronic combined systolic and diastolic congestive heart failure (HCC)  Active Problems:    Prolonged QT interval    Nonsustained paroxysmal ventricular tachycardia    Essential hypertension    Type 2 diabetes mellitus treated with insulin (HCC)    Deep vein thrombosis (DVT) of left lower extremity (HCC)    Chest pain    Acute respiratory failure with hypoxia ([facility])      Assessment/Recommendations:    Acute on chronic heart failure with last EF of 45-50%, related to viral cardiomyopathy:  No CAD on cardiac cath in 2016  Stress echo in 2021 showed transient atrial fibrillation, nondiagnostic for ischemia due to poor parasternal views but apical views showed normal augmentation with stress  Admitted with atrial tachycardia, worsening dyspnea and chest discomfort  Cardiac enzymes unremarkable  Overnight telemetry with sinus rhythm, PACs, few couplets, 1 brief atrial run but no atrial fibrillation seen  Overnight negative fluid balance noted  Weight is down by 3 pounds  BUN, creatinine and potassium are stable  Continue IV diuretics  oxygen requirements are 2 L at present  Was on as needed diuretics at home  Possibly need to go home on standing dose of oral diuretics  Echo is pending from today  Atrial tachycardia:  Seen on admission EKG  Prior history of atrial fibrillation during stress echo  No recurrence since admission  Palpitation symptoms have now resolved  Plan continue telemetry monitoring  Continue metoprolol  Chest discomfort:  No coronary disease on cardiac cath in 2016  Nondiagnostic stress echo due to development of arrhythmia in 10/22, but she achieved 94% of her predicted heart rate at 3 minutes  She had no angina, hypertensive response noted and transient A  fib and PVCs seen    Apical views showed hyperdynamic function  Recent cardiology visit from 10/22 from Dr Nubia Martinez from [facility] reviewed  Plans were to continue ongoing medical therapy and if symptoms of chest pain was persistent, repeat cardiac catheterization was to be pursued  Outpatient cardiology follow-up will be with  Dr Nubia Martinez from [facility]  Plan of care discussed with the patient and primary team       Subjective:     28-year-old female with a history of nonischemic viral cardiomyopathy, hypertension, dyslipidemia, type 2 diabetes, paroxysmal atrial fibrillation noted during stress echo, admitted with acute on chronic heart failure  Overnight events reviewed  She feels much better  She has no edema  She has no orthopnea or PND  No further palpitations or chest pain  Prior cardiac work-up at [facility]:  Cardiac catheterization:  1/17/2016 Cardiac Cath: LVEF 25-30 % and no significant evidence of CAD  Cardiac Diagnostics:  8/21/15: NM stress exercise: The hemodynamic response to exercise was normal  No cp with exercise  No ekg changes diagnostic for ischemia  Breast attenuation present  No evidence for ischemia on perfusion imaging  Normal LV systolic function  Recent nuclear stress was negative for ischemia  1/17/2016 Echocardiogram: LVEF 35%-40%  Trace MR and trace TR  Normal IVC  02/04/2016 Echocardiogram: LVEF 50% - no valvular disease  10/5/2016: Echo 2D Complete: Low Normal left ventricular systolic function  Mild concentric left ventricular hypertrophy  Trace mitral regurgitation  Visually Estimated LV Ejection Fraction is:50%  9/5/18 Echo: Normal left ventricular systolic function  There is apical lateral hypokinesis  Estimated left ventricular ejection fraction is 55%  Aortic sclerosis without stenosis  Mild mitral regurgitation  5/26/2021 Echo ([facility]): Systolic function mildly reduced  EF 45 % to 50 %, mildly increased LV wall, grade 1 diastolic dysfunction   No evidence of apical thrombus  Overnight events reviewed  Review of Systems   Constitutional: Positive for fatigue  Negative for appetite change  HENT: Negative for nosebleeds  Eyes: Negative for visual disturbance  Respiratory: Positive for chest tightness and shortness of breath  Cardiovascular: Positive for palpitations  Negative for chest pain and leg swelling  Gastrointestinal: Negative for abdominal pain and blood in stool  Endocrine: Negative for polyuria  Genitourinary: Positive for frequency  Negative for hematuria  Musculoskeletal: Negative for joint swelling  Skin: Negative for rash  Neurological: Negative for syncope  Psychiatric/Behavioral: Negative for sleep disturbance  Review of ten system was conducted and was negative except for findings stated elsewhere in the note  Cardiac catheterization:  1/17/2016 Cardiac Cath: LVEF 25-30 % and no significant evidence of CAD  Cardiac Diagnostics:  8/21/15: NM stress exercise: The hemodynamic response to exercise was normal  No cp with exercise  No ekg changes diagnostic for ischemia  Breast attenuation present  No evidence for ischemia on perfusion imaging  Normal LV systolic function  Recent nuclear stress was negative for ischemia  1/17/2016 Echocardiogram: LVEF 35%-40%  Trace MR and trace TR  Normal IVC  02/04/2016 Echocardiogram: LVEF 50% - no valvular disease  10/5/2016: Echo 2D Complete: Low Normal left ventricular systolic function  Mild concentric left ventricular hypertrophy  Trace mitral regurgitation  Visually Estimated LV Ejection Fraction is:50%  9/5/18 Echo: Normal left ventricular systolic function  There is apical lateral hypokinesis  Estimated left ventricular ejection fraction is 55%  Aortic sclerosis without stenosis  Mild mitral regurgitation  5/26/2021 Echo ([facility]): Systolic function mildly reduced  EF 45 % to 50 %, mildly increased LV wall, grade 1 diastolic dysfunction   No evidence of apical thrombus  10/11/2022: Nondiagnostic stress echo:  If symptoms persist, cath planned      Current Facility-Administered Medications:   •  acetaminophen (TYLENOL) tablet 650 mg, 650 mg, Oral, Q6H PRN, EMANUEL Finley  •  ALPRAZolam Deloria Sukhwinder) tablet 0 5 mg, 0 5 mg, Oral, Daily PRN, EMANUEL Finley  •  aluminum-magnesium hydroxide-simethicone (MYLANTA) oral suspension 30 mL, 30 mL, Oral, Q6H PRN, EMANUEL Finley  •  aspirin chewable tablet 81 mg, 81 mg, Oral, Daily, EMANUEL Finley, 81 mg at 01/29/23 1571  •  atorvastatin (LIPITOR) tablet 80 mg, 80 mg, Oral, QPM, EMANUEL Finley, 80 mg at 01/29/23 1813  •  docusate sodium (COLACE) capsule 100 mg, 100 mg, Oral, BID, EMANUEL Finley, 100 mg at 01/28/23 0857  •  enoxaparin (LOVENOX) subcutaneous injection 70 mg, 1 mg/kg, Subcutaneous, BID, EMANUEL Finley, 70 mg at 01/29/23 1812  •  furosemide (LASIX) injection 40 mg, 40 mg, Intravenous, BID, EMANUEL Finley, 40 mg at 01/29/23 1813  •  insulin glargine (LANTUS) subcutaneous injection 15 Units 0 15 mL, 15 Units, Subcutaneous, HS, EMANUEL Finley, 15 Units at 01/29/23 2153  •  insulin lispro (HumaLOG) 100 units/mL subcutaneous injection 1-5 Units, 1-5 Units, Subcutaneous, TID AC, 1 Units at 01/29/23 1642 **AND** Fingerstick Glucose (POCT), , , 4x Daily AC and at bedtime, EMANUEL Finley  •  insulin lispro (HumaLOG) 100 units/mL subcutaneous injection 1-5 Units, 1-5 Units, Subcutaneous, HS, EMANUEL Finley, 2 Units at 01/28/23 2122  •  insulin lispro (HumaLOG) 100 units/mL subcutaneous injection 6 Units, 6 Units, Subcutaneous, TID With Meals, EMANUEL Finley, 6 Units at 01/29/23 1641  •  metoprolol succinate (TOPROL-XL) 24 hr tablet 25 mg, 25 mg, Oral, HS, Skdelon Gill, DO, 25 mg at 01/29/23 2152  •  metoprolol succinate (TOPROL-XL) 24 hr tablet 50 mg, 50 mg, Oral, Daily, EMANUEL Song, 50 mg at 01/28/23 9451  •  nitroglycerin (NITROSTAT) SL tablet 0 4 mg, 0 4 mg, Sublingual, Q5 Min PRN, EMANUEL Chang     Objective:     Vitals:   Blood pressure 119/68, pulse 71, temperature 97 8 °F (36 6 °C), temperature source Oral, resp  rate 18, height 5' 4" (1 626 m), weight 71 9 kg (158 lb 9 6 oz), SpO2 97 %  Body mass index is 27 22 kg/m²  Orthostatic Blood Pressures    Flowsheet Row Most Recent Value   Blood Pressure 119/68 filed at 01/30/2023 0400   Patient Position - Orthostatic VS Lying filed at 01/30/2023 2211         Systolic (51GAC), OHU:833 , Min:109 , UDH:714     Diastolic (01DFF), DOK:01, Min:61, Max:78      Intake/Output Summary (Last 24 hours) at 1/30/2023 0823  Last data filed at 1/30/2023 0517  Gross per 24 hour   Intake 400 ml   Output 1400 ml   Net -1000 ml     Weight (last 2 days)     Date/Time Weight    01/30/23 0554 71 9 (158 6)    01/29/23 0600 72 (158 8)    01/28/23 0600 73 2 (161 4)            Physical Exam  Vitals reviewed  Constitutional:       General: She is not in acute distress  HENT:      Head: Normocephalic  Right Ear: External ear normal       Left Ear: External ear normal    Eyes:      General: No scleral icterus  Neck:      Vascular: No carotid bruit  Cardiovascular:      Rate and Rhythm: Normal rate and regular rhythm  Heart sounds: S1 normal and S2 normal  No murmur heard  Pulmonary:      Breath sounds: Decreased breath sounds present  No wheezing or rhonchi  Chest:      Chest wall: No tenderness  Abdominal:      General: There is no distension  Musculoskeletal:      Right lower leg: No edema  Left lower leg: No edema  Skin:     General: Skin is warm  Findings: No lesion  Neurological:      General: No focal deficit present  Mental Status: She is alert     Psychiatric:         Mood and Affect: Mood normal            Labs & Results:        Results from last 7 days   Lab Units 01/30/23  0444 01/29/23  0426 01/28/23  0448   WBC Thousand/uL 8 43 8 50 10 34*   HEMOGLOBIN g/dL 13 6 14 3 13 9   HEMATOCRIT % 42 6 45 0 40 8   PLATELETS Thousands/uL 223 246 232         Results from last 7 days   Lab Units 01/30/23  0444 01/29/23  0426 01/28/23  0448 01/27/23  1831   POTASSIUM mmol/L 4 0 3 3* 3 9 3 6   CHLORIDE mmol/L 105 103 104 103   CO2 mmol/L 26 27 24 22   BUN mg/dL 33* 34* 22 18   CREATININE mg/dL 1 00 1 13 0 93 0 89   CALCIUM mg/dL 9 1 9 1 9 0 9 9   ALK PHOS U/L  --   --  105* 137*   ALT U/L  --   --  21 26   AST U/L  --   --  20 25     Results from last 7 days   Lab Units 01/27/23  1831   INR  0 97   PTT seconds 27     Results from last 7 days   Lab Units 01/29/23  0426 01/28/23  0448   MAGNESIUM mg/dL 2 1 2 0     No results for input(s): NTBNP in the last 72 hours  Imaging Studies:     XR chest portable    Result Date: 1/28/2023  Narrative: CHEST INDICATION:   sob  COMPARISON:  2/4/2014 EXAM PERFORMED/VIEWS:  XR CHEST PORTABLE FINDINGS: Cardiomediastinal silhouette appears borderline enlarged with slight vascular accentuation  No pneumothorax or pleural effusion  Osseous structures appear within normal limits for patient age  Impression: Borderline cardiomegaly  Slight vascular congestion Workstation performed: [id number]     CTA ED chest PE Study    Result Date: 1/27/2023  Narrative: CTA - CHEST WITH IV CONTRAST - PULMONARY ANGIOGRAM INDICATION:   r/o PE  Per my review of the medical record, the patient presents with sudden onset shortness of breath and chest pain prior to admission, chest tightness  Tachycardia  Coughing but no fever  Diaphoretic  COMPARISON: CXR from today and from 2/4/2014  TECHNIQUE: CT angiogram timed for optimal opacification of the pulmonary arteries  Axial, sagittal, and coronal 2D reformats created from source data  Coronal 3D MIP postprocessing on the acquisition scanner  Radiation dose length product (DLP):  270 4 mGy-cm     Radiation dose exposure minimized using iterative reconstruction and automated exposure control  IV Contrast:  100 mL of iohexol (OMNIPAQUE)  FINDINGS: PULMONARY ARTERIES:  No pulmonary embolus  LUNGS:  Extensive septal thickening and extensive groundglass opacity AIRWAYS: No significant filling defects  PLEURA:  Trace effusions  HEART/GREAT VESSELS:  Normal for age  MEDIASTINUM AND SHEKHAR:  Small hiatal hernia  CHEST WALL AND LOWER NECK: Unremarkable  UPPER ABDOMEN:  Punctate nonobstructing bilateral renal calcification  OSSEOUS STRUCTURES:  Mild degenerative disease in the spine with mild curvature of the cervicothoracic junction  Impression: No pulmonary embolus  Extensive septal thickening and groundglass opacity due to interstitial and alveolar edema with trace effusions  Workstation performed: [id number]       Available cardiology studies, imaging and lab results independently reviewed today  This note was completed in part utilizing voice recognition software  Grammatical errors, random word insertion, spelling mistakes, occasional wrong word or "sound-alike" substitutions and incomplete sentences may be an occasional consequence of the system secondary to software limitations, ambient noise and hardware issues  At the time of dictation, efforts were made to edit, clarify and /or correct errors  Please read the chart carefully and recognize, using context, where substitutions have occurred  If you have any questions or concerns about the context, text or information contained within the body of this dictation, please contact myself, the provider, for further clarification

## 2023-01-30 NOTE — ASSESSMENT & PLAN NOTE
Wt Readings from Last 3 Encounters:   01/30/23 71 7 kg (158 lb)   12/07/22 74 4 kg (164 lb)   09/27/22 74 4 kg (164 lb)     Patient presented with acute onset GARCIA, SOB, and chest pressure  Reports GARCIA and SOB begin after walking around completing errands, SOB improves at rest  Per patient, was told to stop PO Lasix a few months ago and to only take PRN for leg swelling  · Echo 5/2021 - LVEF 45-50%, grade 1 DD  · CTA chest: No pulmonary embolus  Extensive septal thickening and groundglass opacity due to interstitial and alveolar edema with trace effusions  · BNP: 342  S/P 20 mg IV Lasix in ED  · ECHO today: EF 40%, moderately reduced systolic function with global longitudinal strain reduced -10%  Grade 1 diastolic dysfunction  There is abnormal septal motion consistent with LBBB     · Net negative urine output and decrease in weight, unclear of accuracy initially on admission  · Cardiology consulted:  · Continue IV Lasix 40mg BID for additional day, hopefully transition to PO diuretics next 24-48 hours  · Caution with soft/low BP and mild elevation in creatinine yesterday, improved  · 1800 mL Fluid restriction   · Daily weights, I&Os

## 2023-01-30 NOTE — ASSESSMENT & PLAN NOTE
POA with SpO2 down to 90% on room air, subsequently placed on 5 L NC O2    · Likely in setting of acute CHF exacerbation  · Not previously on home O2   · BC x2: NGTD (24 hrs)  · Currently on 2L NC O2, titrate as able  · Likely will need desat screening prior to discharge  · Respiratory protocol

## 2023-01-30 NOTE — ASSESSMENT & PLAN NOTE
Patient reported mid sternal chest pressure that began with sudden onset GARCIA and SOB  S/p 325 mg ASA and Nitro paste in ED with resolution of pain    · Hx of Prinzmetal Angina - Recent stress test 10/2021 with no ischemia noted  · Noted to be tachycardic in the 140s, Improved after 15 mg IV Cardizem  · HS trop: 9->14->21  · Cardiology following:  · Likely due to demand from [facility] CHF, less likely suspect ACS  · Added Toprol 25 mg qHS, D/C amlodipine   · Consider adding Imdur if BP allows  · Monitor on telemetry

## 2023-01-30 NOTE — CASE MANAGEMENT
Case Management Assessment    Patient name Iqra Hare  Location /-99 MRN [id number]  : 1960 Date 2023       Current Admission Date: 2023  Current Admission Diagnosis:Acute on chronic combined systolic and diastolic congestive heart failure [facility])   Patient Active Problem List    Diagnosis Date Noted   • Acute respiratory failure with hypoxia ([facility] ) 2023   • Deep vein thrombosis (DVT) of left lower extremity ([facility] ) 2023   • Chest pain 2023   • Acute on chronic combined systolic and diastolic congestive heart failure ([facility] ) 2022   • Type 2 diabetes mellitus treated with insulin ([place] [id number] ) 2022   • Overweight (BMI 25 0-29 9) 2022   • Essential hypertension 2021   • Blurry vision 2021   • Diarrhea 2021   • HLD (hyperlipidemia) 2021   • Nonsustained paroxysmal ventricular tachycardia 2021   • Word finding difficulty 2021   • Hypertensive urgency 2021   • Hyponatremia in the setting of hyperglycemia 2021   • Prolonged QT interval 2021   • Harvey's neuroma of second interspace of right foot 2019   • Right foot pain 2019   • Hammer toe of right foot 2019   • Acquired deformity of foot 2019   • Pain in both feet 2019   • Congenital pes planus of right foot 2019   • Congenital pes planus of left foot 2019   • Hammer toe of left foot 2019   • 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([facility] ) 2016   • Prinzmetal's angina ([facility] ) 10/28/2013      LOS (days): 3  Geometric Mean LOS (GMLOS) (days):   Days to GMLOS:     OBJECTIVE:    Risk of Unplanned Readmission Score: 13 06         Current admission status: Inpatient       Preferred Pharmacy:   [facility] [id number] Located within [facility]  [id number] Located within [facility]  [address]  Phone: [phone] Fax: [phone]    Primary Care Provider: Richard Kat MD    Primary Insurance: Corwin Jones Insurance: ASSESSMENT:  Active Health Care Proxies    There are no active Health Care Proxies on file  Readmission Root Cause  30 Day Readmission: No    Patient Information  Admitted from[de-identified] Home  Mental Status: Alert  During Assessment patient was accompanied by: Not accompanied during assessment  Assessment information provided by[de-identified] Patient  Primary Caregiver: Self  Support Systems: Family members, Daughter  Home entry access options   Select all that apply : Stairs  Number of steps to enter home : 5  Do the steps have railings?: Yes  Type of Current Residence: 2 story home  Upon entering residence, is there a bedroom on the main floor (no further steps)?: No  A bedroom is located on the following floor levels of residence (select all that apply):: 2nd Floor  Upon entering residence, is there a bathroom on the main floor (no further steps)?: No  Number of steps to 2nd floor from main floor: One Flight  In the last 12 months, was there a time when you were not able to pay the mortgage or rent on time?: No  In the last 12 months, was there a time when you did not have a steady place to sleep or slept in a shelter (including now)?: No  Homeless/housing insecurity resource given?: N/A  Living Arrangements: Lives w/ Daughter, Lives w/ Extended Family    Activities of Daily Living Prior to Admission  Functional Status: Independent  Completes ADLs independently?: Yes  Ambulates independently?: Yes  Does patient use assisted devices?: No  Does patient currently own DME?: No  Does patient have a history of Outpatient Therapy (PT/OT)?: No  Does the patient have a history of Short-Term Rehab?: No  Does patient have a history of HH?: No  [facility]         Patient Information Continued  Income Source: Pension/long-term  Does patient have prescription coverage?: Yes  Within the past 12 months, you worried that your food would run out before you got the money to buy more : Never true  Within the past 12 months, the food you bought just didn't last and you didn't have money to get more : Never true  Food insecurity resource given?: N/A  Does patient receive dialysis treatments?: No  Does patient have a history of substance abuse?: No         Means of Transportation  [facility][de[facility]] Family transport  In the past 12 months, has lack of transportation kept you from medical appointments or from getting medications?: No  In the past 12 months, has lack of transportation kept you from meetings, work, or from getting things needed for daily living?: No  Was application for public transport provided?: N/A

## 2023-01-30 NOTE — ASSESSMENT & PLAN NOTE
Lab Results   Component Value Date    HGBA1C 7 1 (H) 12/05/2022       Recent Labs     01/29/23  1121 01/29/23  1621 01/29/23 2058 01/30/23  0707   POCGLU 213* 161* 99 129       Blood Sugar Average: Last 72 hrs:  (P) 791 [id number]     · Hold home Xigduo while in hospital  · Continue home Lantus 15 units HS and Humalog 6 units TID with meals  · SSI AC/HS with Accu-checks  · Hypoglycemia protocol

## 2023-01-30 NOTE — PROGRESS NOTES
I[facility]  66   Progress Note - Sidney Iniguez 1960, 58 y o  female MRN: [id number]  Unit/Bed#: -01 Encounter: [id number]  Primary Care Provider: Buster Cheung MD   Date and time admitted to hospital: 1/27/2023  6:02 PM    * Acute on chronic combined systolic and diastolic congestive heart failure [facility])  Assessment & Plan  Wt Readings from Last 3 Encounters:   01/30/23 71 7 kg (158 lb)   12/07/22 74 4 kg (164 lb)   09/27/22 74 4 kg (164 lb)     Patient presented with acute onset GARCIA, SOB, and chest pressure  Reports GARCIA and SOB begin after walking around completing errands, SOB improves at rest  Per patient, was told to stop PO Lasix a few months ago and to only take PRN for leg swelling  · Echo 5/2021 - LVEF 45-50%, grade 1 DD  · CTA chest: No pulmonary embolus  Extensive septal thickening and groundglass opacity due to interstitial and alveolar edema with trace effusions  · BNP: 342  S/P 20 mg IV Lasix in ED  · ECHO today: EF 40%, moderately reduced systolic function with global longitudinal strain reduced -10%  Grade 1 diastolic dysfunction  There is abnormal septal motion consistent with LBBB     · Net negative urine output and decrease in weight, unclear of accuracy initially on admission  · Cardiology consulted:  · Continue IV Lasix 40mg BID for additional day, hopefully transition to PO diuretics next 24-48 hours  · Caution with soft/low BP and mild elevation in creatinine yesterday, improved  · 1800 mL Fluid restriction   · Daily weights, I&Os    Acute respiratory failure with hypoxia (HCC)  Assessment & Plan  POA with SpO2 down to 90% on room air, subsequently placed on 5 L NC O2    · Likely in setting of acute CHF exacerbation  · Not previously on home O2   · BC x2: NGTD (24 hrs)  · Currently on 2L NC O2, titrate as able  · Likely will need desat screening prior to discharge  · Respiratory protocol    Deep vein thrombosis (DVT) of left lower extremity [facility])  Assessment & Plan  Patient reports posterior pain in left leg, worse with movement  Pain begins behind knee/upper calf and runs to mid thigh  · D-dimer: 0 87  Elevated H/H at 17 7/52 1 POA  · Check LLE duplex to r/o DVT  · C/w therapeutic Lovenox   · Noted decrease in Hgb from 17 7->13 9  Possibly some fluid shifting vs lab error, but no acute bleed suspected  Chest pain  Assessment & Plan  Patient reported mid sternal chest pressure that began with sudden onset GARCIA and SOB  S/p 325 mg ASA and Nitro paste in ED with resolution of pain    · Hx of Prinzmetal Angina - Recent stress test 10/2021 with no ischemia noted  · Noted to be tachycardic in the 140s, Improved after 15 mg IV Cardizem  · HS trop: 9->14->21  · Cardiology following:  · Likely due to demand from [facility] CHF, less likely suspect ACS  · Added Toprol 25 mg qHS, D/C amlodipine   · Consider adding Imdur if BP allows  · Monitor on telemetry     Prolonged QT interval  Assessment & Plan  · QTc 556 on admission, repeat today 510  · Hold QT prolonging agents  · Monitor EKG/QTc closely    Type 2 diabetes mellitus treated with insulin [facility])  Assessment & Plan  Lab Results   Component Value Date    HGBA1C 7 1 (H) 12/05/2022       Recent Labs     01/29/23  1121 01/29/23  1621 01/29/23  2058 01/30/23  0707   POCGLU 213* 161* 99 129       Blood Sugar Average: Last 72 hrs:  (P) 753 [id number]     · Hold home Xigduo while in hospital  · Continue home Lantus 15 units HS and Humalog 6 units TID with meals  · SSI AC/HS with Accu-checks  · Hypoglycemia protocol     Essential hypertension  Assessment & Plan  · BP reviewed, low/soft this a m  but improving  · Continue home Amlodipine and Metoprolol with holding parameters  · Monitor BP closely while requiring IV diuretics   · Low salt diet    Nonsustained paroxysmal ventricular tachycardia  Assessment & Plan  · Hx of NPVT, follows outpatient with cardiology  · Stress test 10/2022 with no ischemia noted  · 1 run of Vtach noted in ED, self resolved  · ST 140s in ED,  improved s/p 15 mg IV Cardizem   · Continue Metoprolol      VTE Pharmacologic Prophylaxis: VTE Score: 9 High Risk (Score >/= 5) - Pharmacological DVT Prophylaxis Ordered: enoxaparin (Lovenox)  Sequential Compression Devices Ordered  Patient Centered Rounds: I performed bedside rounds with nursing staff today  Discussions with Specialists or Other Care Team Provider: Cardiology, case management    Education and Discussions with Family / Patient: Patient declined call to   Time Spent for Care: 45 minutes  More than 50% of total time spent on counseling and coordination of care as described above  Current Length of Stay: 3 day(s)  Current Patient Status: Inpatient   Certification Statement: The patient will continue to require additional inpatient hospital stay due to Venous duplex ultrasound results, IV diuretics  Discharge Plan: Anticipate discharge in 24-48 hrs to home  Code Status: Level 1 - Full Code    Subjective:   Patient is seen at bedside this a m , reports improvement in SOB and left leg pain, otherwise no acute complaints  Objective:     Vitals:   Temp (24hrs), Av 1 °F (36 7 °C), Min:97 6 °F (36 4 °C), Max:98 9 °F (37 2 °C)    Temp:  [97 6 °F (36 4 °C)-98 9 °F (37 2 °C)] 97 8 °F (36 6 °C)  HR:  [65-79] 65  Resp:  [16-18] 16  BP: (110-121)/(61-78) 117/73  SpO2:  [96 %-98 %] 97 %  Body mass index is 27 12 kg/m²  Input and Output Summary (last 24 hours): Intake/Output Summary (Last 24 hours) at 2023 1143  Last data filed at 2023 0517  Gross per 24 hour   Intake 400 ml   Output 1400 ml   Net -1000 ml       Physical Exam:   Physical Exam  Constitutional:       General: She is not in acute distress  Appearance: She is not ill-appearing, toxic-appearing or diaphoretic  Cardiovascular:      Rate and Rhythm: Normal rate and regular rhythm  Pulses: Normal pulses        Heart sounds: Normal heart sounds  Pulmonary:      Effort: Pulmonary effort is normal  No respiratory distress  Breath sounds: Normal breath sounds  Abdominal:      General: Bowel sounds are normal  There is no distension  Tenderness: There is no abdominal tenderness  Musculoskeletal:         General: No swelling or tenderness  Skin:     General: Skin is warm  Neurological:      General: No focal deficit present  Mental Status: She is alert  Psychiatric:         Mood and Affect: Mood normal          Behavior: Behavior normal         Additional Data:     Labs:  Results from last 7 days   Lab Units 01/30/23  0444 01/29/23 0426 01/28/23  0448   WBC Thousand/uL 8 43   < > 10 34*   HEMOGLOBIN g/dL 13 6   < > 13 9   HEMATOCRIT % 42 6   < > 40 8   PLATELETS Thousands/uL 223   < > 232   NEUTROS PCT %  --   --  78*   LYMPHS PCT %  --   --  17   MONOS PCT %  --   --  4   EOS PCT %  --   --  1    < > = values in this interval not displayed  Results from last 7 days   Lab Units 01/30/23 0444 01/29/23 0426 01/28/23 0448   SODIUM mmol/L 141   < > 139   POTASSIUM mmol/L 4 0   < > 3 9   CHLORIDE mmol/L 105   < > 104   CO2 mmol/L 26   < > 24   BUN mg/dL 33*   < > 22   CREATININE mg/dL 1 00   < > 0 93   ANION GAP mmol/L 10   < > 11   CALCIUM mg/dL 9 1   < > 9 0   ALBUMIN g/dL  --   --  4 1   TOTAL BILIRUBIN mg/dL  --   --  0 74   ALK PHOS U/L  --   --  105*   ALT U/L  --   --  21   AST U/L  --   --  20   GLUCOSE RANDOM mg/dL 130   < > 146*    < > = values in this interval not displayed       Results from last 7 days   Lab Units 01/27/23  1831   INR  0 97     Results from last 7 days   Lab Units 01/30/23  1104 01/30/23  0707 01/29/23  2058 01/29/23  1621 01/29/23  1121 01/29/23  0659 01/28/23  2021 01/28/23  1859 01/28/23  1631 01/28/23  1056 01/28/23  0723 01/27/23  2149   POC GLUCOSE mg/dl 159* 129 99 161* 213* 130 214* 214* 76 131 143* 189*               Lines/Drains:  Invasive Devices     Peripheral Intravenous Line Duration           Peripheral IV 01/27/23 Left;Ventral (anterior) Forearm 2 days                  Telemetry:  Telemetry Orders (From admission, onward)             48 Hour Telemetry Monitoring  Continuous x 48 hours        References:    Telemetry Guidelines   Question:  Reason for 48 Hour Telemetry  Answer:  Arrhythmias Requiring Medical Therapy (eg  SVT, Vtach/fib, Bradycardia, Uncontrolled A-fib)                 Telemetry Reviewed: Normal Sinus Rhythm and PVCs  Indication for Continued Telemetry Use: Arrthymias requiring medical therapy             Imaging: Reviewed radiology reports from this admission including: ECHO    Recent Cultures (last 7 days):   Results from last 7 days   Lab Units 01/27/23  1940 01/27/23  1831   BLOOD CULTURE  No Growth at 48 hrs  No Growth at 48 hrs         Last 24 Hours Medication List:   Current Facility-Administered Medications   Medication Dose Route Frequency Provider Last Rate   • acetaminophen  650 mg Oral Q6H PRN EMANUEL Arcos     • ALPRAZolam  0 5 mg Oral Daily PRN EMANUEL Arcos     • aluminum-magnesium hydroxide-simethicone  30 mL Oral Q6H PRN EMANUEL Arcos     • aspirin  81 mg Oral Daily Mathew Darling, [address]     • atorvastatin  80 mg Oral QPM EMANUEL Almendarez     • docusate sodium  100 mg Oral BID EMANUEL Arcos     • enoxaparin  1 mg/kg Subcutaneous BID EMANUEL Song     • furosemide  40 mg Intravenous BID EMANUEL Arcos     • insulin glargine  15 Units Subcutaneous HS EMANUEL Song     • insulin lispro  1-5 Units Subcutaneous TID AC EMANUEL Donahue     • insulin lispro  1-5 Units Subcutaneous HS EMANUEL Almendarez     • insulin lispro  6 Units Subcutaneous TID With Meals EMANUEL Arcos     • metoprolol succinate  25 mg Oral HS Neri Aleman, DO     • metoprolol succinate  50 mg Oral Daily Mathew Douglass • nitroglycerin  0 4 mg Sublingual Q5 Min PRN EMANUEL Sky          Today, Patient Was Seen By: Serjio Capellan PA-C    **Please Note: This note may have been constructed using a voice recognition system  **

## 2023-01-30 NOTE — PLAN OF CARE
Problem: Potential for Falls  Goal: Patient will remain free of falls  Description: INTERVENTIONS:  - Educate patient/family on patient safety including physical limitations  - Instruct patient to call for assistance with activity   - Consult OT/PT to assist with strengthening/mobility   - Keep Call bell within reach  - Keep bed low and locked with side rails adjusted as appropriate  - Keep care items and personal belongings within reach  - Initiate and maintain comfort rounds  - Make Fall Risk Sign visible to staff  - Offer Toileting every 2 Hours, in advance of need  - Apply yellow socks and bracelet for high fall risk patients  - Consider moving patient to room near nurses station  Outcome: Progressing     Problem: PAIN - ADULT  Goal: Verbalizes/displays adequate comfort level or baseline comfort level  Description: Interventions:  - Encourage patient to monitor pain and request assistance  - Assess pain using appropriate pain scale  - Administer analgesics based on type and severity of pain and evaluate response  - Implement non-pharmacological measures as appropriate and evaluate response  - Consider cultural and social influences on pain and pain management  - Notify physician/advanced practitioner if interventions unsuccessful or patient reports new pain  Outcome: Progressing     Problem: INFECTION - ADULT  Goal: Absence or prevention of progression during hospitalization  Description: INTERVENTIONS:  - Assess and monitor for signs and symptoms of infection  - Monitor lab/diagnostic results  - Monitor all insertion sites, i e  indwelling lines, tubes, and drains  - Monitor endotracheal if appropriate and nasal secretions for changes in amount and color  - [place] appropriate cooling/warming therapies per order  - Administer medications as ordered  - Instruct and encourage patient and family to use good hand hygiene technique  - Identify and instruct in appropriate isolation precautions for identified infection/condition  Outcome: Progressing  Goal: Absence of fever/infection during neutropenic period  Description: INTERVENTIONS:  - Monitor WBC    Outcome: Progressing     Problem: SAFETY ADULT  Goal: Patient will remain free of falls  Description: INTERVENTIONS:  - Educate patient/family on patient safety including physical limitations  - Instruct patient to call for assistance with activity   - Consult OT/PT to assist with strengthening/mobility   - Keep Call bell within reach  - Keep bed low and locked with side rails adjusted as appropriate  - Keep care items and personal belongings within reach  - Initiate and maintain comfort rounds  - Make Fall Risk Sign visible to staff  - Offer Toileting every 2 Hours, in advance of need  - Apply yellow socks and bracelet for high fall risk patients  - Consider moving patient to room near nurses station  Outcome: Progressing  Goal: Maintain or return to baseline ADL function  Description: INTERVENTIONS:  -  Assess patient's ability to carry out ADLs; assess patient's baseline for ADL function and identify physical deficits which impact ability to perform ADLs (bathing, care of mouth/teeth, toileting, grooming, dressing, etc )  - Assess/evaluate cause of self-care deficits   - Assess range of motion  - Assess patient's mobility; develop plan if impaired  - Assess patient's need for assistive devices and provide as appropriate  - Encourage maximum independence but intervene and supervise when necessary  - Involve family in performance of ADLs  - Assess for home care needs following discharge   - Consider OT consult to assist with ADL evaluation and planning for discharge  - Provide patient education as appropriate  Outcome: Progressing       Problem: DISCHARGE PLANNING  Goal: Discharge to home or other facility with appropriate resources  Description: INTERVENTIONS:  - Identify barriers to discharge w/patient and caregiver  - Arrange for needed discharge resources and transportation as appropriate  - Identify discharge learning needs (meds, wound care, etc )  - Arrange for interpretive services to assist at discharge as needed  - Refer to Case Management Department for coordinating discharge planning if the patient needs post-hospital services based on physician/advanced practitioner order or complex needs related to functional status, cognitive ability, or social support system  Outcome: Progressing     Problem: Knowledge Deficit  Goal: Patient/family/caregiver demonstrates understanding of disease process, treatment plan, medications, and discharge instructions  Description: Complete learning assessment and assess knowledge base    Interventions:  - Provide teaching at level of understanding  - Provide teaching via preferred learning methods  Outcome: Progressing     Problem: CARDIOVASCULAR - ADULT  Goal: Maintains optimal cardiac output and hemodynamic stability  Description: INTERVENTIONS:  - Monitor I/O, vital signs and rhythm  - Monitor for S/S and trends of decreased cardiac output  - Administer and titrate ordered vasoactive medications to optimize hemodynamic stability  - Assess quality of pulses, skin color and temperature  - Assess for signs of decreased coronary artery perfusion  - Instruct patient to report change in severity of symptoms  Outcome: Progressing  Goal: Absence of cardiac dysrhythmias or at baseline rhythm  Description: INTERVENTIONS:  - Continuous cardiac monitoring, vital signs, obtain 12 lead EKG if ordered  - Administer antiarrhythmic and heart rate control medications as ordered  - Monitor electrolytes and administer replacement therapy as ordered  Outcome: Progressing     Problem: RESPIRATORY - ADULT  Goal: Achieves optimal ventilation and oxygenation  Description: INTERVENTIONS:  - Assess for changes in respiratory status  - Assess for changes in mentation and behavior  - Position to facilitate oxygenation and minimize respiratory effort  - Oxygen administered by appropriate delivery if ordered  - Initiate smoking cessation education as indicated  - Encourage broncho-pulmonary hygiene including cough, deep breathe, Incentive Spirometry  - Assess the need for suctioning and aspirate as needed  - Assess and instruct to report SOB or any respiratory difficulty  - Respiratory Therapy support as indicated  Outcome: Progressing

## 2023-01-30 NOTE — ASSESSMENT & PLAN NOTE
Patient reports posterior pain in left leg, worse with movement  Pain begins behind knee/upper calf and runs to mid thigh  · D-dimer: 0 87  Elevated H/H at 17 7/52 1 POA  · Check LLE duplex to r/o DVT  · C/w therapeutic Lovenox   · Noted decrease in Hgb from 17 7->13 9  Possibly some fluid shifting vs lab error, but no acute bleed suspected

## 2023-01-30 NOTE — UTILIZATION REVIEW
Continued Stay Review  Date: 1/30/23 Monday                        Current Patient Class: Inpatient    Current Level of Care: Acute Med Surg     HPI:  57 y/o female with PMHx HTN, CHF, DM - initially admitted on 1/27/23 2nd Acute Respiratory Failure with Hypoxia 2nd Acute on Chronic combined Systolic and Diastolic CHF  1/30/23:  improvement in SOB and left leg pain  SpO2 > 96 % w/ 2 L NC O2    Acute on chronic combined systolic and diastolic congestive heart failure (HCC)  Wt Readings from Last 3 Encounters:   01/30/23 71 7 kg (158 lb)   12/07/22 74 4 kg (164 lb)   09/27/22 74 4 kg (164 lb)      Presented with acute onset GARCIA, SOB, and chest pressure after walking around completing errands, SOB improves at rest  Per patient, was told to stop PO Lasix a few months ago and to only take PRN for leg swelling  • Echo 5/2021 - LVEF 45-50%, grade 1 DD  • CTA chest: No pulmonary embolus  Extensive septal thickening and groundglass opacity due to interstitial and alveolar edema with trace effusions  • BNP: 342  S/P 20 mg IV Lasix in ED  • ECHO today: EF 40%, moderately reduced systolic function with global longitudinal strain reduced -10%  Grade 1 diastolic dysfunction  There is abnormal septal motion consistent with LBBB  • Net negative urine output and decrease in weight, unclear of accuracy initially on admission  ? Continue IV Lasix 40mg BID for additional day, hopefully transition to PO diuretics next 24-48 hours  ?  Caution with soft/low BP and mild elevation in creatinine yesterday, improved  • 1800 mL Fluid restriction   • Daily weights, I&Os     Acute respiratory failure with hypoxia  POA with SpO2 down to 90% on room air, subsequently placed on 5 L NC O2    • Likely in setting of acute CHF exacerbation  • Not previously on home O2   • BC x2: NGTD (24 hrs)  • Currently on 2L NC O2, titrate as able  • Likely will need desat screening prior to discharge  • Respiratory protocol    Vital Signs:   Date/Time Temp Pulse Resp BP MAP (mmHg) SpO2 Calculated FIO2 (%) - Nasal Cannula O2 Flow Rate (L/min) Nasal Cannula O2 (L/min) O2 Device Patient Position - Orthostatic VS   01/30/23 1550 97 9 °F (36 6 °C) 64 16 131/70 -- 97 % -- -- 2 L Nasal  cannula Lying   01/30/23 1123 97 8 °F (36 6 °C) 65 16 117/73 -- 97 % -- -- -- -- Lying   01/30/23 0828 97 6 °F (36 4 °C) 68 16 111/70 -- 98 % 28 -- 2 L/min Nasal cannula Sitting   01/30/23 0400 97 8 °F (36 6 °C) 71 18 119/68 -- 97 % -- -- -- Nasal cannula Lying   01/29/23 2230 97 9 °F (36 6 °C) 75 18 121/61 -- 96 % -- -- -- Nasal cannula Lying   01/29/23 2015 98 9 °F (37 2 °C) 78 17 120/78 94 97 % 28 -- 2 L/min Nasal cannula Lying   01/29/23 1936 97 8 °F (36 6 °C) 79 18 117/66 -- 98 % -- -- -- Nasal cannula Lying   01/29/23 1811 -- 70 -- 110/70 -- -- -- -- -- -- Lying   01/29/23 1500 98 7 °F (37 1 °C) 74 18 115/72 -- 96 % -- -- -- -- Sitting   01/29/23 1119 97 5 °F (36 4 °C) 66 16 109/62 -- 97 % -- -- -- -- Sitting   01/29/23 0805 -- 66 -- 103/60 -- -- -- -- -- -- Lying   01/29/23 0749 98 °F (36 7 °C) 62 16 98/49 Abnormal  -- 99 % -- -- -- -- Lying   01/29/23 0401 98 3 °F (36 8 °C) 62 18 108/69 88 97 % -- 3 L/min -- -- --   01/28/23 2354 98 °F (36 7 °C) 68 16 112/68 -- 98 % -- 4 L/min -- Nasal cannula --   01/28/23 2216 98 °F (36 7 °C) 76 16 109/67 -- 95 % -- -- -- -- Lying   01/28/23 1916 98 6 °F (37 °C) 79 16 120/66 -- 97 % -- -- -- -- Lying   01/28/23 1715 -- 72 -- 115/67 -- -- -- -- -- -- Sitting   01/28/23 1553 98 3 °F (36 8 °C) 70 17 116/69 -- 95 % -- -- -- -- Lying   01/28/23 1121 97 7 °F (36 5 °C) 63 20 105/63 -- 98 % -- -- -- -- Lying   01/28/23 1015 -- -- -- -- -- 95 % 40 -- 5 L/min Nasal cannula --   01/28/23 1010 -- -- -- -- -- 85 % Abnormal  36 -- 4 L/min Nasal cannula --   01/28/23 0857 -- 75 -- 116/64 -- -- -- -- -- -- --   01/28/23 0745 97 7 °F (36 5 °C) 74 16 116/67 -- 99 % -- -- -- -- Lying   01/28/23 0400 97 7 °F (36 5 °C) 77 14 118/81 92 97 % 36 -- 4 L/min Nasal cannula Lying   01/28/23 0127 -- -- -- 102/64 -- -- -- -- -- -- Lying   01/28/23 0120 97 6 °F (36 4 °C) -- 18 -- -- 96 % 36 -- 4 L/min Nasal cannula --       01/28 0701 01/29 0700 01/29 0701 01/30 0700   P  O  1080 400   Total Intake(mL/kg) 1080 (15) 400 (5 6)   Urine (mL/kg/hr) 2200 (1 3) 1400 (0 8)   Total Output 2200 1400   Net -1120 -1000        Unmeasured Urine Occurrence  2 x     Pertinent Labs/Diagnostic Results:   Results from last 7 days   Lab Units 01/27/23  1831   SARS-COV-2  Negative     Results from last 7 days   Lab Units 01/30/23 0444 01/29/23  0426 01/28/23 0448 01/27/23  1831   WBC Thousand/uL 8 43 8 50 10 34* 11 47*   HEMOGLOBIN g/dL 13 6 14 3 13 9 17 7*   HEMATOCRIT % 42 6 45 0 40 8 52 1*   PLATELETS Thousands/uL 223 246 232 313   NEUTROS ABS Thousands/µL  --   --  7 99* 7 00     Results from last 7 days   Lab Units 01/30/23  0444 01/29/23  0426 01/28/23 0448 01/27/23  1831   SODIUM mmol/L 141 140 139 138   POTASSIUM mmol/L 4 0 3 3* 3 9 3 6   CHLORIDE mmol/L 105 103 104 103   CO2 mmol/L 26 27 24 22   ANION GAP mmol/L 10 10 11 13   BUN mg/dL 33* 34* 22 18   CREATININE mg/dL 1 00 1 13 0 93 0 89   EGFR ml/min/1 73sq m 60 52 66 69   CALCIUM mg/dL 9 1 9 1 9 0 9 9   MAGNESIUM mg/dL  --  2 1 2 0  --      Results from last 7 days   Lab Units 01/28/23  0448 01/27/23  1831   AST U/L 20 25   ALT U/L 21 26   ALK PHOS U/L 105* 137*   TOTAL PROTEIN g/dL 7 0 8 9*   ALBUMIN g/dL 4 1 5 0   TOTAL BILIRUBIN mg/dL 0 74 0 71     Results from last 7 days   Lab Units 01/30/23  1104 01/30/23  0707 01/29/23 2058 01/29/23  1621 01/29/23  1121 01/29/23  0659 01/28/23  2021 01/28/23  1859 01/28/23  1631 01/28/23  1056 01/28/23  0723 01/27/23  2149   POC GLUCOSE mg/dl 159* 129 99 161* 213* 130 214* 214* 76 131 143* 189*     Results from last 7 days   Lab Units 01/30/23  0444 01/29/23  0426 01/28/23  0448 01/27/23  1831   GLUCOSE RANDOM mg/dL 130 127 146* 159*     Diet Cardiovascular; Sodium 2 GM;  Fluid Restriction 1800 ML, Consistent Carbohydrate Diet Level 3 (6 carb servings/ 90 Grams CHO/meal)     Scheduled Medications:  aspirin, 81 mg, Oral, Daily  atorvastatin, 80 mg, Oral, QPM  docusate sodium, 100 mg, Oral, BID  [START ON 1/31/2023] enoxaparin, 40 mg, Subcutaneous, Q24H LEANDRO  IV furosemide, 40 mg, Intravenous, BID  insulin glargine, 15 Units, Subcutaneous, HS  insulin lispro, 1-5 Units, Subcutaneous, TID AC  insulin lispro, 1-5 Units, Subcutaneous, HS  insulin lispro, 6 Units, Subcutaneous, TID With Meals  metoprolol succinate, 25 mg, Oral, HS  metoprolol succinate, 50 mg, Oral, Daily    Continuous IV Infusions:  NONE    PRN Meds:  acetaminophen, 650 mg, Oral, Q6H PRN  ALPRAZolam, 0 5 mg, Oral, Daily PRN  aluminum-magnesium hydroxide-simethicone, 30 mL, Oral, Q6H PRN  nitroglycerin, 0 4 mg, Sublingual, Q5 Min PRN    Discharge Plan: To be determined   Inpatient Case Management following for all discharge needs    Network Utilization Review Department  ATTENTION: Please call with any questions or concerns to [phone] and carefully listen to the prompts so that you are directed to the right person  All voicemails are confidential   [place] all requests for admission clinical reviews, approved or denied determinations and any other requests to dedicated fax number below belonging to the campus where the patient is receiving treatment   List of dedicated fax numbers for the Facilities:  [address] DENIALS (Administrative/Medical Necessity) [phone]   [address] [place] [address] (Maternity/NICU/Pediatrics) [phone]   [address] [phone]   [address] [address] [facility] [place] [address] [place] [address] [address] [place] [address] [address] [place] [address] 838 [facility] [phone]

## 2023-01-31 VITALS
SYSTOLIC BLOOD PRESSURE: 115 MMHG | TEMPERATURE: 97.6 F | WEIGHT: 158.4 LBS | RESPIRATION RATE: 14 BRPM | BODY MASS INDEX: 27.04 KG/M2 | HEART RATE: 72 BPM | DIASTOLIC BLOOD PRESSURE: 74 MMHG | HEIGHT: 64 IN | OXYGEN SATURATION: 94 %

## 2023-01-31 PROBLEM — J96.01 ACUTE RESPIRATORY FAILURE WITH HYPOXIA (HCC): Status: RESOLVED | Noted: 2023-01-29 | Resolved: 2023-01-31

## 2023-01-31 LAB
ANION GAP SERPL CALCULATED.3IONS-SCNC: 11 MMOL/L (ref 4–13)
BUN SERPL-MCNC: 35 MG/DL (ref 5–25)
CALCIUM SERPL-MCNC: 9.3 MG/DL (ref 8.4–10.2)
CHLORIDE SERPL-SCNC: 103 MMOL/L (ref 96–108)
CO2 SERPL-SCNC: 26 MMOL/L (ref 21–32)
CREAT SERPL-MCNC: 1.05 MG/DL (ref 0.6–1.3)
GFR SERPL CREATININE-BSD FRML MDRD: 57 ML/MIN/1.73SQ M
GLUCOSE SERPL-MCNC: 109 MG/DL (ref 65–140)
GLUCOSE SERPL-MCNC: 119 MG/DL (ref 65–140)
GLUCOSE SERPL-MCNC: 125 MG/DL (ref 65–140)
POTASSIUM SERPL-SCNC: 3.6 MMOL/L (ref 3.5–5.3)
SODIUM SERPL-SCNC: 140 MMOL/L (ref 135–147)

## 2023-01-31 RX ORDER — FUROSEMIDE 40 MG/1
40 TABLET ORAL DAILY
Qty: 30 TABLET | Refills: 0 | Status: SHIPPED | OUTPATIENT
Start: 2023-02-01

## 2023-01-31 RX ORDER — FUROSEMIDE 40 MG/1
40 TABLET ORAL EVERY OTHER DAY
Status: DISCONTINUED | OUTPATIENT
Start: 2023-02-01 | End: 2023-01-31

## 2023-01-31 RX ORDER — METOPROLOL SUCCINATE 25 MG/1
25 TABLET, EXTENDED RELEASE ORAL
Qty: 30 TABLET | Refills: 0 | Status: SHIPPED | OUTPATIENT
Start: 2023-01-31

## 2023-01-31 RX ORDER — FUROSEMIDE 40 MG/1
40 TABLET ORAL DAILY
Status: DISCONTINUED | OUTPATIENT
Start: 2023-02-01 | End: 2023-01-31 | Stop reason: HOSPADM

## 2023-01-31 RX ORDER — POTASSIUM CHLORIDE 20 MEQ/1
20 TABLET, EXTENDED RELEASE ORAL DAILY
Qty: 14 TABLET | Refills: 0 | Status: SHIPPED | OUTPATIENT
Start: 2023-02-01

## 2023-01-31 RX ORDER — METOPROLOL SUCCINATE 50 MG/1
50 TABLET, EXTENDED RELEASE ORAL DAILY
Qty: 30 TABLET | Refills: 0 | Status: SHIPPED | OUTPATIENT
Start: 2023-02-01

## 2023-01-31 RX ADMIN — INSULIN LISPRO 6 UNITS: 100 INJECTION, SOLUTION INTRAVENOUS; SUBCUTANEOUS at 09:26

## 2023-01-31 RX ADMIN — DOCUSATE SODIUM 100 MG: 100 CAPSULE, LIQUID FILLED ORAL at 09:26

## 2023-01-31 RX ADMIN — ENOXAPARIN SODIUM 40 MG: 40 INJECTION SUBCUTANEOUS at 09:26

## 2023-01-31 RX ADMIN — FUROSEMIDE 40 MG: 10 INJECTION, SOLUTION INTRAMUSCULAR; INTRAVENOUS at 09:26

## 2023-01-31 RX ADMIN — INSULIN LISPRO 6 UNITS: 100 INJECTION, SOLUTION INTRAVENOUS; SUBCUTANEOUS at 12:15

## 2023-01-31 RX ADMIN — ASPIRIN 81 MG 81 MG: 81 TABLET ORAL at 09:26

## 2023-01-31 RX ADMIN — METOPROLOL SUCCINATE 50 MG: 50 TABLET, EXTENDED RELEASE ORAL at 09:25

## 2023-01-31 NOTE — ASSESSMENT & PLAN NOTE
Wt Readings from Last 3 Encounters:   01/31/23 71 8 kg (158 lb 6 4 oz)   12/07/22 74 4 kg (164 lb)   09/27/22 74 4 kg (164 lb)     Patient presented with acute onset GARCIA, SOB, and chest pressure  Reports GARCIA and SOB begin after walking around completing errands, SOB improves at rest  Per patient, was told to stop PO Lasix a few months ago and to only take PRN for leg swelling  · Echo 5/2021 - LVEF 45-50%, grade 1 DD  · CTA chest: No pulmonary embolus  Extensive septal thickening and groundglass opacity due to interstitial and alveolar edema with trace effusions  · BNP: 342  S/P 20 mg IV Lasix in ED  · ECHO today: EF 40%, moderately reduced systolic function with global longitudinal strain reduced -10%  Grade 1 diastolic dysfunction  There is abnormal septal motion consistent with LBBB     · Net negative urine output and decrease in weight, unclear of accuracy initially on admission  · Cardiology consulted:  · Recommend furosemide 40 mg p o  daily upon discharge  · Recommend close outpatient follow-up with primary cardiologist  · Continue 1800 mL Fluid restriction   · Monitor Daily weights, I&Os  · Recommend repeat BMP within 5 to 7 days of discharge

## 2023-01-31 NOTE — PLAN OF CARE
Problem: Potential for Falls  Goal: Patient will remain free of falls  Description: INTERVENTIONS:  - Educate patient/family on patient safety including physical limitations  - Instruct patient to call for assistance with activity   - Consult OT/PT to assist with strengthening/mobility   - Keep Call bell within reach  - Keep bed low and locked with side rails adjusted as appropriate  - Keep care items and personal belongings within reach  - Initiate and maintain comfort rounds  - Make Fall Risk Sign visible to staff  - Offer Toileting every 2 Hours, in advance of need  - Apply yellow socks and bracelet for high fall risk patients  - Consider moving patient to room near nurses station  Outcome: Progressing     Problem: PAIN - ADULT  Goal: Verbalizes/displays adequate comfort level or baseline comfort level  Description: Interventions:  - Encourage patient to monitor pain and request assistance  - Assess pain using appropriate pain scale  - Administer analgesics based on type and severity of pain and evaluate response  - Implement non-pharmacological measures as appropriate and evaluate response  - Consider cultural and social influences on pain and pain management  - Notify physician/advanced practitioner if interventions unsuccessful or patient reports new pain  Outcome: Progressing     Problem: INFECTION - ADULT  Goal: Absence or prevention of progression during hospitalization  Description: INTERVENTIONS:  - Assess and monitor for signs and symptoms of infection  - Monitor lab/diagnostic results  - Monitor all insertion sites, i e  indwelling lines, tubes, and drains  - Monitor endotracheal if appropriate and nasal secretions for changes in amount and color  - [place] appropriate cooling/warming therapies per order  - Administer medications as ordered  - Instruct and encourage patient and family to use good hand hygiene technique  - Identify and instruct in appropriate isolation precautions for identified infection/condition  Outcome: Progressing  Goal: Absence of fever/infection during neutropenic period  Description: INTERVENTIONS:  - Monitor WBC    Outcome: Progressing     Problem: SAFETY ADULT  Goal: Patient will remain free of falls  Description: INTERVENTIONS:  - Educate patient/family on patient safety including physical limitations  - Instruct patient to call for assistance with activity   - Consult OT/PT to assist with strengthening/mobility   - Keep Call bell within reach  - Keep bed low and locked with side rails adjusted as appropriate  - Keep care items and personal belongings within reach  - Initiate and maintain comfort rounds  - Make Fall Risk Sign visible to staff  - Offer Toileting every 2 Hours, in advance of need  - Apply yellow socks and bracelet for high fall risk patients  - Consider moving patient to room near nurses station  Outcome: Progressing  Goal: Maintain or return to baseline ADL function  Description: INTERVENTIONS:  -  Assess patient's ability to carry out ADLs; assess patient's baseline for ADL function and identify physical deficits which impact ability to perform ADLs (bathing, care of mouth/teeth, toileting, grooming, dressing, etc )  - Assess/evaluate cause of self-care deficits   - Assess range of motion  - Assess patient's mobility; develop plan if impaired  - Assess patient's need for assistive devices and provide as appropriate  - Encourage maximum independence but intervene and supervise when necessary  - Involve family in performance of ADLs  - Assess for home care needs following discharge   - Consider OT consult to assist with ADL evaluation and planning for discharge  - Provide patient education as appropriate  Outcome: Progressing  Goal: Maintains/Returns to pre admission functional level  Description: INTERVENTIONS:  - Perform BMAT or MOVE assessment daily    - Set and communicate daily mobility goal to care team and patient/family/caregiver     - Collaborate with rehabilitation services on mobility goals if consulted  - Perform Range of Motion 3 times a day  - Reposition patient every 2 hours  -    - Ambulate patient 2 times a day  - Out of bed to chair 1 times a day   - Out of bed for toileting  - Record patient progress and toleration of activity level   Outcome: Progressing     Problem: DISCHARGE PLANNING  Goal: Discharge to home or other facility with appropriate resources  Description: INTERVENTIONS:  - Identify barriers to discharge w/patient and caregiver  - Arrange for needed discharge resources and transportation as appropriate  - Identify discharge learning needs (meds, wound care, etc )  - Arrange for interpretive services to assist at discharge as needed  - Refer to Case Management Department for coordinating discharge planning if the patient needs post-hospital services based on physician/advanced practitioner order or complex needs related to functional status, cognitive ability, or social support system  Outcome: Progressing     Problem: Knowledge Deficit  Goal: Patient/family/caregiver demonstrates understanding of disease process, treatment plan, medications, and discharge instructions  Description: Complete learning assessment and assess knowledge base    Interventions:  - Provide teaching at level of understanding  - Provide teaching via preferred learning methods  Outcome: Progressing     Problem: CARDIOVASCULAR - ADULT  Goal: Maintains optimal cardiac output and hemodynamic stability  Description: INTERVENTIONS:  - Monitor I/O, vital signs and rhythm  - Monitor for S/S and trends of decreased cardiac output  - Administer and titrate ordered vasoactive medications to optimize hemodynamic stability  - Assess quality of pulses, skin color and temperature  - Assess for signs of decreased coronary artery perfusion  - Instruct patient to report change in severity of symptoms  Outcome: Progressing  Goal: Absence of cardiac dysrhythmias or at baseline rhythm  Description: INTERVENTIONS:  - Continuous cardiac monitoring, vital signs, obtain 12 lead EKG if ordered  - Administer antiarrhythmic and heart rate control medications as ordered  - Monitor electrolytes and administer replacement therapy as ordered  Outcome: Progressing     Problem: RESPIRATORY - ADULT  Goal: Achieves optimal ventilation and oxygenation  Description: INTERVENTIONS:  - Assess for changes in respiratory status  - Assess for changes in mentation and behavior  - Position to facilitate oxygenation and minimize respiratory effort  - Oxygen administered by appropriate delivery if ordered  - Initiate smoking cessation education as indicated  - Encourage broncho-pulmonary hygiene including cough, deep breathe, Incentive Spirometry  - Assess the need for suctioning and aspirate as needed  - Assess and instruct to report SOB or any respiratory difficulty  - Respiratory Therapy support as indicated  Outcome: Progressing

## 2023-01-31 NOTE — ASSESSMENT & PLAN NOTE
POA with SpO2 down to 90% on room air, subsequently placed on 5 L NC O2    · Likely in setting of acute CHF exacerbation  · Not previously on home O2   · BC x2: NGTD (72 hrs)  · Currently resting comfortably on room air  · Desat screening: Does not qualify for home O2  · RESOLVED

## 2023-01-31 NOTE — PROGRESS NOTES
Progress Note - Cardiology   Malachi Vila 58 y o  female MRN: [id number]  Unit/Bed#: -01 Encounter: [id number]    Assessment/Plan:  #  Acute on chronic HFmEF: History of nonischemic cardiomyopathy -suspected viral with  Cardiac cath 1/2016 with no obstructive coronary artery disease and echo with LVEF 35-40%  Echo stress from 10/2022 with baseline LVEF 45-50%  Echo this admission with LVEF 40%, global hypokinesis, LBBB, no significant valvular abnormalities  On furosemide 40 mg as needed at home but not taking  Presented now with worsening dyspnea and chest discomfort  Admitted for decompensated CHF  Admission ECG with atrial tachycardia  Cardiac enzymes were normal      #  Atrial tachycardia: On metoprolol succinate 50 mg daily at home  Additional 25 mg daily added in the evening  Prior history of atrial fibrillation during exercise stress echo  #  Chest discomfort: Resolved after diuresis  She is now euvolemic and denies any recurrent chest discomfort  History of cardiac cath in 1/2016 without significant epicardial disease  She had an exercise stress echo in October 2022 which was technically non-diagnostic but showed improved in LVEF with stress  Plan:  -Standing scale weight 158 lb today  -Continue metoprolol succinate 50 mg AM and 25 mg PM  -Received IV furosemide this morning  -Start oral furosemide 40 mg daily starting tomorrow, 2/1  -Consider cardiac monitor during follow up in 1-2 weeks with Dr Dipti Cai at [facility]       Subjective/Objective   Interval history: No acute events overnight  She denies any lightheadedness, chest pain, palpitations, shortness of breath, orthopnea  She feels like she is back to her baseline      Objective:  Vitals: /76 (BP Location: Left arm)   Pulse 69   Temp 98 °F (36 7 °C) (Tympanic)   Resp 16   Ht 5' 4" (1 626 m)   Wt 71 8 kg (158 lb 6 4 oz)   SpO2 97%   BMI 27 19 kg/m²   Vitals:    01/30/23 0828 01/31/23 0557   Weight: 71 7 kg (158 lb) 71 8 kg (158 lb 6 4 oz)     Orthostatic Blood Pressures    Flowsheet Row Most Recent Value   Blood Pressure 117/76 filed at 01/31/2023 0430   Patient Position - Orthostatic VS Lying filed at 01/31/2023 0430            Intake/Output Summary (Last 24 hours) at 1/31/2023 0749  Last data filed at 1/30/2023 2201  Gross per 24 hour   Intake --   Output 1500 ml   Net -1500 ml       Invasive Devices     Peripheral Intravenous Line  Duration           Peripheral IV 01/27/23 Left;Ventral (anterior) Forearm 3 days              Review of Systems: All systems reviewed and negative except for that noted above    Physical Exam: General appearance: alert and oriented, in no acute distress  Neck: no carotid bruit, no JVD and supple, symmetrical, trachea midline  Lungs: clear to auscultation bilaterally  Heart: regular rate and rhythm, S1, S2 normal, no murmur, click, rub or gallop  Abdomen: soft, non-tender; bowel sounds normal; no masses,  no organomegaly  Extremities: extremities normal, warm and well-perfused; no cyanosis, clubbing, or edema  Skin: Skin color, texture, turgor normal  No rashes or lesions    Lab Results: I have personally reviewed pertinent lab results  Imaging: I have personally reviewed pertinent reports  EKG: Personally reviewed    Counseling / Coordination of Care  Total time spent today 20 minutes  Greater than 50% of total time was spent with the patient and / or family counseling and / or coordination of care

## 2023-01-31 NOTE — PLAN OF CARE
Problem: INFECTION - ADULT  Goal: Absence or prevention of progression during hospitalization  Description: INTERVENTIONS:  - Assess and monitor for signs and symptoms of infection  - Monitor lab/diagnostic results  - Monitor all insertion sites, i e  indwelling lines, tubes, and drains  - Monitor endotracheal if appropriate and nasal secretions for changes in amount and color  - [place] appropriate cooling/warming therapies per order  - Administer medications as ordered  - Instruct and encourage patient and family to use good hand hygiene technique  - Identify and instruct in appropriate isolation precautions for identified infection/condition  Outcome: Progressing  Goal: Absence of fever/infection during neutropenic period  Description: INTERVENTIONS:  - Monitor WBC    Outcome: Progressing

## 2023-01-31 NOTE — ASSESSMENT & PLAN NOTE
· BP reviewed, and remain stable  · Continue Metoprolol BID and Furosemide  · Continue to hold amlodipine upon discharge  · Low salt diet

## 2023-01-31 NOTE — ASSESSMENT & PLAN NOTE
Patient reported mid sternal chest pressure that began with sudden onset GARCIA and SOB  S/p 325 mg ASA and Nitro paste in ED with resolution of pain    · Hx of Prinzmetal Angina - Recent stress test 10/2021 with no ischemia noted  · Noted to be tachycardic in the 140s, Improved after 15 mg IV Cardizem  · HS trop: 9->14->21  · Cardiology following:  · Likely due to demand from [facility] CHF, less likely suspect ACS  · Continue Toprol 50 mg q am and 25 mg qHS  · Consider adding Imdur if BP allows

## 2023-01-31 NOTE — ASSESSMENT & PLAN NOTE
Patient reports posterior pain in left leg, worse with movement  Pain begins behind knee/upper calf and runs to mid thigh  · D-dimer: 0 87  Elevated H/H at 17 7/52 1 POA  · Noted decrease in Hgb from 17 7->13 9  Possibly some fluid shifting vs lab error, but no acute bleed suspected    · LLE duplex: No acute or chronic DVT

## 2023-01-31 NOTE — DISCHARGE SUMMARY
[address]  Discharge- Erick Deiters 1960, 58 y o  female MRN: [id number]  Unit/Bed#: -01 Encounter: [id number]  Primary Care Provider: Obey Soto MD   Date and time admitted to hospital: 1/27/2023  6:02 PM    * Acute on chronic combined systolic and diastolic congestive heart failure [facility])  Assessment & Plan  Wt Readings from Last 3 Encounters:   01/31/23 71 8 kg (158 lb 6 4 oz)   12/07/22 74 4 kg (164 lb)   09/27/22 74 4 kg (164 lb)     Patient presented with acute onset GARCIA, SOB, and chest pressure  Reports GARCIA and SOB begin after walking around completing errands, SOB improves at rest  Per patient, was told to stop PO Lasix a few months ago and to only take PRN for leg swelling  · Echo 5/2021 - LVEF 45-50%, grade 1 DD  · CTA chest: No pulmonary embolus  Extensive septal thickening and groundglass opacity due to interstitial and alveolar edema with trace effusions  · BNP: 342  S/P 20 mg IV Lasix in ED  · ECHO today: EF 40%, moderately reduced systolic function with global longitudinal strain reduced -10%  Grade 1 diastolic dysfunction  There is abnormal septal motion consistent with LBBB     · Net negative urine output and decrease in weight, unclear of accuracy initially on admission  · Cardiology consulted:  · Recommend furosemide 40 mg p o  daily upon discharge  · Recommend close outpatient follow-up with primary cardiologist  · Continue 1800 mL Fluid restriction   · Monitor Daily weights, I&Os  · Recommend repeat BMP within 5 to 7 days of discharge    Acute respiratory failure with hypoxia ([address] )  Assessment & Plan  POA with SpO2 down to 90% on room air, subsequently placed on 5 L NC O2    · Likely in setting of acute CHF exacerbation  · Not previously on home O2   · BC x2: NGTD (72 hrs)  · Currently resting comfortably on room air  · Desat screening: Does not qualify for home O2  · RESOLVED    Chest pain  Assessment & Plan  Patient reported mid sternal chest pressure that began with sudden onset GARCIA and SOB  S/p 325 mg ASA and Nitro paste in ED with resolution of pain  · Hx of Prinzmetal Angina - Recent stress test 10/2021 with no ischemia noted  · Noted to be tachycardic in the 140s, Improved after 15 mg IV Cardizem  · HS trop: 9->14->21  · Cardiology following:  · Likely due to demand from [facility] CHF, less likely suspect ACS  · Continue Toprol 50 mg q am and 25 mg qHS  · Consider adding Imdur if BP allows    Nonsustained paroxysmal ventricular tachycardia  Assessment & Plan  · Hx of NPVT, follows outpatient with cardiology  · Stress test 10/2022 with no ischemia noted  · 1 run of Vtach noted in ED, self resolved  · ST 140s in ED,  improved s/p 15 mg IV Cardizem   · Continue Metoprolol BID  · Telemetry reviewed-> NSR with run of NSVT  · Cardiology Following  · Consider cardiac monitor during follow up in 1-2 weeks with Dr Dipti Cai at [facility]  · Recommend continued outpatient follow up with Cardiology    Deep vein thrombosis (DVT) of left lower extremity [facility])  Assessment & Plan  Patient reports posterior pain in left leg, worse with movement  Pain begins behind knee/upper calf and runs to mid thigh  · D-dimer: 0 87  Elevated H/H at 17 7/52 1 POA  · Noted decrease in Hgb from 17 7->13 9  Possibly some fluid shifting vs lab error, but no acute bleed suspected    · LLE duplex: No acute or chronic DVT    Type 2 diabetes mellitus treated with insulin [facility])  Assessment & Plan  Lab Results   Component Value Date    HGBA1C 7 1 (H) 12/05/2022       Recent Labs     01/30/23  1645 01/30/23  2237 01/31/23  0702 01/31/23  1120   POCGLU 144* 196* 119 125       Blood Sugar Average: Last 72 hrs:  (P) 145 0625     · Hold home Xigduo while in hospital  · Can resume upon discharge  · Continue home Lantus 15 units HS and Humalog 6 units TID with meals  · Diabetic Diet    Essential hypertension  Assessment & Plan  · BP reviewed, and remain stable  · Continue Metoprolol BID and Furosemide  · Continue to hold amlodipine upon discharge  · Low salt diet      Medical Problems     Resolved Problems  Date Reviewed: 1/31/2023   None       Discharging Physician / Practitioner: Shannon Walker  PCP: Carolina Sanchez MD  Admission Date:   Admission Orders (From admission, onward)     Ordered        01/27/23 2053  INPATIENT ADMISSION  Once                      Discharge Date: 01/31/23    Consultations During Hospital Stay:  · Cardiology    Procedures Performed:   · None    Significant Findings / Test Results:     VAS lower limb venous duplex study, unilateral/limited   Final Result by DO Marcela (01/30 1451)      CTA ED chest PE Study   Final Result by Checo Nicholas MD (01/27 2024)      No pulmonary embolus  Extensive septal thickening and groundglass opacity due to interstitial and alveolar edema with trace effusions  Workstation performed: [id number]         XR chest portable   ED Interpretation by Osmar Browne MD (01/27 2029)   CXR; bilateral pulmonary congestion c/w mild pulmonary edema  Final Result by Mile Frye MD (77/96 3403)      Borderline cardiomegaly  Slight vascular congestion                  Workstation performed: [id number]             Incidental Findings:   · CTA Chest: Extensive septal thickening and groundglass opacity due to interstitial and alveolar edema with trace effusions  · I reviewed the above mentioned incidental findings with the patient and/or family and they expressed understanding  Test Results Pending at Discharge (will require follow up):    · Final Blood Culture results     Outpatient Tests Requested:  · Recommend outpatient follow-up with PCP  · Recommend outpatient follow-up with cardiology  · Recommend repeat BMP within 5 to 7 days of discharge    Complications: None    Reason for Admission: Acute CHF exacerbation    Hospital Course:   Libby Colin is a 58 y o  female patient  PMH of cardiomyopathy with combined heart failure, T2DM, hypertension, NPVT who originally presented to the hospital on 1/27/2023 due to  shortness of breath  Per patient, she was walking around after work completing errands when she became short of breath  She noticed her breathing was worse when she moved around and improved slightly when she sat still  Patient also reports midsternal chest pressure that began soon after the GARCIA  Patient presented immediately to the ED after noticing the chest pressure  Patient reports her breathing feels similar to previous heart failure exacerbations  In the ED, her laboratory evaluation was remarkable for an elevated BNP of 342, elevated hemoglobin of 17 7, elevated hematocrit at 52 1, elevated D-dimer at 0 87  She was also noted to be satting 90% on room air and was placed on nasal cannula  She was noted to be tachycardic in the 140s and hypertensive with an EKG that showed a tachycardic arrhythmia that improved after receiving 15 mg of IV Cardizem  Chest x-ray was completed that showed possible pulmonary congestion she was given 20 mg of IV Lasix  A CT PE study was completed that showed no evidence of a PE and showed bilateral pulmonary edema  Cardiology was consulted  Patient completed 4 days of IV diuresis with gradual improvement in respiratory status  Patient did not qualify for home oxygen upon discharge  Plan to transition to oral furosemide 40 Mg daily upon discharge, will add oral potassium supplementation with diuresis  Recommend repeat BMP within 5 to 7 days of discharge  Recommend close outpatient follow-up with primary cardiologist     Patient with episode of nonsustained paroxysmal ventricular tachycardia  Cardiology was following  Recommend increasing home metoprolol to 50 mg in the morning and 25 mg in the evening  Monitor reviewed, normal sinus rhythm with 1 run of nonsustained ventricular tachycardia    Recommend outpatient follow-up with primary cardiologist   Consider cardiac monitoring during follow-up  With adjustment in cardiac medication, Recommend discontinuing amlodipine upon discharge  While admitted patient with complaints of left leg pain worse with movement  Dimer mildly elevated  Lower extremity duplex negative for any acute or chronic DVTs  Continue supportive care upon discharge  Recommend outpatient follow-up with PCP for further management  Discussed plan with patient at bedside, all questions were answered  Please see above list of diagnoses and related plan for additional information  Condition at Discharge: stable    Discharge Day Visit / Exam:   Subjective: Patient states he feels much better today  Patient states she has no complaints and is ready to go home  Patient denies any active chest pain, shortness of breath, abdominal pain, nausea, vomiting or diarrhea  Vitals: Blood Pressure: 115/74 (01/31/23 1245)  Pulse: 72 (01/31/23 1245)  Temperature: 97 6 °F (36 4 °C) (01/31/23 1245)  Temp Source: Oral (01/31/23 1245)  Respirations: 14 (01/31/23 1245)  Height: 5' 4" (162 6 cm) (01/30/23 9793)  Weight - Scale: 71 8 kg (158 lb 6 4 oz) (01/31/23 0557)  SpO2: 94 % (01/31/23 1245)  Exam:   Physical Exam  Vitals and nursing note reviewed  Constitutional:       General: She is not in acute distress  HENT:      Head: Normocephalic  Nose: Nose normal  No congestion  Mouth/Throat:      Mouth: Mucous membranes are moist       Pharynx: Oropharynx is clear  Cardiovascular:      Rate and Rhythm: Normal rate and regular rhythm  Pulses: Normal pulses  Heart sounds: Normal heart sounds  No murmur heard  Pulmonary:      Effort: Pulmonary effort is normal  No respiratory distress  Breath sounds: Normal breath sounds  Abdominal:      General: Bowel sounds are normal  There is no distension  Palpations: Abdomen is soft  Tenderness: There is no abdominal tenderness     Musculoskeletal: General: Normal range of motion  Cervical back: Normal range of motion  Right lower leg: No edema  Left lower leg: No edema  Skin:     General: Skin is warm and dry  Capillary Refill: Capillary refill takes less than 2 seconds  Neurological:      Mental Status: She is alert and oriented to person, place, and time  Mental status is at baseline  Motor: No weakness  Psychiatric:         Mood and Affect: Mood normal          Speech: Speech normal          Behavior: Behavior is cooperative  Discussion with Family: Patient declined call to   Discharge instructions/Information to patient and family:   See after visit summary for information provided to patient and family  Provisions for Follow-Up Care:  See after visit summary for information related to follow-up care and any pertinent home health orders  Disposition:   Home    Planned Readmission: No     Discharge Statement:  I spent 60 minutes discharging the patient  This time was spent on the day of discharge  I had direct contact with the patient on the day of discharge  Greater than 50% of the total time was spent examining patient, answering all patient questions, arranging and discussing plan of care with patient as well as directly providing post-discharge instructions  Additional time then spent on discharge activities  Discharge Medications:  See after visit summary for reconciled discharge medications provided to patient and/or family        **Please Note: This note may have been constructed using a voice recognition system**

## 2023-01-31 NOTE — ASSESSMENT & PLAN NOTE
Lab Results   Component Value Date    HGBA1C 7 1 (H) 12/05/2022       Recent Labs     01/30/23  1645 01/30/23  2237 01/31/23  0702 01/31/23  1120   POCGLU 144* 196* 119 125       Blood Sugar Average: Last 72 hrs:  (P) 145 0625     · Hold home Xigduo while in hospital  · Can resume upon discharge  · Continue home Lantus 15 units HS and Humalog 6 units TID with meals  · Diabetic Diet

## 2023-01-31 NOTE — ASSESSMENT & PLAN NOTE
· Hx of NPVT, follows outpatient with cardiology  · Stress test 10/2022 with no ischemia noted  · 1 run of Vtach noted in ED, self resolved  · ST 140s in ED,  improved s/p 15 mg IV Cardizem   · Continue Metoprolol BID  · Telemetry reviewed-> NSR with run of NSVT  · Cardiology Following  · Consider cardiac monitor during follow up in 1-2 weeks with Dr Jame Herrmann at [facility]  · Recommend continued outpatient follow up with Cardiology

## 2023-01-31 NOTE — RESPIRATORY THERAPY NOTE
Home Oxygen Qualifying Test     Patient name: Joaquim Parra        : 1960   Date of Test:  2023  Diagnosis:    Home Oxygen Test:    **Medicare Guidelines require item(s) 1-5 on all ambulatory patients or 1 and 2 on non-ambulatory patients  1  Baseline SPO2 on Room Air at rest 96%   a  If <= 88% on Room Air add O2 via NC to obtain SpO2 >=88%  If LPM needed, document LPM  needed to reach =>88%    2  SPO2 during exertion on Room Air 93%  a  During exertion monitor SPO2  If SPO2 increases >=89%, do not add supplemental oxygen    3  SPO2 on Oxygen at Rest N/A% at N/A LPM    4  SPO2 during exertion on Oxygen N/A% at N/A LPM    5  Test performed during exertion activity  Walking     []  Supplemental Home Oxygen is indicated  [x]  Client does not qualify for home oxygen  Respiratory Additional Notes- Found pt on RA at rest with a SPO2 of 96%  Pt walked in the hallway on RA  Pt maintained 93% and above while walking  Walked pt back to the bed  Pt does not qualify for home oxygen       Blake Perez RT

## 2023-02-01 ENCOUNTER — TRANSITIONAL CARE MANAGEMENT (OUTPATIENT)
Dept: INTERNAL MEDICINE CLINIC | Facility: CLINIC | Age: 63
End: 2023-02-01

## 2023-02-01 DIAGNOSIS — I10 ESSENTIAL (PRIMARY) HYPERTENSION: ICD-10-CM

## 2023-02-01 LAB
BACTERIA BLD CULT: NORMAL
BACTERIA BLD CULT: NORMAL

## 2023-02-01 RX ORDER — LOSARTAN POTASSIUM 100 MG/1
TABLET ORAL
Qty: 30 TABLET | Refills: 5 | Status: SHIPPED | OUTPATIENT
Start: 2023-02-01

## 2023-02-01 NOTE — UTILIZATION REVIEW
NOTIFICATION OF ADMISSION DISCHARGE   This is a Notification of Discharge from [address]  Please be advised that this patient has been discharge from our facility  Below you will find the admission and discharge date and time including the patient’s disposition  UTILIZATION REVIEW CONTACT:  [address] [place]  Utilization   Network Utilization Review Department  Phone: [phone] x carefully listen to the prompts  All voicemails are confidential   Email: [email or web address]     ADMISSION INFORMATION  PRESENTATION DATE: 1/27/2023  6:02 PM  OBERVATION ADMISSION DATE:   INPATIENT ADMISSION DATE: 1/27/23  8:53 PM   DISCHARGE DATE: 1/31/2023  3:37 PM   DISPOSITION:Home/Self Care    IMPORTANT INFORMATION:  Send all requests for admission clinical reviews, approved or denied determinations and any other requests to dedicated fax number below belonging to the campus where the patient is receiving treatment   List of dedicated fax numbers:  [address] DENIALS (Administrative/Medical Necessity) [phone]   [address] (Maternity/NICU/Pediatrics) [phone]   [facility] [phone]   [place] [address] [phone]   [address] [phone]   220 [facility] [phone]   90 [facility] [phone]   [address] [place] [address] 119 [phone]   [facility]  [phone]   [facility] [facility] [phone]   412 [facility] [address] [facility] [phone]

## 2023-02-02 ENCOUNTER — RA CDI HCC (OUTPATIENT)
Dept: OTHER | Facility: HOSPITAL | Age: 63
End: 2023-02-02

## 2023-02-02 NOTE — PROGRESS NOTES
[facility]  coding opportunities          Chart Reviewed number of suggestions sent to Provider: 1   I11 0      Patients Insurance        Commercial Insurance: [employer]

## 2023-02-03 ENCOUNTER — TRANSITIONAL CARE MANAGEMENT (OUTPATIENT)
Dept: INTERNAL MEDICINE CLINIC | Facility: CLINIC | Age: 63
End: 2023-02-03

## 2023-02-03 DIAGNOSIS — E11.649 UNCONTROLLED TYPE 2 DIABETES MELLITUS WITH HYPOGLYCEMIA WITHOUT COMA (HCC): ICD-10-CM

## 2023-02-06 ENCOUNTER — TRANSITIONAL CARE MANAGEMENT (OUTPATIENT)
Dept: INTERNAL MEDICINE CLINIC | Facility: CLINIC | Age: 63
End: 2023-02-06

## 2023-02-06 RX ORDER — INSULIN LISPRO 100 [IU]/ML
INJECTION, SOLUTION INTRAVENOUS; SUBCUTANEOUS
Qty: 15 ML | Refills: 3 | Status: SHIPPED | OUTPATIENT
Start: 2023-02-06

## 2023-02-08 ENCOUNTER — OFFICE VISIT (OUTPATIENT)
Dept: INTERNAL MEDICINE CLINIC | Facility: CLINIC | Age: 63
End: 2023-02-08

## 2023-02-08 VITALS
HEART RATE: 64 BPM | BODY MASS INDEX: 27.66 KG/M2 | SYSTOLIC BLOOD PRESSURE: 122 MMHG | OXYGEN SATURATION: 96 % | RESPIRATION RATE: 16 BRPM | HEIGHT: 64 IN | TEMPERATURE: 98.1 F | DIASTOLIC BLOOD PRESSURE: 80 MMHG | WEIGHT: 162 LBS

## 2023-02-08 DIAGNOSIS — D64.9 ANEMIA, UNSPECIFIED TYPE: ICD-10-CM

## 2023-02-08 DIAGNOSIS — F41.9 ANXIETY: ICD-10-CM

## 2023-02-08 DIAGNOSIS — I47.29 NONSUSTAINED PAROXYSMAL VENTRICULAR TACHYCARDIA: ICD-10-CM

## 2023-02-08 DIAGNOSIS — I20.8 STABLE ANGINA PECTORIS (HCC): ICD-10-CM

## 2023-02-08 DIAGNOSIS — J96.01 ACUTE RESPIRATORY FAILURE WITH HYPOXIA (HCC): Primary | ICD-10-CM

## 2023-02-08 DIAGNOSIS — Z79.4 TYPE 2 DIABETES MELLITUS TREATED WITH INSULIN (HCC): ICD-10-CM

## 2023-02-08 DIAGNOSIS — I50.43 ACUTE ON CHRONIC COMBINED SYSTOLIC AND DIASTOLIC CONGESTIVE HEART FAILURE (HCC): ICD-10-CM

## 2023-02-08 DIAGNOSIS — E11.9 TYPE 2 DIABETES MELLITUS TREATED WITH INSULIN (HCC): ICD-10-CM

## 2023-02-08 DIAGNOSIS — B33.24 VIRAL CARDIOMYOPATHY (HCC): ICD-10-CM

## 2023-02-08 DIAGNOSIS — I82.4Y2 ACUTE DEEP VEIN THROMBOSIS (DVT) OF PROXIMAL VEIN OF LEFT LOWER EXTREMITY (HCC): ICD-10-CM

## 2023-02-08 PROBLEM — I20.0 UNSTABLE ANGINA PECTORIS (HCC): Status: RESOLVED | Noted: 2023-02-08 | Resolved: 2023-02-08

## 2023-02-08 PROBLEM — I20.89 STABLE ANGINA PECTORIS: Status: ACTIVE | Noted: 2023-02-08

## 2023-02-08 PROBLEM — I20.0 UNSTABLE ANGINA PECTORIS (HCC): Status: ACTIVE | Noted: 2023-02-08

## 2023-02-08 RX ORDER — ALPRAZOLAM 0.5 MG/1
0.5 TABLET ORAL DAILY PRN
Qty: 30 TABLET | Refills: 1 | Status: SHIPPED | OUTPATIENT
Start: 2023-02-08

## 2023-02-08 NOTE — ASSESSMENT & PLAN NOTE
Wt Readings from Last 3 Encounters:   02/08/23 73 5 kg (162 lb)   01/31/23 71 8 kg (158 lb 6 4 oz)   12/07/22 74 4 kg (164 lb)   Patient was hospitalized with acute respiratory distress all the x-ray labs EKG cardiology consult reviewed from the hospital acute on chronic combined systolic diastolic CHF echocardiogram findings reviewed with the patient now on Lasix increased to 40 mg daily with low-salt diet fluid restriction Daily weight monitoring seems euvolemic and CHF seems to be compensated intermittent dizziness when she stands up possibly due to orthostatic hypotension reviewed the record as follows  Patient presented with acute onset GARCIA, SOB, and chest pressure  Reports GARCIA and SOB begin after walking around completing errands, SOB improves at rest  Per patient, was told to stop PO Lasix a few months ago and to only take PRN for leg swelling  • Echo 5/2021 - LVEF 45-50%, grade 1 DD  • CTA chest: No pulmonary embolus  Extensive septal thickening and groundglass opacity due to interstitial and alveolar edema with trace effusions  • BNP: 342  S/P 20 mg IV Lasix in ED  • ECHO today: EF 40%, moderately reduced systolic function with global longitudinal strain reduced -10%  Grade 1 diastolic dysfunction  There is abnormal septal motion consistent with LBBB  • Net negative urine output and decrease in weight, unclear of accuracy initially on admission  • Cardiology consulted:  ? Recommend furosemide 40 mg p o  daily upon discharge  ?  Recommend close outpatient follow-up with primary cardiologist  • Continue 1800 mL Fluid restriction   • Monitor Daily weights, I&Os

## 2023-02-08 NOTE — ASSESSMENT & PLAN NOTE
• Denies any palpitation patient has for now heart monitoring for 2 weeks for any recurrent ventricular tachycardia and after that patient is going to be having arrangement for vital sign monitoring at home  • Continue current treatment patient seen by cardiology about a week ago reviewed discussed with the patient at length  • Hx of NPVT, follows outpatient with cardiology  • Stress test 10/2022 with no ischemia noted  • 1 run of Vtach noted in ED, self resolved  • ST 140s in ED,  improved s/p 15 mg IV Cardizem   • Continue Metoprolol BID  • Telemetry reviewed-> NSR with run of NSVT  • Cardiology Following  ? Consider cardiac monitor during follow up in 1-2 weeks with Dr Franko Chandler at [facility]  ?  Recommend continued outpatient follow up with Cardiology

## 2023-02-08 NOTE — LETTER
February 8, 2023     Patient: Evelyn Gonzalez  YOB: 1960  Date of Visit: 2/8/2023      To Whom it May Concern:    Ashtyn Nunez is under my professional care  Jana Goode was seen in my office on 2/8/2023  Jana Goode advised no work from January 30, 2023 until February 5, 2023 due to the hospitalization and under my care and advised return to work on January 6, 2023    If you have any questions or concerns, please don't hesitate to call           Sincerely,          Bradley Melo MD        CC: No Recipients No

## 2023-02-08 NOTE — ASSESSMENT & PLAN NOTE
Patient feeling better no oxygen at present time pulse ox 96% on room air overall improved and all the hospital records reviewed as follows CHF compensated now euvolemic      POA with SpO2 down to 90% on room air, subsequently placed on 5 L NC O2    • Likely in setting of acute CHF exacerbation  • Not previously on home O2   • BC x2: NGTD (72 hrs)  • Currently resting comfortably on room air  • Desat screening: Does not qualify for home O2  • RESOLVED

## 2023-02-08 NOTE — PATIENT INSTRUCTIONS
Heart Failure   AMBULATORY CARE:   Heart failure  is a condition that does not allow your heart to fill or pump properly  Not enough oxygen in your blood gets to your organs and tissues  Fluid may not move through your body properly  Fluid builds up and causes swelling and trouble breathing  This is known as congestive heart failure  Heart failure may start in the left or right ventricle  Heart failure is often caused by damage or injury to your heart  The damage may be caused by other heart problems, diabetes, or high blood pressure  The damage may have also been caused by an infection  Heart failure is a long-term condition that tends to get worse over time  It is important to manage your health to improve your quality of life  Common signs and symptoms:   Trouble breathing with activity that worsens to trouble breathing at rest    Shortness of breath while lying flat    Severe shortness of breath and coughing at night that usually wakes you    Feeling lightheaded when you stand up    Purple color around your mouth and nails    Confusion or anxiety    Chest pain at night    Periods of no breathing, then breathing fast    Lack of energy (often worsened by physical activity), or trouble sleeping    Swelling in your ankles, legs, or abdomen    Heartbeat that is fast or not regular    Fingers and toes feel cool to the touch    Call your local emergency number (911 in the [address]) if:   You have any of the following signs of a heart attack:      Squeezing, pressure, or pain in your chest    You may  also have any of the following:     Discomfort or pain in your back, neck, jaw, stomach, or arm    Shortness of breath    Nausea or vomiting    Lightheadedness or a sudden cold sweat      Call your doctor if:   Your heartbeat is fast, slow, or uneven all the time      You have symptoms of worsening heart failure:      Shortness of breath at rest, at night, or that is getting worse in any way    Weight gain of 3 or more pounds (1 4 kg) in a day, or more than your healthcare provider says is okay    More swelling in your legs or ankles    Abdominal pain or swelling    More coughing    Loss of appetite    Feeling tired all the time    You feel hopeless or depressed, or you have lost interest in things you used to enjoy  You often feel worried or afraid  You have questions or concerns about your condition or care  Treatment for heart failure  may include any of the following:  Medicines  may be needed to help regulate your heart rhythm  You may also need medicines to lower your blood pressure, and to decrease extra fluids  Oxygen  may help you breathe easier if your oxygen level is lower than normal  A CPAP machine may be used to keep your airway open while you sleep  Cardiac rehab  is a program run by specialists who will help you safely strengthen your heart  In the program you will learn about exercise, relaxation, stress management, and heart-healthy nutrition  Cardiac rehab may be recommended if your heart failure is not severe  Surgery  can be done to implant a pacemaker or another device in your chest to regulate your heart rhythm  Other types of surgery can open blocked heart vessels, replace a damaged heart valve, or remove scar tissue  Manage swelling from extra fluid:   Elevate (raise) your legs above the level of your heart  This will help with fluid that builds up in your legs or ankles  Elevate your legs as often as possible during the day  Prop your legs on pillows or blankets to keep them elevated comfortably  Try not to stand for long periods of time during the day  Move around to keep your blood circulating  Limit sodium (salt)  Ask how much sodium you can have each day  Your healthcare provider may give you a limit, such as 2,300 milligrams (mg) a day  Your provider or a dietitian can teach you how to read food labels for the number of mg in a food   He or she can also help you find ways to have less salt  For example, if you add salt to food as you cook, do not add more at the table  Drink liquids as directed  You may need to limit the amount of liquid you drink within 24 hours  Your healthcare provider will tell you how much liquid to have and which liquids are best for you  He or she may tell you to limit liquid to 1 5 to 2 liters in a day  He or she will also tell you how often to drink liquid throughout the day  Weigh yourself every morning  Use the same scale, in the same spot  Do this after you use the bathroom, but before you eat or drink  Wear the same type of clothing each time  Write down your weight and call your healthcare provider if you have a sudden weight gain  Swelling and weight gain are signs of fluid buildup  Manage heart failure: Your quality of life may improve with treatment and the following:  Do not smoke  Nicotine and other chemicals in cigarettes and cigars can cause lung and heart damage  Ask your healthcare provider for information if you currently smoke and need help to quit  E-cigarettes or smokeless tobacco still contain nicotine  Talk to your healthcare provider before you use these products  Do not drink alcohol or use illegal drugs  Alcohol and drugs can increase your risk for high blood pressure, diabetes, and coronary artery disease  Eat heart-healthy foods  Heart-healthy foods include fruits, vegetables, lean meat (such as beef, chicken, or pork), and low-fat dairy products  Fatty fish such as salmon and tuna are also heart healthy  Other heart-healthy foods include walnuts, whole-grain breads, beans, and cooked beans  Replace butter and margarine with heart-healthy oils such as olive oil or canola oil  Your provider or a dietitian can help you create heart-healthy meal plans  Manage any chronic health conditions you have    These include high blood pressure, diabetes, obesity, high cholesterol, metabolic syndrome, and COPD  You will have fewer symptoms if you manage these health conditions  Follow your healthcare provider's recommendations and follow up with him or her regularly  Maintain a healthy weight  Being overweight can increase your risk for high blood pressure, diabetes, and coronary artery disease  These conditions can make your symptoms worse  Ask your healthcare provider how much you should weigh  Ask him or her to help you create a weight loss plan if you are overweight  Stay active  Activity can help keep your symptoms from getting worse  Walking is a type of physical activity that helps maintain your strength and improve your mood  Physical activity also helps you manage your weight  Work with your healthcare provider to create an exercise plan that is right for you  Get vaccines as directed  The flu and pneumonia can be severe for a person who has heart failure  Vaccines protect you from these infections  Get a flu shot every year as soon as it is recommended, usually in September or October  You may also need the pneumonia vaccine  Your healthcare provider can tell you if you need other vaccines, and when to get them  Follow up with your doctor or cardiologist within 7 days or as directed: You may need to return for other tests  You may need home health care  A healthcare provider will monitor your vital signs, weight, and make sure your medicines are working  Write down your questions so you remember to ask them during your visits  Join a support group:  Heart failure can be difficult to manage  It may be helpful to talk with others who have heart failure  You may learn how to better manage your condition or get emotional support  For more information:  [address] [place] [address] [place]   Phone: [phone]  Web Address: https://www strong [employer]/  [address] Information is for End User's use only and may not be sold, redistributed or otherwise used for commercial purposes  All illustrations and images included in CareNotes® are the copyrighted property of [employer]  or [employer]  The above information is an  only  It is not intended as medical advice for individual conditions or treatments  Talk to your doctor, nurse or pharmacist before following any medical regimen to see if it is safe and effective for you

## 2023-02-08 NOTE — ASSESSMENT & PLAN NOTE
Cardiomyopathy possibly due to viral etiology and last echocardiogram ejection fraction about 40 to 56% systolic diastolic dysfunction followed by cardiology no chest pain for now

## 2023-02-08 NOTE — ASSESSMENT & PLAN NOTE
Patient had a chest pain more than a month ago and relieved with nitroglycerin and again got chest pain with respiratory distress just before hospitalization at present asymptomatic no chest pain no difficulty breathing stable following is the records reviewed from the hospital discussed with the patient at length    Patient reported mid sternal chest pressure that began with sudden onset GARCIA and SOB  S/p 325 mg ASA and Nitro paste in ED with resolution of pain  • Hx of Prinzmetal Angina - Recent stress test 10/2021 with no ischemia noted  • Noted to be tachycardic in the 140s, Improved after 15 mg IV Cardizem  • HS trop: 9->14->21  • Cardiology following:  ? Likely due to demand from [facility] CHF, less likely suspect ACS  ? Continue Toprol 50 mg q am and 25 mg qHS  ?  Consider adding Imdur if BP allows

## 2023-02-08 NOTE — PROGRESS NOTES
Dr Alea Alvarado Office Visit Note  23     Fidel Strong 58 y o  female MRN: [id number]  : 1960    Assessment:     1  Acute respiratory failure with hypoxia (HCC)  Assessment & Plan:  Patient feeling better no oxygen at present time pulse ox 96% on room air overall improved and all the hospital records reviewed as follows CHF compensated now euvolemic      POA with SpO2 down to 90% on room air, subsequently placed on 5 L NC O2    • Likely in setting of acute CHF exacerbation  • Not previously on home O2   • BC x2: NGTD (72 hrs)  • Currently resting comfortably on room air  • Desat screening: Does not qualify for home O2  • RESOLVED      2  Nonsustained paroxysmal ventricular tachycardia  Assessment & Plan:  • Denies any palpitation patient has for now heart monitoring for 2 weeks for any recurrent ventricular tachycardia and after that patient is going to be having arrangement for vital sign monitoring at home  • Continue current treatment patient seen by cardiology about a week ago reviewed discussed with the patient at length  • Hx of NPVT, follows outpatient with cardiology  • Stress test 10/2022 with no ischemia noted  • 1 run of Vtach noted in ED, self resolved  • ST 140s in ED,  improved s/p 15 mg IV Cardizem   • Continue Metoprolol BID  • Telemetry reviewed-> NSR with run of NSVT  • Cardiology Following  ? Consider cardiac monitor during follow up in 1-2 weeks with Dr Dolores Booker at [facility]  ? Recommend continued outpatient follow up with Cardiology      3   Acute on chronic combined systolic and diastolic congestive heart failure (HCC)  Assessment & Plan:  Wt Readings from Last 3 Encounters:   23 73 5 kg (162 lb)   23 71 8 kg (158 lb 6 4 oz)   22 74 4 kg (164 lb)   Patient was hospitalized with acute respiratory distress all the x-ray labs EKG cardiology consult reviewed from the hospital acute on chronic combined systolic diastolic CHF echocardiogram findings reviewed with the patient now on Lasix increased to 40 mg daily with low-salt diet fluid restriction Daily weight monitoring seems euvolemic and CHF seems to be compensated intermittent dizziness when she stands up possibly due to orthostatic hypotension reviewed the record as follows  Patient presented with acute onset GARCIA, SOB, and chest pressure  Reports GARCIA and SOB begin after walking around completing errands, SOB improves at rest  Per patient, was told to stop PO Lasix a few months ago and to only take PRN for leg swelling  • Echo 5/2021 - LVEF 45-50%, grade 1 DD  • CTA chest: No pulmonary embolus  Extensive septal thickening and groundglass opacity due to interstitial and alveolar edema with trace effusions  • BNP: 342  S/P 20 mg IV Lasix in ED  • ECHO today: EF 40%, moderately reduced systolic function with global longitudinal strain reduced -10%  Grade 1 diastolic dysfunction  There is abnormal septal motion consistent with LBBB  • Net negative urine output and decrease in weight, unclear of accuracy initially on admission  • Cardiology consulted:  ? Recommend furosemide 40 mg p o  daily upon discharge  ? Recommend close outpatient follow-up with primary cardiologist  • Continue 1800 mL Fluid restriction   • Monitor Daily weights, I&Os          4  Type 2 diabetes mellitus treated with insulin [facility])  Assessment & Plan:    Lab Results   Component Value Date    HGBA1C 7 1 (H) 12/05/2022   Patient blood sugar seems to be reasonably controlled the basis of A1c after discharge continue Xigduo and Lantus 15 in the evening along with NovoLog 6 units with each meal diabetic diet hypoglycemia protocol in place foot exam normal dilated eye exam once a year      5  Anxiety  -     ALPRAZolam (XANAX) 0 5 mg tablet; Take 1 tablet (0 5 mg total) by mouth daily as needed for anxiety    6  Anemia, unspecified type  Assessment & Plan:  Patient patient's H&H stable no obvious bleeding we will monitor      7   Stable angina pectoris [facility])  Assessment & Plan:  Patient had a chest pain more than a month ago and relieved with nitroglycerin and again got chest pain with respiratory distress just before hospitalization at present asymptomatic no chest pain no difficulty breathing stable following is the records reviewed from the hospital discussed with the patient at length    Patient reported mid sternal chest pressure that began with sudden onset GARCIA and SOB  S/p 325 mg ASA and Nitro paste in ED with resolution of pain  • Hx of Prinzmetal Angina - Recent stress test 10/2021 with no ischemia noted  • Noted to be tachycardic in the 140s, Improved after 15 mg IV Cardizem  • HS trop: 9->14->21  • Cardiology following:  ? Likely due to demand from [facility] CHF, less likely suspect ACS  ? Continue Toprol 50 mg q am and 25 mg qHS  ? 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[address] )  Assessment & Plan:  Cardiomyopathy possibly due to viral etiology and last echocardiogram ejection fraction about 40 to 81% systolic diastolic dysfunction followed by cardiology no chest pain for now          Discussion Summary and Plan: Today's care plan and medications were reviewed with patient in detail and all their questions answered to their satisfaction  Chief Complaint   Patient presents with   • Follow-up     Wants an excuse for work for the time she was in the hospital  Doesn't feel herself no energy  Just wants to sleep     Posthospitalization checkup  Subjective:   Came in for follow-up after being discharged in the hospital seen by cardiology now on monitoring for nonsustained ventricular tachycardia continuous monitoring and also intractable left knee pain seen by orthopedist arthrocentesis done awaiting MRI on Voltaren pills no improvement all the hospital record reviewed also Doppler lower extremity done with no DVT and the patient went to have at home vital sign monitoring and at the same time has 2 weeks of Holter cardiac monitoring at present time also advised to check BMP for any electrolyte imbalance due to patient being Lasix 40 mg daily                      Madelin Rosario is a 58 y o  female patient Anisa Bishop 86 of cardiomyopathy with combined heart failure, T2DM, hypertension, NPVT who originally presented to the hospital on 1/27/2023 due to  shortness of breath   Per patient, she was walking around after work completing errands when she became short of breath   She noticed her breathing was worse when she moved around and improved slightly when she sat still   Patient also reports midsternal chest pressure that began soon after the [address] presented immediately to the ED after noticing the chest pressure   Patient reports her breathing feels similar to previous heart failure exacerbations       In the ED, her laboratory evaluation was remarkable for an elevated BNP of 342, elevated hemoglobin of 17 7, elevated hematocrit at 52 1, elevated D-dimer at 0 87   She was also noted to be satting 90% on room air and was placed on nasal cannula   She was noted to be tachycardic in the 140s and hypertensive with an EKG that showed a tachycardic arrhythmia that improved after receiving 15 mg of IV Cardizem   Chest x-ray was completed that showed possible pulmonary congestion she was given 20 mg of IV Lasix   A CT PE study was completed that showed no evidence of a PE and showed bilateral pulmonary edema  Cardiology was consulted  Patient completed 4 days of IV diuresis with gradual improvement in respiratory status  Patient did not qualify for home oxygen upon discharge  Plan to transition to oral furosemide 40 Mg daily upon discharge, will add oral potassium supplementation with diuresis  Recommend repeat BMP within 5 to 7 days of discharge  Recommend close outpatient follow-up with primary cardiologist      Patient with episode of nonsustained paroxysmal ventricular tachycardia  Cardiology was following  Recommend increasing home metoprolol to 50 mg in the morning and 25 mg in the evening  Monitor reviewed, normal sinus rhythm with 1 run of nonsustained ventricular tachycardia  Recommend outpatient follow-up with primary cardiologist   Consider cardiac monitoring during follow-up  With adjustment in cardiac medication, Recommend discontinuing amlodipine upon discharge      While admitted patient with complaints of left leg pain worse with movement  Dimer mildly elevated  Lower extremity duplex negative for any acute or chronic DVTs  Continue supportive care upon      The following portions of the patient's history were reviewed and updated as appropriate: allergies, current medications, past family history, past medical history, past social history, past surgical history and problem list     Review of Systems   Constitutional: Positive for fatigue  Negative for activity change, appetite change, chills, diaphoresis, fever and unexpected weight change  HENT: Negative for congestion, dental problem, drooling, ear discharge, ear pain, facial swelling, hearing loss, mouth sores, nosebleeds, postnasal drip, rhinorrhea, sinus pressure, sneezing, sore throat, tinnitus, trouble swallowing and voice change  Eyes: Negative for photophobia, pain, discharge, redness, itching and visual disturbance  Respiratory: Positive for shortness of breath  Negative for apnea, cough, choking, chest tightness, wheezing and stridor  Cardiovascular: Positive for palpitations  Negative for chest pain and leg swelling  Gastrointestinal: Negative for abdominal distention, abdominal pain, anal bleeding, blood in stool, constipation, diarrhea, nausea, rectal pain and vomiting     Endocrine: Negative for cold intolerance, heat intolerance, polydipsia, polyphagia and polyuria  Genitourinary: Negative for decreased urine volume, difficulty urinating, dysuria, enuresis, flank pain, frequency, genital sores, hematuria and urgency  Musculoskeletal: Negative for arthralgias, back pain, gait problem, joint swelling, myalgias, neck pain and neck stiffness  Skin: Negative for color change, pallor, rash and wound  Allergic/Immunologic: Negative  Negative for environmental allergies, food allergies and immunocompromised state  Neurological: Positive for dizziness  Negative for tremors, seizures, syncope, facial asymmetry, speech difficulty, weakness, light-headedness, numbness and headaches  Psychiatric/Behavioral: Negative for agitation, behavioral problems, confusion, decreased concentration, dysphoric mood, hallucinations, self-injury, sleep disturbance and suicidal ideas  The patient is not nervous/anxious and is not hyperactive  Historical Information   Patient Active Problem List   Diagnosis   • Acquired deformity of foot   • Pain in both feet   • Congenital pes planus of right foot   • Congenital pes planus of left foot   • Hammer toe of left foot   • Harvey's neuroma of second interspace of right foot   • Right foot pain   • Hammer toe of right foot   • Word finding difficulty   • Hypertensive urgency   • Hyponatremia in the setting of hyperglycemia   • Prolonged QT interval   • HLD (hyperlipidemia)   • Nonsustained paroxysmal ventricular tachycardia   • Essential hypertension   • Blurry vision   • Diarrhea   • Type 2 diabetes mellitus treated with insulin ([address] )   • Overweight (BMI 25 0-29  9)   • 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(HCC)   • Acute on chronic combined systolic and diastolic congestive heart failure (HCC)   • Prinzmetal's angina (HCC)   • Deep vein thrombosis (DVT) of left lower extremity ([facility])   • Chest pain   • Acute respiratory failure with hypoxia ([facility])   • Acute on chronic combined systolic and diastolic CHF (congestive heart failure) ([facility] )   • Stable angina pectoris (HCC)   • Anemia, unspecified type     Past Medical History:   Diagnosis Date   • Abdominal pain    • Anxiety disorder    • Diabetes mellitus ([facility] )    • Dizziness    • Heart failure (HCC)    • High cholesterol    • Hypertension    • Lightheadedness    • Palpitations    • SOB (shortness of breath)      History reviewed  No pertinent surgical history  Social History     Substance and Sexual Activity   Alcohol Use Yes    Comment: social     Social History     Substance and Sexual Activity   Drug Use Never     Social History     Tobacco Use   Smoking Status Former   Smokeless Tobacco Former     History reviewed  No pertinent family history  Health Maintenance Due   Topic   • Hepatitis C Screening    • COVID-19 Vaccine (1)   • Pneumococcal Vaccine: Pediatrics (0 to 5 Years) and At-Risk Patients (6 to 59 Years) (1 - PCV)   • DM Eye Exam    • HIV Screening    • Annual Physical    • DTaP,Tdap,and Td Vaccines (1 - Tdap)   • Cervical Cancer Screening    • Breast Cancer Screening: Mammogram    • Colorectal Cancer Screening    • Influenza Vaccine (1)   • Kidney Health Evaluation: Microalbumin/Creatinine Ratio       Meds/Allergies       Current Outpatient Medications:   •  Alcohol Swabs 70 % PADS, May substitute brand based on insurance coverage   Check glucose TID , Disp: 100 each, Rfl: 0  •  ALPRAZolam (XANAX) 0 5 mg tablet, Take 1 tablet (0 5 mg total) by mouth daily as needed for anxiety, Disp: 30 tablet, Rfl: 1  •  aspirin 81 MG tablet, Take 81 mg by mouth daily, Disp: , Rfl:   •  atorvastatin (LIPITOR) 80 mg tablet, Take 1 tablet (80 mg total) by mouth every evening, Disp: 30 tablet, Rfl: 5  •  furosemide (LASIX) 40 mg tablet, Take 1 tablet (40 mg total) by mouth daily Do not start before February 1, 2023 , Disp: 30 tablet, Rfl: 0  •  glucose blood (Contour Next Test) test strip, Use 1 each 3 (three) times a day Use as instructed, Disp: , Rfl:   •  glucose blood (FREESTYLE TEST STRIPS) test strip, May substitute brand based on insurance coverage  Check glucose TID , Disp: 100 each, Rfl: 0  •  glucose monitoring kit (FREESTYLE) monitoring kit, May substitute brand based on insurance coverage  Check glucose TID , Disp: 1 each, Rfl: 0  •  HumaLOG KwikPen 100 units/mL injection pen, INJECT 6 UNITS 3 TIMES A DAY BY SUBCUTANEOUS ROUTE BEFORE MEALS , Disp: 15 mL, Rfl: 3  •  Insulin Pen Needle (BD Pen Needle Megan 2nd Gen) 32G X 4 MM MISC, For use with insulin pen  Pharmacy may dispense brand covered by insurance , Disp: 100 each, Rfl: 0  •  Insulin Pen Needle (BD Pen Needle Megan 2nd Gen) 32G X 4 MM MISC, For use with insulin pen  Pharmacy may dispense brand covered by insurance , Disp: 100 each, Rfl: 0  •  Lantus SoloStar 100 units/mL injection pen, INJECT 15 UNITS UNDER THE SKIN DAILY AT BEDTIME, Disp: 15 mL, Rfl: 3  •  losartan (COZAAR) 100 MG tablet, TAKE 1 TABLET BY MOUTH EVERY DAY, Disp: 30 tablet, Rfl: 5  •  metoprolol succinate (TOPROL-XL) 25 mg 24 hr tablet, Take 1 tablet (25 mg total) by mouth daily at bedtime, Disp: 30 tablet, Rfl: 0  •  metoprolol succinate (TOPROL-XL) 50 mg 24 hr tablet, Take 1 tablet (50 mg total) by mouth daily In the morning Do not start before February 1, 2023 , Disp: 30 tablet, Rfl: 0  •  Microlet Lancets MISC, USE TO TEST 3 TIMES A DAY, Disp: 100 each, Rfl: 10  •  nitroglycerin (NITROSTAT) 0 4 mg SL tablet, Place 0 4 mg under the tongue as needed, Disp: , Rfl:   •  potassium chloride (K-DUR,KLOR-CON) 20 mEq tablet, Take 1 tablet (20 mEq total) by mouth daily Do not start before February 1, 2023 , Disp: 14 tablet, Rfl: 0  •  Xigduo XR 5-1000 MG TB24, Take 1 tablet by mouth in the morning, Disp: 30 tablet, Rfl: 5      Objective:    Vitals:   /80   Pulse 64   Temp 98 1 °F (36 7 °C)   Resp 16   Ht 5' 4" (1 626 m)   Wt 73 5 kg (162 lb)   SpO2 96%   BMI 27 81 kg/m²   Body mass index is 27 81 kg/m²    Vitals:    02/08/23 1652   Weight: 73 5 kg (162 lb)       Physical Exam  Constitutional:       General: She is not in acute distress  Appearance: She is well-developed  She is not ill-appearing, toxic-appearing or diaphoretic  HENT:      Head: Normocephalic and atraumatic  Right Ear: External ear normal       Left Ear: External ear normal       Nose: Nose normal       Mouth/Throat:      Pharynx: No oropharyngeal exudate  Eyes:      General: Lids are normal  Lids are everted, no foreign bodies appreciated  No scleral icterus  Right eye: No discharge  Left eye: No discharge  Conjunctiva/sclera: Conjunctivae normal       Pupils: Pupils are equal, round, and reactive to light  Neck:      Thyroid: No thyromegaly  Vascular: Normal carotid pulses  No carotid bruit, hepatojugular reflux or JVD  Trachea: No tracheal tenderness or tracheal deviation  Cardiovascular:      Rate and Rhythm: Normal rate and regular rhythm  Pulses: Normal pulses  Heart sounds: Normal heart sounds  No murmur heard  No friction rub  No gallop  Pulmonary:      Effort: Pulmonary effort is normal  No respiratory distress  Breath sounds: No stridor  Rales present  No wheezing  Chest:      Chest wall: No tenderness  Abdominal:      General: Bowel sounds are normal  There is no distension  Palpations: Abdomen is soft  There is no mass  Tenderness: There is no abdominal tenderness  There is no guarding or rebound  Musculoskeletal:         General: No tenderness or deformity  Normal range of motion  Cervical back: Normal range of motion and neck supple  No edema, erythema or rigidity  No spinous process tenderness or muscular tenderness  Normal range of motion  Lymphadenopathy:      Head:      Right side of head: No submental, submandibular, tonsillar, preauricular or posterior auricular adenopathy  Left side of head: No submental, submandibular, tonsillar, preauricular, posterior auricular or occipital adenopathy        Cervical: No cervical adenopathy  Right cervical: No superficial, deep or posterior cervical adenopathy  Left cervical: No superficial, deep or posterior cervical adenopathy  Upper Body:      Right upper body: No pectoral adenopathy  Left upper body: No pectoral adenopathy  Skin:     General: Skin is warm and dry  Coloration: Skin is not pale  Findings: No erythema or rash  Neurological:      General: No focal deficit present  Mental Status: She is alert and oriented to person, place, and time  Cranial Nerves: No cranial nerve deficit  Sensory: No sensory deficit  Motor: No tremor, abnormal muscle tone or seizure activity  Coordination: Coordination normal       Gait: Gait normal       Deep Tendon Reflexes: Reflexes are normal and symmetric  Reflexes normal    Psychiatric:         Behavior: Behavior normal          Thought Content: Thought content normal          Judgment: Judgment normal          Lab Review   No results displayed because visit has over 200 results  Patient Instructions     Heart Failure   AMBULATORY CARE:   Heart failure  is a condition that does not allow your heart to fill or pump properly  Not enough oxygen in your blood gets to your organs and tissues  Fluid may not move through your body properly  Fluid builds up and causes swelling and trouble breathing  This is known as congestive heart failure  Heart failure may start in the left or right ventricle  Heart failure is often caused by damage or injury to your heart  The damage may be caused by other heart problems, diabetes, or high blood pressure  The damage may have also been caused by an infection  Heart failure is a long-term condition that tends to get worse over time  It is important to manage your health to improve your quality of life         Common signs and symptoms:   · Trouble breathing with activity that worsens to trouble breathing at rest    · Shortness of breath while lying flat    · Severe shortness of breath and coughing at night that usually wakes you    · Feeling lightheaded when you stand up    · Purple color around your mouth and nails    · Confusion or anxiety    · Chest pain at night    · Periods of no breathing, then breathing fast    · Lack of energy (often worsened by physical activity), or trouble sleeping    · Swelling in your ankles, legs, or abdomen    · Heartbeat that is fast or not regular    · Fingers and toes feel cool to the touch    Call your local emergency number (911 in the [address] [facility]) if:   1  You have any of the following signs of a heart attack:      1  Squeezing, pressure, or pain in your chest    2  You may  also have any of the following:     § Discomfort or pain in your back, neck, jaw, stomach, or arm    § Shortness of breath    § Nausea or vomiting    § Lightheadedness or a sudden cold sweat      Call your doctor if:   1  Your heartbeat is fast, slow, or uneven all the time  2  You have symptoms of worsening heart failure:      ? Shortness of breath at rest, at night, or that is getting worse in any way    ? Weight gain of 3 or more pounds (1 4 kg) in a day, or more than your healthcare provider says is okay    ? More swelling in your legs or ankles    ? Abdominal pain or swelling    ? More coughing    ? Loss of appetite    ? Feeling tired all the time    3  You feel hopeless or depressed, or you have lost interest in things you used to enjoy  4  You often feel worried or afraid  5  You have questions or concerns about your condition or care  Treatment for heart failure  may include any of the following:  · Medicines  may be needed to help regulate your heart rhythm  You may also need medicines to lower your blood pressure, and to decrease extra fluids  · Oxygen  may help you breathe easier if your oxygen level is lower than normal  A CPAP machine may be used to keep your airway open while you sleep           · Cardiac rehab  is a program run by specialists who will help you safely strengthen your heart  In the program you will learn about exercise, relaxation, stress management, and heart-healthy nutrition  Cardiac rehab may be recommended if your heart failure is not severe  · Surgery  can be done to implant a pacemaker or another device in your chest to regulate your heart rhythm  Other types of surgery can open blocked heart vessels, replace a damaged heart valve, or remove scar tissue  Manage swelling from extra fluid:   · Elevate (raise) your legs above the level of your heart  This will help with fluid that builds up in your legs or ankles  Elevate your legs as often as possible during the day  Prop your legs on pillows or blankets to keep them elevated comfortably  Try not to stand for long periods of time during the day  Move around to keep your blood circulating  · Limit sodium (salt)  Ask how much sodium you can have each day  Your healthcare provider may give you a limit, such as 2,300 milligrams (mg) a day  Your provider or a dietitian can teach you how to read food labels for the number of mg in a food  He or she can also help you find ways to have less salt  For example, if you add salt to food as you cook, do not add more at the table  · Drink liquids as directed  You may need to limit the amount of liquid you drink within 24 hours  Your healthcare provider will tell you how much liquid to have and which liquids are best for you  He or she may tell you to limit liquid to 1 5 to 2 liters in a day  He or she will also tell you how often to drink liquid throughout the day  · Weigh yourself every morning  Use the same scale, in the same spot  Do this after you use the bathroom, but before you eat or drink  Wear the same type of clothing each time  Write down your weight and call your healthcare provider if you have a sudden weight gain  Swelling and weight gain are signs of fluid buildup         Manage heart failure: Your quality of life may improve with treatment and the following:  · Do not smoke  Nicotine and other chemicals in cigarettes and cigars can cause lung and heart damage  Ask your healthcare provider for information if you currently smoke and need help to quit  E-cigarettes or smokeless tobacco still contain nicotine  Talk to your healthcare provider before you use these products  · Do not drink alcohol or use illegal drugs  Alcohol and drugs can increase your risk for high blood pressure, diabetes, and coronary artery disease  · Eat heart-healthy foods  Heart-healthy foods include fruits, vegetables, lean meat (such as beef, chicken, or pork), and low-fat dairy products  Fatty fish such as salmon and tuna are also heart healthy  Other heart-healthy foods include walnuts, whole-grain breads, beans, and cooked beans  Replace butter and margarine with heart-healthy oils such as olive oil or canola oil  Your provider or a dietitian can help you create heart-healthy meal plans  · Manage any chronic health conditions you have  These include high blood pressure, diabetes, obesity, high cholesterol, metabolic syndrome, and COPD  You will have fewer symptoms if you manage these health conditions  Follow your healthcare provider's recommendations and follow up with him or her regularly  · Maintain a healthy weight  Being overweight can increase your risk for high blood pressure, diabetes, and coronary artery disease  These conditions can make your symptoms worse  Ask your healthcare provider how much you should weigh  Ask him or her to help you create a weight loss plan if you are overweight  · Stay active  Activity can help keep your symptoms from getting worse  Walking is a type of physical activity that helps maintain your strength and improve your mood  Physical activity also helps you manage your weight   Work with your healthcare provider to create an exercise plan that is right for you          · Get vaccines as directed  The flu and pneumonia can be severe for a person who has heart failure  Vaccines protect you from these infections  Get a flu shot every year as soon as it is recommended, usually in September or October  You may also need the pneumonia vaccine  Your healthcare provider can tell you if you need other vaccines, and when to get them  Follow up with your doctor or cardiologist within 7 days or as directed: You may need to return for other tests  You may need home health care  A healthcare provider will monitor your vital signs, weight, and make sure your medicines are working  Write down your questions so you remember to ask them during your visits  Join a support group:  Heart failure can be difficult to manage  It may be helpful to talk with others who have heart failure  You may learn how to better manage your condition or get emotional support  For more information:  · [address]  Tyler Mcarthurdeborahivy   Phone: 2- 092 - 174-7979  Web Address: https://mobintent/  [address] [facility] 2022 Information is for End User's use only and may not be sold, redistributed or otherwise used for commercial purposes  All illustrations and images included in CareNotes® are the copyrighted property of A D A M , Inc  or [address] [place] [address]  The above information is an  only  It is not intended as medical advice for individual conditions or treatments  Talk to your doctor, nurse or pharmacist before following any medical regimen to see if it is safe and effective for you  Dinorah Garcia MD        "This note has been constructed using a voice recognition system  Therefore there may be syntax, spelling, and/or grammatical errors   Please call if you have any questions  "

## 2023-02-08 NOTE — ASSESSMENT & PLAN NOTE
Lab Results   Component Value Date    HGBA1C 7 1 (H) 12/05/2022   Patient blood sugar seems to be reasonably controlled the basis of A1c after discharge continue Xigduo and Lantus 15 in the evening along with NovoLog 6 units with each meal diabetic diet hypoglycemia protocol in place foot exam normal dilated eye exam once a year

## 2023-02-08 NOTE — ASSESSMENT & PLAN NOTE
Wt Readings from Last 3 Encounters:   01/31/23 71 8 kg (158 lb 6 4 oz)   12/07/22 74 4 kg (164 lb)   09/27/22 74 4 kg (164 lb)   Patient presented with acute onset GARCIA, SOB, and chest pressure  Reports GARCIA and SOB begin after walking around completing errands, SOB improves at rest  Per patient, was told to stop PO Lasix a few months ago and to only take PRN for leg swelling  • Echo 5/2021 - LVEF 45-50%, grade 1 DD  • CTA chest: No pulmonary embolus  Extensive septal thickening and groundglass opacity due to interstitial and alveolar edema with trace effusions  • BNP: 342  S/P 20 mg IV Lasix in ED  • ECHO : EF 40%, moderately reduced systolic function with global longitudinal strain reduced -10%  Grade 1 diastolic dysfunction  There is abnormal septal motion consistent with LBBB  • Net negative urine output and decrease in weight, unclear of accuracy initially on admission  • Cardiology consulted:  ? Recommend furosemide 40 mg p o  daily continue  ?  Recommend close outpatient follow-up with primary cardiologist  • Continue 1800 mL Fluid restriction   Monitor Daily weights, I&Os

## 2023-03-22 ENCOUNTER — OFFICE VISIT (OUTPATIENT)
Dept: INTERNAL MEDICINE CLINIC | Facility: CLINIC | Age: 63
End: 2023-03-22

## 2023-03-22 VITALS
TEMPERATURE: 98 F | HEART RATE: 72 BPM | HEIGHT: 64 IN | WEIGHT: 161 LBS | DIASTOLIC BLOOD PRESSURE: 80 MMHG | RESPIRATION RATE: 15 BRPM | SYSTOLIC BLOOD PRESSURE: 130 MMHG | BODY MASS INDEX: 27.49 KG/M2 | OXYGEN SATURATION: 96 %

## 2023-03-22 DIAGNOSIS — N18.32 STAGE 3B CHRONIC KIDNEY DISEASE (HCC): ICD-10-CM

## 2023-03-22 DIAGNOSIS — I10 ESSENTIAL HYPERTENSION: ICD-10-CM

## 2023-03-22 DIAGNOSIS — B33.24 VIRAL CARDIOMYOPATHY (HCC): ICD-10-CM

## 2023-03-22 DIAGNOSIS — E03.9 HYPOTHYROIDISM, UNSPECIFIED TYPE: ICD-10-CM

## 2023-03-22 DIAGNOSIS — I50.43 ACUTE ON CHRONIC COMBINED SYSTOLIC AND DIASTOLIC CHF (CONGESTIVE HEART FAILURE) (HCC): ICD-10-CM

## 2023-03-22 DIAGNOSIS — E11.9 TYPE 2 DIABETES MELLITUS TREATED WITH INSULIN (HCC): ICD-10-CM

## 2023-03-22 DIAGNOSIS — E78.2 MIXED HYPERLIPIDEMIA: ICD-10-CM

## 2023-03-22 DIAGNOSIS — N39.0 URINARY TRACT INFECTION WITHOUT HEMATURIA, SITE UNSPECIFIED: ICD-10-CM

## 2023-03-22 DIAGNOSIS — F41.9 ANXIETY: ICD-10-CM

## 2023-03-22 DIAGNOSIS — Z79.4 TYPE 2 DIABETES MELLITUS TREATED WITH INSULIN (HCC): ICD-10-CM

## 2023-03-22 DIAGNOSIS — I47.20 VENTRICULAR TACHYCARDIA (HCC): Primary | ICD-10-CM

## 2023-03-22 DIAGNOSIS — I20.1 PRINZMETAL'S ANGINA (HCC): ICD-10-CM

## 2023-03-22 DIAGNOSIS — D64.9 ANEMIA, UNSPECIFIED TYPE: ICD-10-CM

## 2023-03-22 RX ORDER — SACUBITRIL AND VALSARTAN 49; 51 MG/1; MG/1
1 TABLET, FILM COATED ORAL 2 TIMES DAILY
COMMUNITY

## 2023-03-22 RX ORDER — NITROFURANTOIN 25; 75 MG/1; MG/1
100 CAPSULE ORAL 2 TIMES DAILY
Qty: 10 CAPSULE | Refills: 0 | Status: SHIPPED | OUTPATIENT
Start: 2023-03-22 | End: 2023-03-27

## 2023-03-22 RX ORDER — ALPRAZOLAM 0.5 MG/1
0.5 TABLET ORAL DAILY PRN
Qty: 30 TABLET | Refills: 1 | Status: SHIPPED | OUTPATIENT
Start: 2023-03-22

## 2023-03-22 NOTE — ASSESSMENT & PLAN NOTE
Lab Results   Component Value Date    EGFR 57 01/31/2023    EGFR 60 01/30/2023    EGFR 52 01/29/2023    CREATININE 1 05 01/31/2023    CREATININE 1 00 01/30/2023    CREATININE 1 13 01/29/2023   Patient's GFR has been down to 41 creatinine is up 1 44 done on February 28, 2023 avoid nephrotoxic worsening  GFR is baseline  Creat is baseline  Recommend periodic blood test for monitoring of BUN and Creat  Avoid Non Steroidal Antiinflammatory such as ibuprofen, aleve etc   Potassium, HCO3 and calcium are wnl and will require periodic blood test   Bone and Mineral disease monitoring as appropriate    All patient with GFR less than 30( CKD 4 or 5 or 6) advised to follow with nephrologist

## 2023-03-22 NOTE — LETTER
March 22, 2023     Patient: Sebas Dean  YOB: 1960  Date of Visit: 3/22/2023      To Whom it May Concern:    Choco Rodas is under my professional care  Huan Mao was seen in my office on 3/22/2023  Huan Mao is advised to return to work on March 27, 2023 until then no work  If you have any questions or concerns, please don't hesitate to call           Sincerely,          [address], [place]        CC: No Recipients

## 2023-03-22 NOTE — PROGRESS NOTES
BMI Counseling: Body mass index is 27 64 kg/m²  The BMI is above normal  Nutrition recommendations include decreasing portion sizes, encouraging healthy choices of fruits and vegetables, decreasing fast food intake, consuming healthier snacks, limiting drinks that contain sugar, moderation in carbohydrate intake, increasing intake of lean protein, reducing intake of saturated and trans fat and reducing intake of cholesterol  Exercise recommendations include moderate physical activity 150 minutes/week and exercising 3-5 times per week  No pharmacotherapy was ordered  Rationale for BMI follow-up plan is due to patient being overweight or obese

## 2023-03-22 NOTE — PATIENT INSTRUCTIONS
Heart Failure   AMBULATORY CARE:   Heart failure  is a condition that does not allow your heart to fill or pump properly  Not enough oxygen in your blood gets to your organs and tissues  Fluid may not move through your body properly  Fluid may build up and cause swelling and trouble breathing  This is known as congestive heart failure  It is important to manage your health to improve your quality of life  Signs and symptoms  depend on the type of heart failure you have and how severe it is  You may have any of the following:  Trouble breathing with activity that worsens to trouble breathing at rest    Shortness of breath while lying flat    Severe shortness of breath and coughing at night that usually wakes you    Feeling lightheaded when you stand up    Purple color around your mouth and nails    Confusion or anxiety    Chest pain at night    Periods of no breathing, then breathing fast    Lack of energy (often worsened by physical activity), or trouble sleeping    Swelling in your ankles, legs, or abdomen    Heartbeat that is fast or not regular    Fingers and toes feel cool to the touch    Call your local emergency number (911 in the [facility] [facility] [facility]) if:   You have any of the following signs of a heart attack:      Squeezing, pressure, or pain in your chest    You may  also have any of the following:     Discomfort or pain in your back, neck, jaw, stomach, or arm    Shortness of breath    Nausea or vomiting    Lightheadedness or a sudden cold sweat      Seek immediate care if:   Your heartbeat is fast, slow, or uneven all the time        Call your doctor if:   You have symptoms of worsening heart failure:      Shortness of breath at rest, at night, or that is getting worse in any way    Weight gain of 3 or more pounds (1 4 kg) in a day, or more than your healthcare provider says is okay    More swelling in your legs or ankles    Abdominal pain or swelling    More coughing    Loss of appetite    Feeling tired all the time    You feel depressed, or you have lost interest in things you used to enjoy  You often feel worried or afraid  You have questions or concerns about your condition or care  Treatment:  Heart failure is often caused by damage or injury to your heart  The damage may be caused by other heart problems, diabetes, or high blood pressure  The damage may have also been caused by an infection  Your healthcare providers will help you manage any other health conditions that may be causing your heart failure  The goals of treatment are to manage, slow, or reverse heart damage  Treatment may include any of the following:  Medicines  may be needed to help regulate your heart rhythm  You may also need medicines to lower your blood pressure, and to decrease extra fluids  Oxygen  may help you breathe easier if your oxygen level is lower than normal  A CPAP machine may be used to keep your airway open while you sleep  Cardiac rehab  is a program run by specialists who will help you safely strengthen your heart  In the program you will learn about exercise, relaxation, stress management, and heart-healthy nutrition  Cardiac rehab may be recommended if your heart failure is not severe  Surgery  can be done to implant a pacemaker or another device in your chest to regulate your heart rhythm  Other types of surgery can open blocked heart vessels, replace a damaged heart valve, or remove scar tissue  Manage swelling from extra fluid:   Elevate (raise) your legs above the level of your heart  This will help with fluid that builds up in your legs or ankles  Elevate your legs as often as possible during the day  Prop your legs on pillows or blankets to keep them elevated comfortably  Try not to stand for long periods of time during the day  Move around to keep your blood circulating  Limit sodium (salt)  Ask how much sodium you can have each day   Your healthcare provider may give you a limit, such as 2,300 milligrams (mg) a day  Your provider or a dietitian can teach you how to read food labels for the number of mg in a food  He or she can also help you find ways to have less salt  For example, if you add salt to food as you cook, do not add more at the table  Drink liquids as directed  You may need to limit the amount of liquid you drink within 24 hours  Your healthcare provider will tell you how much liquid to have and which liquids are best for you  He or she may tell you to limit liquid to 1 5 to 2 liters in a day  He or she will also tell you how often to drink liquid throughout the day  Weigh yourself every morning  Use the same scale, in the same spot  Do this after you use the bathroom, but before you eat or drink  Wear the same type of clothing each time  Write down your weight and call your healthcare provider if you have a sudden weight gain  Swelling and weight gain are signs of fluid buildup  Manage heart failure: Your quality of life may improve with treatment and the following:  Do not smoke  Nicotine and other chemicals in cigarettes and cigars can cause lung and heart damage  Ask your healthcare provider for information if you currently smoke and need help to quit  E-cigarettes or smokeless tobacco still contain nicotine  Talk to your healthcare provider before you use these products  Do not drink alcohol or use illegal drugs  Alcohol and drugs can increase your risk for high blood pressure, diabetes, and coronary artery disease  Eat heart-healthy foods  Heart-healthy foods include fruits, vegetables, lean meat (such as beef, chicken, or pork), and low-fat dairy products  Fatty fish such as salmon and tuna are also heart healthy  Other heart-healthy foods include walnuts, whole-grain breads, and legumes such as ferrell beans  Replace butter and margarine with heart-healthy oils such as olive oil or canola oil   Your provider or a dietitian can help you create heart-healthy meal plans  Manage any chronic health conditions you have  These include high blood pressure, diabetes, obesity, high cholesterol, metabolic syndrome, and COPD  You will have fewer symptoms if you manage these health conditions  Follow your healthcare provider's recommendations and follow up with him or her regularly  Maintain a healthy weight  Being overweight can increase your risk for high blood pressure, diabetes, and coronary artery disease  These conditions can make your symptoms worse  Ask your healthcare provider what a healthy weight is for you  Ask him or her to help you create a weight loss plan, if needed  Stay active  Activity can help keep your symptoms from getting worse  Walking is a type of physical activity that helps maintain your strength and improve your mood  Physical activity also helps you manage your weight  Work with your healthcare provider to create an exercise plan that is right for you  Get vaccines as directed  The flu, COVID-19, and pneumonia can be severe for a person who has heart failure  Vaccines protect you from these infections  Get a flu vaccine every year as soon as it is recommended, usually in September or October  Get a COVID-19 vaccine and booster as directed  You may also need the pneumonia vaccine  Your healthcare provider can tell you if you need other vaccines, and when to get them  Follow up with your doctor or cardiologist within 7 days or as directed: You may need to return for other tests  You may need home health care  A healthcare provider will monitor your vital signs, weight, and make sure your medicines are working  Write down your questions so you remember to ask them during your visits  For support or more information:  Heart failure can be difficult to manage  It may be helpful to talk with others who have heart failure  You may learn how to better manage your condition or get emotional support   For more information:  [address]  Tyler Mcarthur   Phone: 9- 944 - [phone]  Web Address: https://www strong com/  org     © Copyright Katerina Lopez 2022 Information is for End User's use only and may not be sold, redistributed or otherwise used for commercial purposes  The above information is an  only  It is not intended as medical advice for individual conditions or treatments  Talk to your doctor, nurse or pharmacist before following any medical regimen to see if it is safe and effective for you

## 2023-03-23 ENCOUNTER — TELEPHONE (OUTPATIENT)
Dept: ADMINISTRATIVE | Facility: OTHER | Age: 63
End: 2023-03-23

## 2023-03-23 PROBLEM — I20.89 STABLE ANGINA PECTORIS: Status: RESOLVED | Noted: 2023-02-08 | Resolved: 2023-03-23

## 2023-03-23 PROBLEM — I20.8 STABLE ANGINA PECTORIS (HCC): Status: RESOLVED | Noted: 2023-02-08 | Resolved: 2023-03-23

## 2023-03-23 NOTE — ASSESSMENT & PLAN NOTE
Followed by cardiology last echocardiogram ejection fraction 45 to 50% the recurrent nonsustained ventricular tachycardia to be seen by cardiology considering ICD

## 2023-03-23 NOTE — ASSESSMENT & PLAN NOTE
Lab Results   Component Value Date    HGBA1C 7 1 (H) 12/05/2022   Patient's blood sugar reasonably controlled on the basis of A1c continue Xigduo and Lantus 15 units in the evening NovoLog with each meal continue monitoring blood sugar at home hypoglycemia protocol in place advised dilated eye exam foot exam normal

## 2023-03-23 NOTE — ASSESSMENT & PLAN NOTE
Patient is having nonsustained ventricular tachycardia evaluated by cardiology awaiting appointment for possible ICD

## 2023-03-23 NOTE — LETTER
Diabetic Eye Exam Form    Date Requested: 23  Patient: Milo Saucedo  Patient : 1960   Referring Provider: Jefry Pearl MD    2nd Request      DIABETIC Eye Exam Date _______________________________      Type of Exam MUST be documented for Diabetic Eye Exams  Please CHECK ONE  Retinal Exam       Dilated Retinal Exam       OCT       Optomap-Iris Exam      Fundus Photography       Left Eye - Please check Retinopathy or No Retinopathy        Exam did show retinopathy    Exam did not show retinopathy       Right Eye - Please check Retinopathy or No Retinopathy       Exam did show retinopathy    Exam did not show retinopathy       Comments __________________________________________________________    Practice Providing Exam ______________________________________________    Exam Performed By (print name) _______________________________________      Provider Signature ___________________________________________________      These reports are needed for  compliance  Please fax this completed form and a copy of the Diabetic Eye Exam report to our office located at [place] [address] as soon as possible via Fax [phone] primo Bustilloign: Phone [phone]  We thank you for your assistance in treating our mutual patient

## 2023-03-23 NOTE — LETTER
Diabetic Eye Exam Form    Date Requested: 23  Patient: Milo Saucedo  Patient : 1960   Referring Provider: Jefry Pearl MD      DIABETIC Eye Exam Date _______________________________      Type of Exam MUST be documented for Diabetic Eye Exams  Please CHECK ONE  Retinal Exam       Dilated Retinal Exam       OCT       Optomap-Iris Exam      Fundus Photography       Left Eye - Please check Retinopathy or No Retinopathy        Exam did show retinopathy    Exam did not show retinopathy       Right Eye - Please check Retinopathy or No Retinopathy       Exam did show retinopathy    Exam did not show retinopathy       Comments __________________________________________________________    Practice Providing Exam ______________________________________________    Exam Performed By (print name) _______________________________________      Provider Signature ___________________________________________________      These reports are needed for  compliance  Please fax this completed form and a copy of the Diabetic Eye Exam report to our office located at [place] [address] as soon as possible via Fax [phone] primo Bustilloign: Phone [phone]  We thank you for your assistance in treating our mutual patient

## 2023-03-23 NOTE — TELEPHONE ENCOUNTER
----- Message from [facility] sent at 3/22/2023  5:49 PM EDT -----  Regarding: care gap request  03/22/23 5:49 PM    Hello, our patient attached above has had Diabetic Eye Exam completed/performed  Please assist in updating the patient chart by making an External outreach to [facility] facility located in [address] [place] [address]  The date of service is one or two months ago      Thank you,  [facility]  [facility] INTERNAL MED

## 2023-03-23 NOTE — PROGRESS NOTES
Dr Dmitry Marquez Office Visit Note  23     Michael Martin 61 y o  female MRN: [id number]  : 1960    Assessment:     1  Ventricular tachycardia  Assessment & Plan:  Patient is having nonsustained ventricular tachycardia evaluated by cardiology awaiting appointment for possible ICD    Orders:  -     Comprehensive metabolic panel; Future; Expected date: 2023    2  Type 2 diabetes mellitus treated with insulin [facility])  Assessment & Plan:    Lab Results   Component Value Date    HGBA1C 7 1 (H) 2022   Patient's blood sugar reasonably controlled on the basis of A1c continue Xigduo and Lantus 15 units in the evening NovoLog with each meal continue monitoring blood sugar at home hypoglycemia protocol in place advised dilated eye exam foot exam normal    Orders:  -     Comprehensive metabolic panel; Future; Expected date: 2023  -     Microalbumin / creatinine urine ratio; Future; Expected date: 2023  -     Hemoglobin A1c (w/out EAG) ([facility] ONLY); Future; Expected date: 2023    3  Essential hypertension  Assessment & Plan:  Blood pressure seems to be controlled for now continue low-salt diet metoprolol Entresto      4  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([facility] [place] [address] )  Assessment & Plan:  Followed by cardiology last echocardiogram ejection fraction 45 to 50% the recurrent nonsustained ventricular tachycardia to be seen by cardiology considering ICD    Orders:  -     Comprehensive metabolic panel; Future; Expected date: 2023    5  Prinzmetal's angina ([facility] [place] [address] )    6   Acute on chronic combined systolic and diastolic CHF (congestive heart failure) (HCC)  Assessment & Plan:  Wt Readings from Last 3 Encounters:   23 73 kg (161 lb)   23 73 5 kg (162 lb)   23 71 8 kg (158 lb 6 4 oz)   3  For now patient seems euvolemic CHF seems to be compensated continue low-salt diet fluid restriction patient recently started on Entresto losartan discontinued on the Lasix and following is the work-up findings and reviewed the note from cardiology as follows    • Echo 5/2021 - LVEF 45-50%, grade 1 DD  • CTA chest: No pulmonary embolus  Extensive septal thickening and groundglass opacity due to interstitial and alveolar edema with trace effusions  • BNP: 342  S/P 20 mg IV Lasix in ED  • ECHO today: EF 40%, moderately reduced systolic function with global longitudinal strain reduced -10%  Grade 1 diastolic dysfunction  There is abnormal septal motion consistent with LBBB  • Net negative urine output and decrease in weight, unclear of accuracy initially on admission  • Cardiology consulted:  ? Recommend furosemide 40 mg p o  daily upon discharge  ? Recommend close outpatient follow-up with primary cardiologist  • Continue 1800 mL Fluid restriction   • Monitor Daily weights, I&Os            Orders:  -     Comprehensive metabolic panel; Future; Expected date: 03/22/2023    7  Anemia, unspecified type  -     CBC and differential; Future; Expected date: 03/22/2023    8  Mixed hyperlipidemia  -     Lipid Panel with Direct LDL reflex; Future; Expected date: 03/22/2023    9  Hypothyroidism, unspecified type  -     TSH, 3rd generation with Free T4 reflex; Future; Expected date: 03/22/2023    10  Anxiety  -     ALPRAZolam (XANAX) 0 5 mg tablet; Take 1 tablet (0 5 mg total) by mouth daily as needed for anxiety    11  Urinary tract infection without hematuria, site unspecified  -     Urine culture  -     Urinalysis with microscopic; Future  -     nitrofurantoin (MACROBID) 100 mg capsule; Take 1 capsule (100 mg total) by mouth 2 (two) times a day for 5 days    12   Stage 3b chronic kidney disease [facility])  Assessment & Plan:  Lab Results   Component Value Date    EGFR 57 01/31/2023    EGFR 60 01/30/2023    EGFR 52 01/29/2023    CREATININE 1 05 01/31/2023    CREATININE 1 00 01/30/2023    CREATININE 1 13 01/29/2023   Patient's GFR has been down to 41 creatinine is up 1 44 done on February 28, 2023 avoid nephrotoxic worsening  GFR is baseline  Creat is baseline  Recommend periodic blood test for monitoring of BUN and Creat  Avoid Non Steroidal Antiinflammatory such as ibuprofen, aleve etc   Potassium, HCO3 and calcium are wnl and will require periodic blood test   Bone and Mineral disease monitoring as appropriate  All patient with GFR less than 30( CKD 4 or 5 or 6) advised to follow with nephrologist             Discussion Summary and Plan: Today's care plan and medications were reviewed with patient in detail and all their questions answered to their satisfaction  Chief Complaint   Patient presents with   • Follow-up      Subjective:  Patient came in follow-up chronic medical condition and with complaint of profound weakness fatigue some difficulty breathing and mild to moderate exertion recently started on Entresto for chronic combined systolic diastolic CHF and having recurrent ventricular tachycardia on outpatient monitoring awaiting to be seen by cardiology for considering ICD repeat echo done verbal report ejection fraction about 45 to 50% also repeat BMP showed GFR creatinine was repeat the labs reviewed we will repeat the labs in 1 week and follow-up in 1 week      The following portions of the patient's history were reviewed and updated as appropriate: allergies, current medications, past family history, past medical history, past social history, past surgical history and problem list     Review of Systems   Constitutional: Positive for fatigue  Negative for activity change, appetite change, chills, diaphoresis, fever and unexpected weight change  HENT: Negative for congestion, dental problem, drooling, ear discharge, ear pain, facial swelling, hearing loss, mouth sores, nosebleeds, postnasal drip, rhinorrhea, sinus pressure, sneezing, sore throat, tinnitus, trouble swallowing and voice change  Eyes: Negative for photophobia, pain, discharge, redness, itching and visual disturbance     Respiratory: Positive for shortness of breath  Negative for apnea, cough, choking, chest tightness, wheezing and stridor  Cardiovascular: Positive for palpitations  Negative for chest pain and leg swelling  Gastrointestinal: Negative for abdominal distention, abdominal pain, anal bleeding, blood in stool, constipation, diarrhea, nausea, rectal pain and vomiting  Endocrine: Negative for cold intolerance, heat intolerance, polydipsia, polyphagia and polyuria  Genitourinary: Negative for decreased urine volume, difficulty urinating, dysuria, enuresis, flank pain, frequency, genital sores, hematuria and urgency  Musculoskeletal: Positive for arthralgias  Negative for back pain, gait problem, joint swelling, myalgias, neck pain and neck stiffness  Skin: Negative for color change, pallor, rash and wound  Allergic/Immunologic: Negative  Negative for environmental allergies, food allergies and immunocompromised state  Neurological: Negative for dizziness, tremors, seizures, syncope, facial asymmetry, speech difficulty, weakness, light-headedness, numbness and headaches  Psychiatric/Behavioral: Negative for agitation, behavioral problems, confusion, decreased concentration, dysphoric mood, hallucinations, self-injury, sleep disturbance and suicidal ideas  The patient is not nervous/anxious and is not hyperactive            Historical Information   Patient Active Problem List   Diagnosis   • Acquired deformity of foot   • Pain in both feet   • Congenital pes planus of right foot   • Congenital pes planus of left foot   • Hammer toe of left foot   • Harvey's neuroma of second interspace of right foot   • Right foot pain   • Hammer toe of right foot   • Word finding difficulty   • Hypertensive urgency   • Hyponatremia in the setting of hyperglycemia   • Prolonged QT interval   • HLD (hyperlipidemia)   • Nonsustained paroxysmal ventricular tachycardia   • Essential hypertension   • Blurry vision   • Diarrhea   • Type 2 diabetes mellitus treated with insulin ([place] [address] )   • Overweight (BMI 25 0-29  9)   • 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([place] [address] )   • Acute on chronic combined systolic and diastolic congestive heart failure (HCC)   • Prinzmetal's angina (HCC)   • Deep vein thrombosis (DVT) of left lower extremity (HCC)   • Chest pain   • Acute respiratory failure with hypoxia (HCC)   • Acute on chronic combined systolic and diastolic CHF (congestive heart failure) ([facility])   • Anemia, unspecified type   • Ventricular tachycardia   • Stage 3b chronic kidney disease (HCC)     Past Medical History:   Diagnosis Date   • Abdominal pain    • Anxiety disorder    • Diabetes mellitus ([place] [address] )    • Dizziness    • Heart failure (HCC)    • High cholesterol    • Hypertension    • Lightheadedness    • Palpitations    • SOB (shortness of breath)      No past surgical history on file  Social History     Substance and Sexual Activity   Alcohol Use Yes    Comment: social     Social History     Substance and Sexual Activity   Drug Use Never     Social History     Tobacco Use   Smoking Status Former   Smokeless Tobacco Former     No family history on file  Health Maintenance Due   Topic   • Hepatitis C Screening    • COVID-19 Vaccine (1)   • Pneumococcal Vaccine: Pediatrics (0 to 5 Years) and At-Risk Patients (6 to 59 Years) (1 - PCV)   • DM Eye Exam    • HIV Screening    • Annual Physical    • DTaP,Tdap,and Td Vaccines (1 - Tdap)   • Cervical Cancer Screening    • Breast Cancer Screening: Mammogram    • Colorectal Cancer Screening    • Influenza Vaccine (1)   • Kidney Health Evaluation: Microalbumin/Creatinine Ratio    • HEMOGLOBIN A1C       Meds/Allergies       Current Outpatient Medications:   •  Alcohol Swabs 70 % PADS, May substitute brand based on insurance coverage   Check glucose TID , Disp: 100 each, Rfl: 0  •  ALPRAZolam (XANAX) 0 5 mg tablet, Take 1 tablet (0 5 mg total) by mouth daily as needed for anxiety, Disp: 30 tablet, Rfl: 1  •  aspirin 81 MG tablet, Take 81 mg by mouth daily, Disp: , Rfl:   •  atorvastatin (LIPITOR) 80 mg tablet, Take 1 tablet (80 mg total) by mouth every evening, Disp: 30 tablet, Rfl: 5  •  furosemide (LASIX) 40 mg tablet, Take 1 tablet (40 mg total) by mouth daily Do not start before February 1, 2023  (Patient taking differently: Take 20 mg by mouth daily), Disp: 30 tablet, Rfl: 0  •  glucose blood (Contour Next Test) test strip, Use 1 each 3 (three) times a day Use as instructed, Disp: , Rfl:   •  glucose blood (FREESTYLE TEST STRIPS) test strip, May substitute brand based on insurance coverage  Check glucose TID , Disp: 100 each, Rfl: 0  •  glucose monitoring kit (FREESTYLE) monitoring kit, May substitute brand based on insurance coverage  Check glucose TID , Disp: 1 each, Rfl: 0  •  HumaLOG KwikPen 100 units/mL injection pen, INJECT 6 UNITS 3 TIMES A DAY BY SUBCUTANEOUS ROUTE BEFORE MEALS , Disp: 15 mL, Rfl: 3  •  Insulin Pen Needle (BD Pen Needle Megan 2nd Gen) 32G X 4 MM MISC, For use with insulin pen  Pharmacy may dispense brand covered by insurance , Disp: 100 each, Rfl: 0  •  Insulin Pen Needle (BD Pen Needle Megan 2nd Gen) 32G X 4 MM MISC, For use with insulin pen  Pharmacy may dispense brand covered by insurance , Disp: 100 each, Rfl: 0  •  Lantus SoloStar 100 units/mL injection pen, INJECT 15 UNITS UNDER THE SKIN DAILY AT BEDTIME, Disp: 15 mL, Rfl: 3  •  metoprolol succinate (TOPROL-XL) 50 mg 24 hr tablet, Take 1 tablet (50 mg total) by mouth daily In the morning Do not start before February 1, 2023   (Patient taking differently: Take 50 mg by mouth 2 (two) times a day In the morning), Disp: 30 tablet, Rfl: 0  •  Microlet Lancets MISC, USE TO TEST 3 TIMES A DAY, Disp: 100 each, Rfl: 10  •  nitrofurantoin (MACROBID) 100 mg capsule, Take 1 capsule (100 mg total) by mouth 2 (two) times a day for 5 days, Disp: 10 capsule, Rfl: 0  •  nitroglycerin (NITROSTAT) 0 4 mg SL tablet, Place 0 4 mg under the tongue as needed, Disp: , Rfl:   •  potassium chloride (K-DUR,KLOR-CON) 20 mEq tablet, Take 1 tablet (20 mEq total) by mouth daily Do not start before February 1, 2023 , Disp: 14 tablet, Rfl: 0  •  sacubitril-valsartan (Entresto) 49-51 MG TABS, Take 1 tablet by mouth 2 (two) times a day, Disp: , Rfl:   •  Xigduo XR 5-1000 MG TB24, Take 1 tablet by mouth in the morning, Disp: 30 tablet, Rfl: 5  •  metoprolol succinate (TOPROL-XL) 25 mg 24 hr tablet, Take 1 tablet (25 mg total) by mouth daily at bedtime (Patient not taking: Reported on 3/22/2023), Disp: 30 tablet, Rfl: 0      Objective:    Vitals:   /80   Pulse 72   Temp 98 °F (36 7 °C)   Resp 15   Ht 5' 4" (1 626 m)   Wt 73 kg (161 lb)   SpO2 96%   BMI 27 64 kg/m²   Body mass index is 27 64 kg/m²  Vitals:    03/22/23 1717   Weight: 73 kg (161 lb)       Physical Exam  Vitals and nursing note reviewed  Constitutional:       General: She is not in acute distress  Appearance: She is well-developed  She is not ill-appearing, toxic-appearing or diaphoretic  HENT:      Head: Normocephalic and atraumatic  Right Ear: External ear normal       Left Ear: External ear normal       Nose: Nose normal       Mouth/Throat:      Pharynx: No oropharyngeal exudate  Eyes:      General: Lids are normal  Lids are everted, no foreign bodies appreciated  No scleral icterus  Right eye: No discharge  Left eye: No discharge  Conjunctiva/sclera: Conjunctivae normal       Pupils: Pupils are equal, round, and reactive to light  Neck:      Thyroid: No thyromegaly  Vascular: Normal carotid pulses  No carotid bruit, hepatojugular reflux or JVD  Trachea: No tracheal tenderness or tracheal deviation  Cardiovascular:      Rate and Rhythm: Normal rate and regular rhythm  Pulses: Normal pulses  Heart sounds: No murmur heard  No friction rub  Gallop present  Pulmonary:      Effort: Pulmonary effort is normal  No respiratory distress  Breath sounds: No stridor  Rales present  No wheezing     Chest: Chest wall: No tenderness  Abdominal:      General: Bowel sounds are normal  There is no distension  Palpations: Abdomen is soft  There is no mass  Tenderness: There is no abdominal tenderness  There is no guarding or rebound  Musculoskeletal:         General: No tenderness or deformity  Normal range of motion  Cervical back: Normal range of motion and neck supple  No edema, erythema or rigidity  No spinous process tenderness or muscular tenderness  Normal range of motion  Lymphadenopathy:      Head:      Right side of head: No submental, submandibular, tonsillar, preauricular or posterior auricular adenopathy  Left side of head: No submental, submandibular, tonsillar, preauricular, posterior auricular or occipital adenopathy  Cervical: No cervical adenopathy  Right cervical: No superficial, deep or posterior cervical adenopathy  Left cervical: No superficial, deep or posterior cervical adenopathy  Upper Body:      Right upper body: No pectoral adenopathy  Left upper body: No pectoral adenopathy  Skin:     General: Skin is warm and dry  Coloration: Skin is not pale  Findings: No erythema or rash  Neurological:      General: No focal deficit present  Mental Status: She is alert and oriented to person, place, and time  Cranial Nerves: No cranial nerve deficit  Sensory: No sensory deficit  Motor: No tremor, abnormal muscle tone or seizure activity  Coordination: Coordination normal       Gait: Gait normal       Deep Tendon Reflexes: Reflexes are normal and symmetric  Reflexes normal    Psychiatric:         Behavior: Behavior normal          Thought Content: Thought content normal          Judgment: Judgment normal          Lab Review   No results displayed because visit has over 200 results              Patient Instructions     Heart Failure   AMBULATORY CARE:   Heart failure  is a condition that does not allow your heart to fill or pump properly  Not enough oxygen in your blood gets to your organs and tissues  Fluid may not move through your body properly  Fluid may build up and cause swelling and trouble breathing  This is known as congestive heart failure  It is important to manage your health to improve your quality of life  Signs and symptoms  depend on the type of heart failure you have and how severe it is  You may have any of the following:  • Trouble breathing with activity that worsens to trouble breathing at rest    • Shortness of breath while lying flat    • Severe shortness of breath and coughing at night that usually wakes you    • Feeling lightheaded when you stand up    • Purple color around your mouth and nails    • Confusion or anxiety    • Chest pain at night    • Periods of no breathing, then breathing fast    • Lack of energy (often worsened by physical activity), or trouble sleeping    • Swelling in your ankles, legs, or abdomen    • Heartbeat that is fast or not regular    • Fingers and toes feel cool to the touch    Call your local emergency number (911 in the [address] [facility]) if:   1  You have any of the following signs of a heart attack:      1  Squeezing, pressure, or pain in your chest    2  You may  also have any of the following:     - Discomfort or pain in your back, neck, jaw, stomach, or arm    - Shortness of breath    - Nausea or vomiting    - Lightheadedness or a sudden cold sweat      Seek immediate care if:   • Your heartbeat is fast, slow, or uneven all the time  Call your doctor if:   1  You have symptoms of worsening heart failure:      ? Shortness of breath at rest, at night, or that is getting worse in any way    ? Weight gain of 3 or more pounds (1 4 kg) in a day, or more than your healthcare provider says is okay    ? More swelling in your legs or ankles    ? Abdominal pain or swelling    ? More coughing    ? Loss of appetite    ? Feeling tired all the time    2   You feel depressed, or you have lost interest in things you used to enjoy  3  You often feel worried or afraid  4  You have questions or concerns about your condition or care  Treatment:  Heart failure is often caused by damage or injury to your heart  The damage may be caused by other heart problems, diabetes, or high blood pressure  The damage may have also been caused by an infection  Your healthcare providers will help you manage any other health conditions that may be causing your heart failure  The goals of treatment are to manage, slow, or reverse heart damage  Treatment may include any of the following:  • Medicines  may be needed to help regulate your heart rhythm  You may also need medicines to lower your blood pressure, and to decrease extra fluids  • Oxygen  may help you breathe easier if your oxygen level is lower than normal  A CPAP machine may be used to keep your airway open while you sleep  • Cardiac rehab  is a program run by specialists who will help you safely strengthen your heart  In the program you will learn about exercise, relaxation, stress management, and heart-healthy nutrition  Cardiac rehab may be recommended if your heart failure is not severe  • Surgery  can be done to implant a pacemaker or another device in your chest to regulate your heart rhythm  Other types of surgery can open blocked heart vessels, replace a damaged heart valve, or remove scar tissue  Manage swelling from extra fluid:   • Elevate (raise) your legs above the level of your heart  This will help with fluid that builds up in your legs or ankles  Elevate your legs as often as possible during the day  Prop your legs on pillows or blankets to keep them elevated comfortably  Try not to stand for long periods of time during the day  Move around to keep your blood circulating  • Limit sodium (salt)  Ask how much sodium you can have each day   Your healthcare provider may give you a limit, such as 2,300 milligrams (mg) a day  Your provider or a dietitian can teach you how to read food labels for the number of mg in a food  He or she can also help you find ways to have less salt  For example, if you add salt to food as you cook, do not add more at the table  • Drink liquids as directed  You may need to limit the amount of liquid you drink within 24 hours  Your healthcare provider will tell you how much liquid to have and which liquids are best for you  He or she may tell you to limit liquid to 1 5 to 2 liters in a day  He or she will also tell you how often to drink liquid throughout the day  • Weigh yourself every morning  Use the same scale, in the same spot  Do this after you use the bathroom, but before you eat or drink  Wear the same type of clothing each time  Write down your weight and call your healthcare provider if you have a sudden weight gain  Swelling and weight gain are signs of fluid buildup  Manage heart failure: Your quality of life may improve with treatment and the following:  • Do not smoke  Nicotine and other chemicals in cigarettes and cigars can cause lung and heart damage  Ask your healthcare provider for information if you currently smoke and need help to quit  E-cigarettes or smokeless tobacco still contain nicotine  Talk to your healthcare provider before you use these products  • Do not drink alcohol or use illegal drugs  Alcohol and drugs can increase your risk for high blood pressure, diabetes, and coronary artery disease  • Eat heart-healthy foods  Heart-healthy foods include fruits, vegetables, lean meat (such as beef, chicken, or pork), and low-fat dairy products  Fatty fish such as salmon and tuna are also heart healthy  Other heart-healthy foods include walnuts, whole-grain breads, and legumes such as ferrell beans  Replace butter and margarine with heart-healthy oils such as olive oil or canola oil   Your provider or a dietitian can help you create heart-healthy meal plans          • Manage any chronic health conditions you have  These include high blood pressure, diabetes, obesity, high cholesterol, metabolic syndrome, and COPD  You will have fewer symptoms if you manage these health conditions  Follow your healthcare provider's recommendations and follow up with him or her regularly  • Maintain a healthy weight  Being overweight can increase your risk for high blood pressure, diabetes, and coronary artery disease  These conditions can make your symptoms worse  Ask your healthcare provider what a healthy weight is for you  Ask him or her to help you create a weight loss plan, if needed  • Stay active  Activity can help keep your symptoms from getting worse  Walking is a type of physical activity that helps maintain your strength and improve your mood  Physical activity also helps you manage your weight  Work with your healthcare provider to create an exercise plan that is right for you  • Get vaccines as directed  The flu, COVID-19, and pneumonia can be severe for a person who has heart failure  Vaccines protect you from these infections  Get a flu vaccine every year as soon as it is recommended, usually in September or October  Get a COVID-19 vaccine and booster as directed  You may also need the pneumonia vaccine  Your healthcare provider can tell you if you need other vaccines, and when to get them  Follow up with your doctor or cardiologist within 7 days or as directed: You may need to return for other tests  You may need home health care  A healthcare provider will monitor your vital signs, weight, and make sure your medicines are working  Write down your questions so you remember to ask them during your visits  For support or more information:  Heart failure can be difficult to manage  It may be helpful to talk with others who have heart failure  You may learn how to better manage your condition or get emotional support   For more information:  • [address]  [place]   Phone: [phone]  Web Address: https://www strong com/  org     © Copyright [employer] 2022 Information is for End User's use only and may not be sold, redistributed or otherwise used for commercial purposes  The above information is an  only  It is not intended as medical advice for individual conditions or treatments  Talk to your doctor, nurse or pharmacist before following any medical regimen to see if it is safe and effective for you  Jacqueline Luke MD        "This note has been constructed using a voice recognition system  Therefore there may be syntax, spelling, and/or grammatical errors   Please call if you have any questions  "

## 2023-03-23 NOTE — TELEPHONE ENCOUNTER
Upon review of the In Basket request and the patient's chart, initial outreach has been made via fax to facility  Please see Contacts section for details       Thank you  Leandra Nguyen MA

## 2023-03-23 NOTE — ASSESSMENT & PLAN NOTE
Wt Readings from Last 3 Encounters:   03/22/23 73 kg (161 lb)   02/08/23 73 5 kg (162 lb)   01/31/23 71 8 kg (158 lb 6 4 oz)   3  For now patient seems euvolemic CHF seems to be compensated continue low-salt diet fluid restriction patient recently started on Entresto losartan discontinued on the Lasix and following is the work-up findings and reviewed the note from cardiology as follows    • Echo 5/2021 - LVEF 45-50%, grade 1 DD  • CTA chest: No pulmonary embolus  Extensive septal thickening and groundglass opacity due to interstitial and alveolar edema with trace effusions  • BNP: 342  S/P 20 mg IV Lasix in ED  • ECHO today: EF 40%, moderately reduced systolic function with global longitudinal strain reduced -10%  Grade 1 diastolic dysfunction  There is abnormal septal motion consistent with LBBB  • Net negative urine output and decrease in weight, unclear of accuracy initially on admission  • Cardiology consulted:  ? Recommend furosemide 40 mg p o  daily upon discharge  ?  Recommend close outpatient follow-up with primary cardiologist  • Continue 1800 mL Fluid restriction   • Monitor Daily weights, I&Os

## 2023-03-29 ENCOUNTER — OFFICE VISIT (OUTPATIENT)
Dept: INTERNAL MEDICINE CLINIC | Facility: CLINIC | Age: 63
End: 2023-03-29

## 2023-03-29 VITALS
HEART RATE: 69 BPM | SYSTOLIC BLOOD PRESSURE: 136 MMHG | RESPIRATION RATE: 16 BRPM | DIASTOLIC BLOOD PRESSURE: 84 MMHG | BODY MASS INDEX: 27.9 KG/M2 | WEIGHT: 163.4 LBS | TEMPERATURE: 98 F | OXYGEN SATURATION: 96 % | HEIGHT: 64 IN

## 2023-03-29 DIAGNOSIS — I10 ESSENTIAL HYPERTENSION: ICD-10-CM

## 2023-03-29 DIAGNOSIS — B33.24 VIRAL CARDIOMYOPATHY (HCC): ICD-10-CM

## 2023-03-29 DIAGNOSIS — Z79.4 TYPE 2 DIABETES MELLITUS WITH STAGE 3B CHRONIC KIDNEY DISEASE, WITH LONG-TERM CURRENT USE OF INSULIN (HCC): Primary | ICD-10-CM

## 2023-03-29 DIAGNOSIS — E11.22 TYPE 2 DIABETES MELLITUS WITH STAGE 3B CHRONIC KIDNEY DISEASE, WITH LONG-TERM CURRENT USE OF INSULIN (HCC): Primary | ICD-10-CM

## 2023-03-29 DIAGNOSIS — N18.32 TYPE 2 DIABETES MELLITUS WITH STAGE 3B CHRONIC KIDNEY DISEASE, WITH LONG-TERM CURRENT USE OF INSULIN (HCC): Primary | ICD-10-CM

## 2023-03-29 DIAGNOSIS — I47.29 NONSUSTAINED PAROXYSMAL VENTRICULAR TACHYCARDIA (HCC): ICD-10-CM

## 2023-03-29 DIAGNOSIS — I47.20 VENTRICULAR TACHYCARDIA (HCC): ICD-10-CM

## 2023-03-29 DIAGNOSIS — I50.43 ACUTE ON CHRONIC COMBINED SYSTOLIC AND DIASTOLIC CHF (CONGESTIVE HEART FAILURE) (HCC): ICD-10-CM

## 2023-03-29 DIAGNOSIS — I50.43 ACUTE ON CHRONIC COMBINED SYSTOLIC AND DIASTOLIC CONGESTIVE HEART FAILURE (HCC): ICD-10-CM

## 2023-03-29 NOTE — PROGRESS NOTES
BMI Counseling: Body mass index is 28 05 kg/m²  The BMI is above normal  Nutrition recommendations include decreasing portion sizes, encouraging healthy choices of fruits and vegetables, decreasing fast food intake, consuming healthier snacks, limiting drinks that contain sugar, moderation in carbohydrate intake, increasing intake of lean protein, reducing intake of saturated and trans fat and reducing intake of cholesterol  Exercise recommendations include moderate physical activity 150 minutes/week and exercising 3-5 times per week  No pharmacotherapy was ordered  Rationale for BMI follow-up plan is due to patient being overweight or obese

## 2023-03-29 NOTE — PATIENT INSTRUCTIONS
Heart Failure   AMBULATORY CARE:   Heart failure  is a condition that does not allow your heart to fill or pump properly  Not enough oxygen in your blood gets to your organs and tissues  Fluid may not move through your body properly  Fluid may build up and cause swelling and trouble breathing  This is known as congestive heart failure  It is important to manage your health to improve your quality of life  Signs and symptoms  depend on the type of heart failure you have and how severe it is  You may have any of the following:  Trouble breathing with activity that worsens to trouble breathing at rest    Shortness of breath while lying flat    Severe shortness of breath and coughing at night that usually wakes you    Feeling lightheaded when you stand up    Purple color around your mouth and nails    Confusion or anxiety    Chest pain at night    Periods of no breathing, then breathing fast    Lack of energy (often worsened by physical activity), or trouble sleeping    Swelling in your ankles, legs, or abdomen    Heartbeat that is fast or not regular    Fingers and toes feel cool to the touch    Call your local emergency number (911 in the [address] [facility]) if:   You have any of the following signs of a heart attack:      Squeezing, pressure, or pain in your chest    You may  also have any of the following:     Discomfort or pain in your back, neck, jaw, stomach, or arm    Shortness of breath    Nausea or vomiting    Lightheadedness or a sudden cold sweat      Seek immediate care if:   Your heartbeat is fast, slow, or uneven all the time        Call your doctor if:   You have symptoms of worsening heart failure:      Shortness of breath at rest, at night, or that is getting worse in any way    Weight gain of 3 or more pounds (1 4 kg) in a day, or more than your healthcare provider says is okay    More swelling in your legs or ankles    Abdominal pain or swelling    More coughing    Loss of appetite    Feeling tired all the time    You feel depressed, or you have lost interest in things you used to enjoy  You often feel worried or afraid  You have questions or concerns about your condition or care  Treatment:  Heart failure is often caused by damage or injury to your heart  The damage may be caused by other heart problems, diabetes, or high blood pressure  The damage may have also been caused by an infection  Your healthcare providers will help you manage any other health conditions that may be causing your heart failure  The goals of treatment are to manage, slow, or reverse heart damage  Treatment may include any of the following:  Medicines  may be needed to help regulate your heart rhythm  You may also need medicines to lower your blood pressure, and to decrease extra fluids  Oxygen  may help you breathe easier if your oxygen level is lower than normal  A CPAP machine may be used to keep your airway open while you sleep  Cardiac rehab  is a program run by specialists who will help you safely strengthen your heart  In the program you will learn about exercise, relaxation, stress management, and heart-healthy nutrition  Cardiac rehab may be recommended if your heart failure is not severe  Surgery  can be done to implant a pacemaker or another device in your chest to regulate your heart rhythm  Other types of surgery can open blocked heart vessels, replace a damaged heart valve, or remove scar tissue  Manage swelling from extra fluid:   Elevate (raise) your legs above the level of your heart  This will help with fluid that builds up in your legs or ankles  Elevate your legs as often as possible during the day  Prop your legs on pillows or blankets to keep them elevated comfortably  Try not to stand for long periods of time during the day  Move around to keep your blood circulating  Limit sodium (salt)  Ask how much sodium you can have each day   Your healthcare provider may give you a limit, such as 2,300 milligrams (mg) a day  Your provider or a dietitian can teach you how to read food labels for the number of mg in a food  He or she can also help you find ways to have less salt  For example, if you add salt to food as you cook, do not add more at the table  Drink liquids as directed  You may need to limit the amount of liquid you drink within 24 hours  Your healthcare provider will tell you how much liquid to have and which liquids are best for you  He or she may tell you to limit liquid to 1 5 to 2 liters in a day  He or she will also tell you how often to drink liquid throughout the day  Weigh yourself every morning  Use the same scale, in the same spot  Do this after you use the bathroom, but before you eat or drink  Wear the same type of clothing each time  Write down your weight and call your healthcare provider if you have a sudden weight gain  Swelling and weight gain are signs of fluid buildup  Manage heart failure: Your quality of life may improve with treatment and the following:  Do not smoke  Nicotine and other chemicals in cigarettes and cigars can cause lung and heart damage  Ask your healthcare provider for information if you currently smoke and need help to quit  E-cigarettes or smokeless tobacco still contain nicotine  Talk to your healthcare provider before you use these products  Do not drink alcohol or use illegal drugs  Alcohol and drugs can increase your risk for high blood pressure, diabetes, and coronary artery disease  Eat heart-healthy foods  Heart-healthy foods include fruits, vegetables, lean meat (such as beef, chicken, or pork), and low-fat dairy products  Fatty fish such as salmon and tuna are also heart healthy  Other heart-healthy foods include walnuts, whole-grain breads, and legumes such as ferrell beans  Replace butter and margarine with heart-healthy oils such as olive oil or canola oil   Your provider or a dietitian can help you create heart-healthy meal plans  Manage any chronic health conditions you have  These include high blood pressure, diabetes, obesity, high cholesterol, metabolic syndrome, and COPD  You will have fewer symptoms if you manage these health conditions  Follow your healthcare provider's recommendations and follow up with him or her regularly  Maintain a healthy weight  Being overweight can increase your risk for high blood pressure, diabetes, and coronary artery disease  These conditions can make your symptoms worse  Ask your healthcare provider what a healthy weight is for you  Ask him or her to help you create a weight loss plan, if needed  Stay active  Activity can help keep your symptoms from getting worse  Walking is a type of physical activity that helps maintain your strength and improve your mood  Physical activity also helps you manage your weight  Work with your healthcare provider to create an exercise plan that is right for you  Get vaccines as directed  The flu, COVID-19, and pneumonia can be severe for a person who has heart failure  Vaccines protect you from these infections  Get a flu vaccine every year as soon as it is recommended, usually in September or October  Get a COVID-19 vaccine and booster as directed  You may also need the pneumonia vaccine  Your healthcare provider can tell you if you need other vaccines, and when to get them  Follow up with your doctor or cardiologist within 7 days or as directed: You may need to return for other tests  You may need home health care  A healthcare provider will monitor your vital signs, weight, and make sure your medicines are working  Write down your questions so you remember to ask them during your visits  For support or more information:  Heart failure can be difficult to manage  It may be helpful to talk with others who have heart failure  You may learn how to better manage your condition or get emotional support   For more information:  [address]  [place]   Phone: [phone] [phone]  Web Address: https://www strong com/  [employer]     © Copyright [employer] 2022 Information is for End User's use only and may not be sold, redistributed or otherwise used for commercial purposes  The above information is an  only  It is not intended as medical advice for individual conditions or treatments  Talk to your doctor, nurse or pharmacist before following any medical regimen to see if it is safe and effective for you

## 2023-03-29 NOTE — ASSESSMENT & PLAN NOTE
Patient ventricular tachycardia during hospitalization nonsustained paroxysmal and patient finished the 2 weeks outpatient cardiac monitoring awaiting results if needed consider AICD awaiting to be seen by cardiology

## 2023-03-29 NOTE — TELEPHONE ENCOUNTER
As a follow-up, a second attempt has been made for outreach via fax to facility  Please see Contacts section for details      Thank you  Waldemar Aguilar MA

## 2023-03-29 NOTE — ASSESSMENT & PLAN NOTE
Profound weakness tired fatigue difficulty breathing on mild exertion followed by cardiology continue current treatment ejection fraction about 45% for now euvolemic CHF compensated

## 2023-03-29 NOTE — ASSESSMENT & PLAN NOTE
Wt Readings from Last 3 Encounters:   03/29/23 74 1 kg (163 lb 6 4 oz)   03/22/23 73 kg (161 lb)   02/08/23 73 5 kg (162 lb)     Almost resolved but still have some difficulty breathing on exertion mild the last echocardiogram done ejection fraction 45% at present looks euvolemic CHF seems to be compensated on Lasix 20 mg daily metoprolol 50 twice a day also diastolic dysfunction continue salt restriction fluid restriction and daily weight monitoring

## 2023-03-30 NOTE — ASSESSMENT & PLAN NOTE
Wt Readings from Last 3 Encounters:   03/29/23 74 1 kg (163 lb 6 4 oz)   03/22/23 73 kg (161 lb)   02/08/23 73 5 kg (162 lb)   Patient CHF acute CHF resolved for now looks euvolemic CHF seems to be compensated continue fluid restriction low-salt diet Daily weight monitoring awaiting to be seen by cardiology and following is the plan follow-up test results and findings as follows reviewed from a previous follow-up for the cardiology note  • Echo 5/2021 - LVEF 45-50%, grade 1 DD  • CTA chest: No pulmonary embolus  Extensive septal thickening and groundglass opacity due to interstitial and alveolar edema with trace effusions  • BNP: 342  S/P 20 mg IV Lasix in ED  • ECHO : EF 40%, moderately reduced systolic function with global longitudinal strain reduced -10%  Grade 1 diastolic dysfunction  There is abnormal septal motion consistent with LBBB  • Net negative urine output and decrease in weight, unclear of accuracy initially on admission  • Cardiology consulted:  ? Recommend furosemide 40 mg p o  daily continue  ?  Recommend close outpatient follow-up with primary cardiologist  • Continue 1800 mL Fluid restriction   Monitor Daily weights, I&Os

## 2023-03-30 NOTE — PROGRESS NOTES
Dr Perla Fairchild Office Visit Note  23     Marce Solis 61 y o  female MRN: [id number]  : 1960    Assessment:     1  Type 2 diabetes mellitus with stage 3b chronic kidney disease, with long-term current use of insulin ([facility])  Assessment & Plan:    Lab Results   Component Value Date    HGBA1C 7 1 (H) 2022   Last hemoglobin A1c done at [facility] 7 5 reviewed blood sugar control base of A1c continue home blood sugar monitoring diabetic diet hypoglycemia protocol in place blood work done in February at [facility] GFR was 43 latest lab GFR improved to 71 creatinine normal hypoglycemia protocol in place advised dilated eye exam once a year foot exam normal continue current regimen Lantus and Xigduo and Humalog with each meal      2  Acute on chronic combined systolic and diastolic CHF (congestive heart failure) ([facility])  Assessment & Plan:  Wt Readings from Last 3 Encounters:   23 74 1 kg (163 lb 6 4 oz)   23 73 kg (161 lb)   23 73 5 kg (162 lb)     Almost resolved but still have some difficulty breathing on exertion mild the last echocardiogram done ejection fraction 45% at present looks euvolemic CHF seems to be compensated on Lasix 20 mg daily metoprolol 50 twice a day also diastolic dysfunction continue salt restriction fluid restriction and daily weight monitoring          3  Essential hypertension  Assessment & Plan:  Blood pressure control continue low-salt diet metoprolol and Lasix      4  Ventricular tachycardia  Assessment & Plan:  Patient ventricular tachycardia during hospitalization nonsustained paroxysmal and patient finished the 2 weeks outpatient cardiac monitoring awaiting results if needed consider AICD awaiting to be seen by cardiology      5  Nonsustained paroxysmal ventricular tachycardia  Assessment & Plan: The work-up finding follow-up plan same as under ventricular tachycardia and this visit diagnosis      6   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([facility] [address] )  Assessment & Plan:  Profound weakness tired fatigue difficulty breathing on mild exertion followed by cardiology continue current treatment ejection fraction about 45% for now euvolemic CHF compensated      7  Acute on chronic combined systolic and diastolic congestive heart failure (HCC)  Assessment & Plan:  Wt Readings from Last 3 Encounters:   03/29/23 74 1 kg (163 lb 6 4 oz)   03/22/23 73 kg (161 lb)   02/08/23 73 5 kg (162 lb)   Patient CHF acute CHF resolved for now looks euvolemic CHF seems to be compensated continue fluid restriction low-salt diet Daily weight monitoring awaiting to be seen by cardiology and following is the plan follow-up test results and findings as follows reviewed from a previous follow-up for the cardiology note  Echo 5/2021 - LVEF 45-50%, grade 1 DD  CTA chest: No pulmonary embolus  Extensive septal thickening and groundglass opacity due to interstitial and alveolar edema with trace effusions  BNP: 342  S/P 20 mg IV Lasix in ED   ECHO : EF 40%, moderately reduced systolic function with global longitudinal strain reduced -10%  Grade 1 diastolic dysfunction  There is abnormal septal motion consistent with LBBB  Net negative urine output and decrease in weight, unclear of accuracy initially on admission  Cardiology consulted:  Recommend furosemide 40 mg p o  daily continue  Recommend close outpatient follow-up with primary cardiologist  Continue 1800 mL Fluid restriction   Monitor Daily weights, I&Os                  Discussion Summary and Plan: Today's care plan and medications were reviewed with patient in detail and all their questions answered to their satisfaction  Chief Complaint   Patient presents with   • Follow-up     Lab work was done        Subjective:  Came in for follow-up chronic medical condition especially chronic systolic diastolic CHF patient's acute CHF resolved still feeling weak tired fatigue difficulty breathing on moderate exertion finish the cardiac monitoring for 2 weeks for ventricular tachycardia awaiting to be seen by cardiology to make a decision about AICD patient was started on Entresto and since then feeling weak tired fatigue the last blood work done at [facility] showed the GFR down to 46 now repeat blood work done about a week ago shows GFR is back to 71 creatinine normal improved      The following portions of the patient's history were reviewed and updated as appropriate: allergies, current medications, past family history, past medical history, past social history, past surgical history and problem list     Review of Systems   Constitutional: Positive for fatigue  Negative for activity change, appetite change, chills, diaphoresis, fever and unexpected weight change  HENT: Negative for congestion, dental problem, drooling, ear discharge, ear pain, facial swelling, hearing loss, mouth sores, nosebleeds, postnasal drip, rhinorrhea, sinus pressure, sneezing, sore throat, tinnitus, trouble swallowing and voice change  Eyes: Negative for photophobia, pain, discharge, redness, itching and visual disturbance  Respiratory: Positive for shortness of breath  Negative for apnea, cough, choking, chest tightness, wheezing and stridor  Cardiovascular: Positive for palpitations  Negative for chest pain and leg swelling  Gastrointestinal: Negative for abdominal distention, abdominal pain, anal bleeding, blood in stool, constipation, diarrhea, nausea, rectal pain and vomiting  Endocrine: Negative for cold intolerance, heat intolerance, polydipsia, polyphagia and polyuria  Genitourinary: Negative for decreased urine volume, difficulty urinating, dysuria, enuresis, flank pain, frequency, genital sores, hematuria and urgency  Musculoskeletal: Positive for arthralgias  Negative for back pain, gait problem, joint swelling, myalgias, neck pain and neck stiffness  Skin: Negative for color change, pallor, rash and wound  Allergic/Immunologic: Negative    Negative for environmental allergies, food allergies and immunocompromised state  Neurological: Negative for dizziness, tremors, seizures, syncope, facial asymmetry, speech difficulty, weakness, light-headedness, numbness and headaches  Psychiatric/Behavioral: Negative for agitation, behavioral problems, confusion, decreased concentration, dysphoric mood, hallucinations, self-injury, sleep disturbance and suicidal ideas  The patient is not nervous/anxious and is not hyperactive  Historical Information   Patient Active Problem List   Diagnosis   • Acquired deformity of foot   • Pain in both feet   • Congenital pes planus of right foot   • Congenital pes planus of left foot   • Hammer toe of left foot   • Harvey's neuroma of second interspace of right foot   • Right foot pain   • Hammer toe of right foot   • Word finding difficulty   • Hypertensive urgency   • Hyponatremia in the setting of hyperglycemia   • Prolonged QT interval   • HLD (hyperlipidemia)   • Nonsustained paroxysmal ventricular tachycardia   • Essential hypertension   • Blurry vision   • Diarrhea   • Type 2 diabetes mellitus treated with insulin ([place] [address] )   • Overweight (BMI 25 0-29  9)   • 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([place] [address] )   • Acute on chronic combined systolic and diastolic congestive heart failure (HCC)   • Prinzmetal's angina ([facility])   • Deep vein thrombosis (DVT) of left lower extremity ([facility])   • Chest pain   • Acute respiratory failure with hypoxia ([facility])   • Acute on chronic combined systolic and diastolic CHF (congestive heart failure) ([facility])   • Anemia, unspecified type   • Ventricular tachycardia   • Stage 3b chronic kidney disease ([facility])   • Type 2 diabetes mellitus with stage 3b chronic kidney disease ([facility])     Past Medical History:   Diagnosis Date   • Abdominal pain    • Anxiety disorder    • Diabetes mellitus ([place] [address] )    • Dizziness    • Heart failure ([facility])    • High cholesterol    • Hypertension    • Lightheadedness    • Palpitations    • SOB (shortness of breath)      History reviewed  No pertinent surgical history  Social History     Substance and Sexual Activity   Alcohol Use Yes    Comment: social     Social History     Substance and Sexual Activity   Drug Use Never     Social History     Tobacco Use   Smoking Status Former   Smokeless Tobacco Former     History reviewed  No pertinent family history  Health Maintenance Due   Topic   • Hepatitis C Screening    • COVID-19 Vaccine (1)   • Pneumococcal Vaccine: Pediatrics (0 to 5 Years) and At-Risk Patients (6 to 59 Years) (1 - PCV)   • DM Eye Exam    • HIV Screening    • Annual Physical    • DTaP,Tdap,and Td Vaccines (1 - Tdap)   • Cervical Cancer Screening    • Breast Cancer Screening: Mammogram    • Colorectal Cancer Screening    • Influenza Vaccine (1)   • Kidney Health Evaluation: Microalbumin/Creatinine Ratio    • HEMOGLOBIN A1C    • Depression Screening       Meds/Allergies       Current Outpatient Medications:   •  Alcohol Swabs 70 % PADS, May substitute brand based on insurance coverage  Check glucose TID , Disp: 100 each, Rfl: 0  •  ALPRAZolam (XANAX) 0 5 mg tablet, Take 1 tablet (0 5 mg total) by mouth daily as needed for anxiety, Disp: 30 tablet, Rfl: 1  •  aspirin 81 MG tablet, Take 81 mg by mouth daily, Disp: , Rfl:   •  atorvastatin (LIPITOR) 80 mg tablet, Take 1 tablet (80 mg total) by mouth every evening, Disp: 30 tablet, Rfl: 5  •  furosemide (LASIX) 40 mg tablet, Take 1 tablet (40 mg total) by mouth daily Do not start before February 1, 2023  (Patient taking differently: Take 20 mg by mouth daily), Disp: 30 tablet, Rfl: 0  •  glucose blood (Contour Next Test) test strip, Use 1 each 3 (three) times a day Use as instructed, Disp: , Rfl:   •  glucose blood (FREESTYLE TEST STRIPS) test strip, May substitute brand based on insurance coverage  Check glucose TID , Disp: 100 each, Rfl: 0  •  glucose monitoring kit (FREESTYLE) monitoring kit, May substitute brand based on insurance coverage   Check glucose "TID , Disp: 1 each, Rfl: 0  •  HumaLOG KwikPen 100 units/mL injection pen, INJECT 6 UNITS 3 TIMES A DAY BY SUBCUTANEOUS ROUTE BEFORE MEALS , Disp: 15 mL, Rfl: 3  •  Insulin Pen Needle (BD Pen Needle Megan 2nd Gen) 32G X 4 MM MISC, For use with insulin pen  Pharmacy may dispense brand covered by insurance , Disp: 100 each, Rfl: 0  •  Insulin Pen Needle (BD Pen Needle Megan 2nd Gen) 32G X 4 MM MISC, For use with insulin pen  Pharmacy may dispense brand covered by insurance , Disp: 100 each, Rfl: 0  •  Lantus SoloStar 100 units/mL injection pen, INJECT 15 UNITS UNDER THE SKIN DAILY AT BEDTIME, Disp: 15 mL, Rfl: 3  •  metoprolol succinate (TOPROL-XL) 50 mg 24 hr tablet, Take 1 tablet (50 mg total) by mouth daily In the morning Do not start before February 1, 2023  (Patient taking differently: Take 50 mg by mouth 2 (two) times a day In the morning), Disp: 30 tablet, Rfl: 0  •  Microlet Lancets MISC, USE TO TEST 3 TIMES A DAY, Disp: 100 each, Rfl: 10  •  nitroglycerin (NITROSTAT) 0 4 mg SL tablet, Place 0 4 mg under the tongue as needed, Disp: , Rfl:   •  potassium chloride (K-DUR,KLOR-CON) 20 mEq tablet, Take 1 tablet (20 mEq total) by mouth daily Do not start before February 1, 2023 , Disp: 14 tablet, Rfl: 0  •  sacubitril-valsartan (Entresto) 49-51 MG TABS, Take 1 tablet by mouth 2 (two) times a day, Disp: , Rfl:   •  Xigduo XR 5-1000 MG TB24, Take 1 tablet by mouth in the morning, Disp: 30 tablet, Rfl: 5      Objective:    Vitals:   /84   Pulse 69   Temp 98 °F (36 7 °C)   Resp 16   Ht 5' 4\" (1 626 m)   Wt 74 1 kg (163 lb 6 4 oz)   SpO2 96%   BMI 28 05 kg/m²   Body mass index is 28 05 kg/m²  Vitals:    03/29/23 1647   Weight: 74 1 kg (163 lb 6 4 oz)       Physical Exam  Constitutional:       General: She is not in acute distress  Appearance: She is well-developed  She is not ill-appearing, toxic-appearing or diaphoretic  HENT:      Head: Normocephalic and atraumatic        Right Ear: External ear " normal       Left Ear: External ear normal       Nose: Nose normal       Mouth/Throat:      Pharynx: No oropharyngeal exudate  Eyes:      General: Lids are normal  Lids are everted, no foreign bodies appreciated  No scleral icterus  Right eye: No discharge  Left eye: No discharge  Conjunctiva/sclera: Conjunctivae normal       Pupils: Pupils are equal, round, and reactive to light  Neck:      Thyroid: No thyromegaly  Vascular: Normal carotid pulses  No carotid bruit, hepatojugular reflux or JVD  Trachea: No tracheal tenderness or tracheal deviation  Cardiovascular:      Rate and Rhythm: Normal rate and regular rhythm  Pulses: Normal pulses  Heart sounds: Normal heart sounds  No murmur heard  No friction rub  No gallop  Pulmonary:      Effort: Pulmonary effort is normal  No respiratory distress  Breath sounds: No stridor  Rales present  No wheezing  Chest:      Chest wall: No tenderness  Abdominal:      General: Bowel sounds are normal  There is no distension  Palpations: Abdomen is soft  There is no mass  Tenderness: There is no abdominal tenderness  There is no guarding or rebound  Musculoskeletal:         General: No tenderness or deformity  Normal range of motion  Cervical back: Normal range of motion and neck supple  No edema, erythema or rigidity  No spinous process tenderness or muscular tenderness  Normal range of motion  Right lower leg: Edema present  Left lower leg: Edema present  Lymphadenopathy:      Head:      Right side of head: No submental, submandibular, tonsillar, preauricular or posterior auricular adenopathy  Left side of head: No submental, submandibular, tonsillar, preauricular, posterior auricular or occipital adenopathy  Cervical: No cervical adenopathy  Right cervical: No superficial, deep or posterior cervical adenopathy       Left cervical: No superficial, deep or posterior cervical adenopathy  Upper Body:      Right upper body: No pectoral adenopathy  Left upper body: No pectoral adenopathy  Skin:     General: Skin is warm and dry  Coloration: Skin is not pale  Findings: No erythema or rash  Neurological:      General: No focal deficit present  Mental Status: She is alert and oriented to person, place, and time  Cranial Nerves: No cranial nerve deficit  Sensory: No sensory deficit  Motor: Weakness present  No tremor, abnormal muscle tone or seizure activity  Coordination: Coordination normal       Gait: Gait normal       Deep Tendon Reflexes: Reflexes are normal and symmetric  Reflexes normal    Psychiatric:         Behavior: Behavior normal          Thought Content: Thought content normal          Judgment: Judgment normal          Lab Review   No results displayed because visit has over 200 results  Patient Instructions   Heart Failure   AMBULATORY CARE:   Heart failure  is a condition that does not allow your heart to fill or pump properly  Not enough oxygen in your blood gets to your organs and tissues  Fluid may not move through your body properly  Fluid may build up and cause swelling and trouble breathing  This is known as congestive heart failure  It is important to manage your health to improve your quality of life  Signs and symptoms  depend on the type of heart failure you have and how severe it is   You may have any of the following:  Trouble breathing with activity that worsens to trouble breathing at rest    Shortness of breath while lying flat    Severe shortness of breath and coughing at night that usually wakes you    Feeling lightheaded when you stand up    Purple color around your mouth and nails    Confusion or anxiety    Chest pain at night    Periods of no breathing, then breathing fast    Lack of energy (often worsened by physical activity), or trouble sleeping    Swelling in your ankles, legs, or abdomen    Heartbeat that is fast or not regular    Fingers and toes feel cool to the touch    Call your local emergency number (911 in the [address]) if:   You have any of the following signs of a heart attack:      Squeezing, pressure, or pain in your chest    You may  also have any of the following:     Discomfort or pain in your back, neck, jaw, stomach, or arm    Shortness of breath    Nausea or vomiting    Lightheadedness or a sudden cold sweat      Seek immediate care if:   Your heartbeat is fast, slow, or uneven all the time  Call your doctor if:   You have symptoms of worsening heart failure:      Shortness of breath at rest, at night, or that is getting worse in any way    Weight gain of 3 or more pounds (1 4 kg) in a day, or more than your healthcare provider says is okay    More swelling in your legs or ankles    Abdominal pain or swelling    More coughing    Loss of appetite    Feeling tired all the time    You feel depressed, or you have lost interest in things you used to enjoy  You often feel worried or afraid  You have questions or concerns about your condition or care  Treatment:  Heart failure is often caused by damage or injury to your heart  The damage may be caused by other heart problems, diabetes, or high blood pressure  The damage may have also been caused by an infection  Your healthcare providers will help you manage any other health conditions that may be causing your heart failure  The goals of treatment are to manage, slow, or reverse heart damage  Treatment may include any of the following:  Medicines  may be needed to help regulate your heart rhythm  You may also need medicines to lower your blood pressure, and to decrease extra fluids  Oxygen  may help you breathe easier if your oxygen level is lower than normal  A CPAP machine may be used to keep your airway open while you sleep           Cardiac rehab  is a program run by specialists who will help you safely strengthen your heart  In the program you will learn about exercise, relaxation, stress management, and heart-healthy nutrition  Cardiac rehab may be recommended if your heart failure is not severe  Surgery  can be done to implant a pacemaker or another device in your chest to regulate your heart rhythm  Other types of surgery can open blocked heart vessels, replace a damaged heart valve, or remove scar tissue  Manage swelling from extra fluid:   Elevate (raise) your legs above the level of your heart  This will help with fluid that builds up in your legs or ankles  Elevate your legs as often as possible during the day  Prop your legs on pillows or blankets to keep them elevated comfortably  Try not to stand for long periods of time during the day  Move around to keep your blood circulating  Limit sodium (salt)  Ask how much sodium you can have each day  Your healthcare provider may give you a limit, such as 2,300 milligrams (mg) a day  Your provider or a dietitian can teach you how to read food labels for the number of mg in a food  He or she can also help you find ways to have less salt  For example, if you add salt to food as you cook, do not add more at the table  Drink liquids as directed  You may need to limit the amount of liquid you drink within 24 hours  Your healthcare provider will tell you how much liquid to have and which liquids are best for you  He or she may tell you to limit liquid to 1 5 to 2 liters in a day  He or she will also tell you how often to drink liquid throughout the day  Weigh yourself every morning  Use the same scale, in the same spot  Do this after you use the bathroom, but before you eat or drink  Wear the same type of clothing each time  Write down your weight and call your healthcare provider if you have a sudden weight gain  Swelling and weight gain are signs of fluid buildup  Manage heart failure:   Your quality of life may improve with treatment and the following:  Do not smoke  Nicotine and other chemicals in cigarettes and cigars can cause lung and heart damage  Ask your healthcare provider for information if you currently smoke and need help to quit  E-cigarettes or smokeless tobacco still contain nicotine  Talk to your healthcare provider before you use these products  Do not drink alcohol or use illegal drugs  Alcohol and drugs can increase your risk for high blood pressure, diabetes, and coronary artery disease  Eat heart-healthy foods  Heart-healthy foods include fruits, vegetables, lean meat (such as beef, chicken, or pork), and low-fat dairy products  Fatty fish such as salmon and tuna are also heart healthy  Other heart-healthy foods include walnuts, whole-grain breads, and legumes such as ferrell beans  Replace butter and margarine with heart-healthy oils such as olive oil or canola oil  Your provider or a dietitian can help you create heart-healthy meal plans  Manage any chronic health conditions you have  These include high blood pressure, diabetes, obesity, high cholesterol, metabolic syndrome, and COPD  You will have fewer symptoms if you manage these health conditions  Follow your healthcare provider's recommendations and follow up with him or her regularly  Maintain a healthy weight  Being overweight can increase your risk for high blood pressure, diabetes, and coronary artery disease  These conditions can make your symptoms worse  Ask your healthcare provider what a healthy weight is for you  Ask him or her to help you create a weight loss plan, if needed  Stay active  Activity can help keep your symptoms from getting worse  Walking is a type of physical activity that helps maintain your strength and improve your mood  Physical activity also helps you manage your weight  Work with your healthcare provider to create an exercise plan that is right for you  Get vaccines as directed    The flu, COVID-19, and pneumonia "can be severe for a person who has heart failure  Vaccines protect you from these infections  Get a flu vaccine every year as soon as it is recommended, usually in September or October  Get a COVID-19 vaccine and booster as directed  You may also need the pneumonia vaccine  Your healthcare provider can tell you if you need other vaccines, and when to get them  Follow up with your doctor or cardiologist within 7 days or as directed: You may need to return for other tests  You may need home health care  A healthcare provider will monitor your vital signs, weight, and make sure your medicines are working  Write down your questions so you remember to ask them during your visits  For support or more information:  Heart failure can be difficult to manage  It may be helpful to talk with others who have heart failure  You may learn how to better manage your condition or get emotional support  For more information:  [address] [place] [address]   Phone: 6- 186 - 987-3308  Web Address: https://www [employer]/  org     © Copyright [employer]ad[employer] 2022 Information is for End User's use only and may not be sold, redistributed or otherwise used for commercial purposes  The above information is an  only  It is not intended as medical advice for individual conditions or treatments  Talk to your doctor, nurse or pharmacist before following any medical regimen to see if it is safe and effective for you  Dmitry Umana MD        \"This note has been constructed using a voice recognition system  Therefore there may be syntax, spelling, and/or grammatical errors  Please call if you have any questions   \"  "

## 2023-04-03 NOTE — TELEPHONE ENCOUNTER
As a final attempt, a third outreach has been made via telephone call to facility  Please see Contacts section for details  This encounter will be closed and completed by end of day  Should we receive the requested information because of previous outreach attempts, the requested patient's chart will be updated appropriately       Thank you  Angel Vega MA

## 2023-05-31 ENCOUNTER — OFFICE VISIT (OUTPATIENT)
Dept: INTERNAL MEDICINE CLINIC | Facility: CLINIC | Age: 63
End: 2023-05-31

## 2023-05-31 VITALS
HEART RATE: 83 BPM | DIASTOLIC BLOOD PRESSURE: 82 MMHG | BODY MASS INDEX: 28.48 KG/M2 | RESPIRATION RATE: 15 BRPM | HEIGHT: 64 IN | TEMPERATURE: 98 F | OXYGEN SATURATION: 97 % | SYSTOLIC BLOOD PRESSURE: 140 MMHG | WEIGHT: 166.8 LBS

## 2023-05-31 DIAGNOSIS — F41.9 ANXIETY: ICD-10-CM

## 2023-05-31 DIAGNOSIS — J96.01 ACUTE RESPIRATORY FAILURE WITH HYPOXIA (HCC): ICD-10-CM

## 2023-05-31 DIAGNOSIS — I10 ESSENTIAL HYPERTENSION: ICD-10-CM

## 2023-05-31 DIAGNOSIS — N18.32 STAGE 3B CHRONIC KIDNEY DISEASE (HCC): ICD-10-CM

## 2023-05-31 DIAGNOSIS — E11.65 UNCONTROLLED TYPE 2 DIABETES MELLITUS WITH HYPERGLYCEMIA (HCC): ICD-10-CM

## 2023-05-31 DIAGNOSIS — I50.42 CHRONIC COMBINED SYSTOLIC AND DIASTOLIC CHF (CONGESTIVE HEART FAILURE) (HCC): Primary | ICD-10-CM

## 2023-05-31 DIAGNOSIS — I47.29 NONSUSTAINED PAROXYSMAL VENTRICULAR TACHYCARDIA (HCC): ICD-10-CM

## 2023-05-31 DIAGNOSIS — B33.24 VIRAL CARDIOMYOPATHY (HCC): ICD-10-CM

## 2023-05-31 PROBLEM — I50.32 CHRONIC DIASTOLIC CHF (CONGESTIVE HEART FAILURE) (HCC): Status: ACTIVE | Noted: 2023-02-06

## 2023-05-31 PROBLEM — I42.0 CARDIOMYOPATHY, DILATED (HCC): Status: ACTIVE | Noted: 2023-02-06

## 2023-05-31 RX ORDER — ALPRAZOLAM 0.5 MG/1
0.5 TABLET ORAL DAILY PRN
Qty: 30 TABLET | Refills: 1 | Status: SHIPPED | OUTPATIENT
Start: 2023-05-31

## 2023-05-31 RX ORDER — FUROSEMIDE 40 MG/1
20 TABLET ORAL DAILY
Qty: 30 TABLET | Refills: 0 | Status: SHIPPED | OUTPATIENT
Start: 2023-05-31

## 2023-05-31 NOTE — PATIENT INSTRUCTIONS
Heart Failure   AMBULATORY CARE:   Heart failure  is a condition that does not allow your heart to fill or pump properly  Not enough oxygen in your blood gets to your organs and tissues  Fluid may not move through your body properly  Fluid may build up and cause swelling and trouble breathing  This is known as congestive heart failure  It is important to manage your health to improve your quality of life  Signs and symptoms  depend on the type of heart failure you have and how severe it is  You may have any of the following:  Trouble breathing with activity that worsens to trouble breathing at rest    Shortness of breath while lying flat    Severe shortness of breath and coughing at night that usually wakes you    Feeling lightheaded when you stand up    Purple color around your mouth and nails    Confusion or anxiety    Chest pain at night    Periods of no breathing, then breathing fast    Lack of energy (often worsened by physical activity), or trouble sleeping    Swelling in your ankles, legs, or abdomen    Heartbeat that is fast or not regular    Fingers and toes feel cool to the touch    Call your local emergency number (911 in the [address] [facility]) if:   You have any of the following signs of a heart attack:      Squeezing, pressure, or pain in your chest    You may  also have any of the following:     Discomfort or pain in your back, neck, jaw, stomach, or arm    Shortness of breath    Nausea or vomiting    Lightheadedness or a sudden cold sweat      Seek immediate care if:   Your heartbeat is fast, slow, or uneven all the time        Call your doctor if:   You have symptoms of worsening heart failure:      Shortness of breath at rest, at night, or that is getting worse in any way    Weight gain of 3 or more pounds (1 4 kg) in a day, or more than your healthcare provider says is okay    More swelling in your legs or ankles    Abdominal pain or swelling    More coughing    Loss of appetite    Feeling tired all the time    You feel depressed, or you have lost interest in things you used to enjoy  You often feel worried or afraid  You have questions or concerns about your condition or care  Treatment:  Heart failure is often caused by damage or injury to your heart  The damage may be caused by other heart problems, diabetes, or high blood pressure  The damage may have also been caused by an infection  Your healthcare providers will help you manage any other health conditions that may be causing your heart failure  The goals of treatment are to manage, slow, or reverse heart damage  Treatment may include any of the following:  Medicines  may be needed to help regulate your heart rhythm  You may also need medicines to lower your blood pressure, and to decrease extra fluids  Oxygen  may help you breathe easier if your oxygen level is lower than normal  A CPAP machine may be used to keep your airway open while you sleep  Cardiac rehab  is a program run by specialists who will help you safely strengthen your heart  In the program you will learn about exercise, relaxation, stress management, and heart-healthy nutrition  Cardiac rehab may be recommended if your heart failure is not severe  Surgery  can be done to implant a pacemaker or another device in your chest to regulate your heart rhythm  Other types of surgery can open blocked heart vessels, replace a damaged heart valve, or remove scar tissue  Manage swelling from extra fluid:   Elevate (raise) your legs above the level of your heart  This will help with fluid that builds up in your legs or ankles  Elevate your legs as often as possible during the day  Prop your legs on pillows or blankets to keep them elevated comfortably  Try not to stand for long periods of time during the day  Move around to keep your blood circulating  Limit sodium (salt)  Ask how much sodium you can have each day   Your healthcare provider may give you a limit, such as 2,300 milligrams (mg) a day  Your provider or a dietitian can teach you how to read food labels for the number of mg in a food  He or she can also help you find ways to have less salt  For example, if you add salt to food as you cook, do not add more at the table  Drink liquids as directed  You may need to limit the amount of liquid you drink within 24 hours  Your healthcare provider will tell you how much liquid to have and which liquids are best for you  He or she may tell you to limit liquid to 1 5 to 2 liters in a day  He or she will also tell you how often to drink liquid throughout the day  Weigh yourself every morning  Use the same scale, in the same spot  Do this after you use the bathroom, but before you eat or drink  Wear the same type of clothing each time  Write down your weight and call your healthcare provider if you have a sudden weight gain  Swelling and weight gain are signs of fluid buildup  Manage heart failure: Your quality of life may improve with treatment and the following:  Do not smoke  Nicotine and other chemicals in cigarettes and cigars can cause lung and heart damage  Ask your healthcare provider for information if you currently smoke and need help to quit  E-cigarettes or smokeless tobacco still contain nicotine  Talk to your healthcare provider before you use these products  Do not drink alcohol or use illegal drugs  Alcohol and drugs can increase your risk for high blood pressure, diabetes, and coronary artery disease  Eat heart-healthy foods  Heart-healthy foods include fruits, vegetables, lean meat (such as beef, chicken, or pork), and low-fat dairy products  Fatty fish such as salmon and tuna are also heart healthy  Other heart-healthy foods include walnuts, whole-grain breads, and legumes such as ferrell beans  Replace butter and margarine with heart-healthy oils such as olive oil or canola oil   Your provider or a dietitian can help you create heart-healthy meal plans  Manage any chronic health conditions you have  These include high blood pressure, diabetes, obesity, high cholesterol, metabolic syndrome, and COPD  You will have fewer symptoms if you manage these health conditions  Follow your healthcare provider's recommendations and follow up with him or her regularly  Maintain a healthy weight  Being overweight can increase your risk for high blood pressure, diabetes, and coronary artery disease  These conditions can make your symptoms worse  Ask your healthcare provider what a healthy weight is for you  Ask him or her to help you create a weight loss plan, if needed  Stay active  Activity can help keep your symptoms from getting worse  Walking is a type of physical activity that helps maintain your strength and improve your mood  Physical activity also helps you manage your weight  Work with your healthcare provider to create an exercise plan that is right for you  Get vaccines as directed  The flu, COVID-19, and pneumonia can be severe for a person who has heart failure  Vaccines protect you from these infections  Get a flu vaccine every year as soon as it is recommended, usually in September or October  Get a COVID-19 vaccine and booster as directed  You may also need the pneumonia vaccine  Your healthcare provider can tell you if you need other vaccines, and when to get them  Follow up with your doctor or cardiologist within 7 days or as directed: You may need to return for other tests  You may need home health care  A healthcare provider will monitor your vital signs, weight, and make sure your medicines are working  Write down your questions so you remember to ask them during your visits  For support or more information:  Heart failure can be difficult to manage  It may be helpful to talk with others who have heart failure  You may learn how to better manage your condition or get emotional support   For more information:  [address]  [place]   Phone: [phone]  Web Address: https://www strong com/  org     © Copyright [employer] 2022 Information is for End User's use only and may not be sold, redistributed or otherwise used for commercial purposes  The above information is an  only  It is not intended as medical advice for individual conditions or treatments  Talk to your doctor, nurse or pharmacist before following any medical regimen to see if it is safe and effective for you

## 2023-05-31 NOTE — ASSESSMENT & PLAN NOTE
Wt Readings from Last 3 Encounters:   05/31/23 75 7 kg (166 lb 12 8 oz)   03/29/23 74 1 kg (163 lb 6 4 oz)   03/22/23 73 kg (161 lb)     Patient at present time euvolemic CHF compensated continue low-salt diet Daily weight monitoring fluid restriction and Lasix 20 mg daily along with Entresto follow-up with cardiology last echocardiogram ejection fraction 45 to 50% minimal improvement

## 2023-05-31 NOTE — PROGRESS NOTES
Dr Shi Ingram Office Visit Note  23     Tiff Richardson 61 y o  female MRN: [id number]  : 1960    Assessment:     1  Chronic combined systolic and diastolic CHF (congestive heart failure) ([facility])  Assessment & Plan:  Wt Readings from Last 3 Encounters:   23 75 7 kg (166 lb 12 8 oz)   23 74 1 kg (163 lb 6 4 oz)   23 73 kg (161 lb)     Patient at present time euvolemic CHF compensated continue low-salt diet Daily weight monitoring fluid restriction and Lasix 20 mg daily along with Entresto follow-up with cardiology last echocardiogram ejection fraction 45 to 50% minimal improvement          2  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([facility] [address] )  Assessment & Plan:  Echocardiogram-CHF compensated patient euvolemic last echocardiogram ejection fraction improved from 40% to 50% cardiology follow-up note reviewed overall stable      3  Acute respiratory failure with hypoxia [facility])  Assessment & Plan: For now resolved due to the acute on chronic combined acute on chronic combined systolic diastolic CHF patient euvolemic CHF compensated no hypoxemia no need for home oxygen    Orders:  -     furosemide (LASIX) 40 mg tablet; Take 0 5 tablets (20 mg total) by mouth daily    4  Uncontrolled type 2 diabetes mellitus with hyperglycemia ([facility])  Assessment & Plan:    Lab Results   Component Value Date    HGBA1C 7 1 (H) 2022   Last A1c 7 5 now much better continue current regimen with Lantus and Xigduo diabetic diet home blood sugar monitoring hypoglycemia protocol in place diabetic diet      5  Anxiety  -     ALPRAZolam (XANAX) 0 5 mg tablet; Take 1 tablet (0 5 mg total) by mouth daily as needed for anxiety    6  Nonsustained paroxysmal ventricular tachycardia  Assessment & Plan:  Patient just finished a Holter about 5 days ago results pending continue metoprolol symptoms controlled      7  Essential hypertension    8   Stage 3b chronic kidney disease [facility])  Assessment & Plan:  Lab Results   Component Value Date    CREATININE 1 05 01/31/2023    CREATININE 1 00 01/30/2023    CREATININE 1 13 01/29/2023    EGFR 57 01/31/2023    EGFR 60 01/30/2023    EGFR 52 01/29/2023   Last blood work patient GFR improved to 71 creatinine at baseline 1 05 avoid nephrotoxic  GFR is baseline  Creat is baseline  Recommend periodic blood test for monitoring of BUN and Creat  Avoid Non Steroidal Antiinflammatory such as ibuprofen, aleve etc   Potassium, HCO3 and calcium are wnl and will require periodic blood test   Bone and Mineral disease monitoring as appropriate  All patient with GFR less than 30( CKD 4 or 5 or 6) advised to follow with nephrologist             Discussion Summary and Plan: Today's care plan and medications were reviewed with patient in detail and all their questions answered to their satisfaction  Chief Complaint   Patient presents with   • Follow-up      Subjective:  Came in follow-up chronic medical condition patient has chronic combined systolic diastolic CHF being followed by cardiology cardiology follow-up notes reviewed repeat echocardiogram shows some improvement ejection fraction from 40 to 50% started Entresto patient feeling better CHF seems to be compensated also history of ventricular tachycardia finished the Holter monitoring 3 days ago results pending also lab last lab BMP done GFR improved to 71 Home blood sugar monitoring shows blood sugar control overall improved given Xanax 0 5 twice a day for anxiety disorder and panic attack      The following portions of the patient's history were reviewed and updated as appropriate: allergies, current medications, past family history, past medical history, past social history, past surgical history and problem list     Review of Systems   Constitutional: Positive for fatigue  Negative for activity change, appetite change, chills, diaphoresis, fever and unexpected weight change     HENT: Negative for congestion, dental problem, drooling, ear discharge, ear pain, facial swelling, hearing loss, mouth sores, nosebleeds, postnasal drip, rhinorrhea, sinus pressure, sneezing, sore throat, tinnitus, trouble swallowing and voice change  Eyes: Negative for photophobia, pain, discharge, redness, itching and visual disturbance  Respiratory: Positive for shortness of breath  Negative for apnea, cough, choking, chest tightness, wheezing and stridor  Cardiovascular: Positive for palpitations  Negative for chest pain and leg swelling  Gastrointestinal: Negative for abdominal distention, abdominal pain, anal bleeding, blood in stool, constipation, diarrhea, nausea, rectal pain and vomiting  Endocrine: Negative for cold intolerance, heat intolerance, polydipsia, polyphagia and polyuria  Genitourinary: Negative for decreased urine volume, difficulty urinating, dysuria, enuresis, flank pain, frequency, genital sores, hematuria and urgency  Musculoskeletal: Positive for arthralgias  Negative for back pain, gait problem, joint swelling, myalgias, neck pain and neck stiffness  Skin: Negative for color change, pallor, rash and wound  Allergic/Immunologic: Negative  Negative for environmental allergies, food allergies and immunocompromised state  Neurological: Negative for dizziness, tremors, seizures, syncope, facial asymmetry, speech difficulty, weakness, light-headedness, numbness and headaches  Psychiatric/Behavioral: Negative for agitation, behavioral problems, confusion, decreased concentration, dysphoric mood, hallucinations, self-injury, sleep disturbance and suicidal ideas  The patient is not nervous/anxious and is not hyperactive            Historical Information   Patient Active Problem List   Diagnosis   • Acquired deformity of foot   • Pain in both feet   • Congenital pes planus of right foot   • Congenital pes planus of left foot   • Hammer toe of left foot   • Harvey's neuroma of second interspace of right foot   • Right foot pain   • Hammer toe of right foot   • Word finding difficulty   • Uncontrolled type 2 diabetes mellitus with hyperglycemia (HCC)   • Hypertensive urgency   • Hyponatremia in the setting of hyperglycemia   • Prolonged QT interval   • HLD (hyperlipidemia)   • Nonsustained paroxysmal ventricular tachycardia   • Essential hypertension   • Blurry vision   • Diarrhea   • Type 2 diabetes mellitus treated with insulin ([place] [address] )   • Overweight (BMI 25 0-29  9)   • 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([place] [address] )   • Acute on chronic combined systolic and diastolic congestive heart failure (HCC)   • Prinzmetal's angina ([facility])   • Deep vein thrombosis (DVT) of left lower extremity ([facility])   • Chest pain   • Acute respiratory failure with hypoxia ([facility])   • Acute on chronic combined systolic and diastolic CHF (congestive heart failure) ([facility])   • Anemia, unspecified type   • Ventricular tachycardia ([facility])   • Stage 3b chronic kidney disease ([facility])   • Type 2 diabetes mellitus with stage 3b chronic kidney disease ([place] [address] )   • Cardiomyopathy, dilated ([facility])   • Chronic combined systolic and diastolic CHF (congestive heart failure) ([place] [address] )   • Anxiety     Past Medical History:   Diagnosis Date   • Abdominal pain    • Anxiety disorder    • Diabetes mellitus ([place] [address] )    • Dizziness    • Heart failure (HCC)    • High cholesterol    • Hypertension    • Lightheadedness    • Palpitations    • SOB (shortness of breath)      History reviewed  No pertinent surgical history  Social History     Substance and Sexual Activity   Alcohol Use Yes    Comment: social     Social History     Substance and Sexual Activity   Drug Use Never     Social History     Tobacco Use   Smoking Status Former   Smokeless Tobacco Former     History reviewed  No pertinent family history    Health Maintenance Due   Topic   • Hepatitis C Screening    • COVID-19 Vaccine (1)   • Pneumococcal Vaccine: Pediatrics (0 to 5 Years) and At-Risk Patients (6 to 59 Years) (1 - PCV)   • DM Eye Exam    • HIV Screening    • Annual Physical    • DTaP,Tdap,and Td Vaccines (1 - Tdap)   • Cervical Cancer Screening    • Breast Cancer Screening: Mammogram    • Colorectal Cancer Screening    • Kidney Health Evaluation: Albumin/Creatinine Ratio    • HEMOGLOBIN A1C       Meds/Allergies       Current Outpatient Medications:   •  Alcohol Swabs 70 % PADS, May substitute brand based on insurance coverage  Check glucose TID , Disp: 100 each, Rfl: 0  •  ALPRAZolam (XANAX) 0 5 mg tablet, Take 1 tablet (0 5 mg total) by mouth daily as needed for anxiety, Disp: 30 tablet, Rfl: 1  •  aspirin 81 MG tablet, Take 81 mg by mouth daily, Disp: , Rfl:   •  atorvastatin (LIPITOR) 80 mg tablet, Take 1 tablet (80 mg total) by mouth every evening, Disp: 30 tablet, Rfl: 5  •  furosemide (LASIX) 40 mg tablet, Take 0 5 tablets (20 mg total) by mouth daily, Disp: 30 tablet, Rfl: 0  •  glucose blood (Contour Next Test) test strip, Use 1 each 3 (three) times a day Use as instructed, Disp: , Rfl:   •  glucose blood (FREESTYLE TEST STRIPS) test strip, May substitute brand based on insurance coverage  Check glucose TID , Disp: 100 each, Rfl: 0  •  glucose monitoring kit (FREESTYLE) monitoring kit, May substitute brand based on insurance coverage  Check glucose TID , Disp: 1 each, Rfl: 0  •  HumaLOG KwikPen 100 units/mL injection pen, INJECT 6 UNITS 3 TIMES A DAY BY SUBCUTANEOUS ROUTE BEFORE MEALS , Disp: 15 mL, Rfl: 3  •  Insulin Pen Needle (BD Pen Needle Megan 2nd Gen) 32G X 4 MM MISC, For use with insulin pen  Pharmacy may dispense brand covered by insurance , Disp: 100 each, Rfl: 0  •  Insulin Pen Needle (BD Pen Needle Megan 2nd Gen) 32G X 4 MM MISC, For use with insulin pen  Pharmacy may dispense brand covered by insurance , Disp: 100 each, Rfl: 0  •  Lantus SoloStar 100 units/mL injection pen, INJECT 15 UNITS UNDER THE SKIN DAILY AT BEDTIME, Disp: 15 mL, Rfl: 3  •  metoprolol succinate (TOPROL-XL) 50 mg 24 hr tablet, Take 1 tablet (50 mg total) by mouth daily In the morning Do not start before February 1, 2023   (Patient "taking differently: Take 50 mg by mouth 2 (two) times a day In the morning), Disp: 30 tablet, Rfl: 0  •  Microlet Lancets MISC, USE TO TEST 3 TIMES A DAY, Disp: 100 each, Rfl: 10  •  nitroglycerin (NITROSTAT) 0 4 mg SL tablet, Place 0 4 mg under the tongue as needed, Disp: , Rfl:   •  potassium chloride (K-DUR,KLOR-CON) 20 mEq tablet, Take 1 tablet (20 mEq total) by mouth daily Do not start before February 1, 2023 , Disp: 14 tablet, Rfl: 0  •  sacubitril-valsartan (Entresto) 49-51 MG TABS, Take 1 tablet by mouth 2 (two) times a day, Disp: , Rfl:   •  Xigduo XR 5-1000 MG TB24, Take 1 tablet by mouth in the morning, Disp: 30 tablet, Rfl: 5      Objective:    Vitals:   /82   Pulse 83   Temp 98 °F (36 7 °C)   Resp 15   Ht 5' 4\" (1 626 m)   Wt 75 7 kg (166 lb 12 8 oz)   SpO2 97%   BMI 28 63 kg/m²   Body mass index is 28 63 kg/m²  Vitals:    05/31/23 1642   Weight: 75 7 kg (166 lb 12 8 oz)       Physical Exam  Vitals and nursing note reviewed  Constitutional:       General: She is not in acute distress  Appearance: She is well-developed  She is not ill-appearing, toxic-appearing or diaphoretic  HENT:      Head: Normocephalic and atraumatic  Right Ear: External ear normal       Left Ear: External ear normal       Nose: Nose normal       Mouth/Throat:      Pharynx: No oropharyngeal exudate  Eyes:      General: Lids are normal  Lids are everted, no foreign bodies appreciated  No scleral icterus  Right eye: No discharge  Left eye: No discharge  Conjunctiva/sclera: Conjunctivae normal       Pupils: Pupils are equal, round, and reactive to light  Neck:      Thyroid: No thyromegaly  Vascular: Normal carotid pulses  No carotid bruit, hepatojugular reflux or JVD  Trachea: No tracheal tenderness or tracheal deviation  Cardiovascular:      Rate and Rhythm: Normal rate and regular rhythm  Pulses: Normal pulses  Heart sounds: No murmur heard  No friction rub   " Gallop present  Pulmonary:      Effort: Pulmonary effort is normal  No respiratory distress  Breath sounds: No stridor  Rales present  No wheezing  Chest:      Chest wall: No tenderness  Abdominal:      General: Bowel sounds are normal  There is no distension  Palpations: Abdomen is soft  There is no mass  Tenderness: There is no abdominal tenderness  There is no guarding or rebound  Musculoskeletal:         General: No tenderness or deformity  Normal range of motion  Cervical back: Normal range of motion and neck supple  No edema, erythema or rigidity  No spinous process tenderness or muscular tenderness  Normal range of motion  Lymphadenopathy:      Head:      Right side of head: No submental, submandibular, tonsillar, preauricular or posterior auricular adenopathy  Left side of head: No submental, submandibular, tonsillar, preauricular, posterior auricular or occipital adenopathy  Cervical: No cervical adenopathy  Right cervical: No superficial, deep or posterior cervical adenopathy  Left cervical: No superficial, deep or posterior cervical adenopathy  Upper Body:      Right upper body: No pectoral adenopathy  Left upper body: No pectoral adenopathy  Skin:     General: Skin is warm and dry  Coloration: Skin is not pale  Findings: No erythema or rash  Neurological:      General: No focal deficit present  Mental Status: She is alert and oriented to person, place, and time  Cranial Nerves: No cranial nerve deficit  Sensory: No sensory deficit  Motor: Weakness present  No tremor, abnormal muscle tone or seizure activity  Coordination: Coordination normal       Gait: Gait normal       Deep Tendon Reflexes: Reflexes are normal and symmetric  Reflexes normal    Psychiatric:         Behavior: Behavior normal          Thought Content:  Thought content normal          Judgment: Judgment normal          Lab Review   No visits with results within 2 Month(s) from this visit  Latest known visit with results is:   No results displayed because visit has over 200 results  Patient Instructions   Heart Failure   AMBULATORY CARE:   Heart failure  is a condition that does not allow your heart to fill or pump properly  Not enough oxygen in your blood gets to your organs and tissues  Fluid may not move through your body properly  Fluid may build up and cause swelling and trouble breathing  This is known as congestive heart failure  It is important to manage your health to improve your quality of life  Signs and symptoms  depend on the type of heart failure you have and how severe it is  You may have any of the following:  Trouble breathing with activity that worsens to trouble breathing at rest    Shortness of breath while lying flat    Severe shortness of breath and coughing at night that usually wakes you    Feeling lightheaded when you stand up    Purple color around your mouth and nails    Confusion or anxiety    Chest pain at night    Periods of no breathing, then breathing fast    Lack of energy (often worsened by physical activity), or trouble sleeping    Swelling in your ankles, legs, or abdomen    Heartbeat that is fast or not regular    Fingers and toes feel cool to the touch    Call your local emergency number (911 in the [address] [facility]) if:   You have any of the following signs of a heart attack:      Squeezing, pressure, or pain in your chest    You may  also have any of the following:     Discomfort or pain in your back, neck, jaw, stomach, or arm    Shortness of breath    Nausea or vomiting    Lightheadedness or a sudden cold sweat      Seek immediate care if:   Your heartbeat is fast, slow, or uneven all the time        Call your doctor if:   You have symptoms of worsening heart failure:      Shortness of breath at rest, at night, or that is getting worse in any way    Weight gain of 3 or more pounds (1 4 kg) in a day, or more than your healthcare provider says is okay    More swelling in your legs or ankles    Abdominal pain or swelling    More coughing    Loss of appetite    Feeling tired all the time    You feel depressed, or you have lost interest in things you used to enjoy  You often feel worried or afraid  You have questions or concerns about your condition or care  Treatment:  Heart failure is often caused by damage or injury to your heart  The damage may be caused by other heart problems, diabetes, or high blood pressure  The damage may have also been caused by an infection  Your healthcare providers will help you manage any other health conditions that may be causing your heart failure  The goals of treatment are to manage, slow, or reverse heart damage  Treatment may include any of the following:  Medicines  may be needed to help regulate your heart rhythm  You may also need medicines to lower your blood pressure, and to decrease extra fluids  Oxygen  may help you breathe easier if your oxygen level is lower than normal  A CPAP machine may be used to keep your airway open while you sleep  Cardiac rehab  is a program run by specialists who will help you safely strengthen your heart  In the program you will learn about exercise, relaxation, stress management, and heart-healthy nutrition  Cardiac rehab may be recommended if your heart failure is not severe  Surgery  can be done to implant a pacemaker or another device in your chest to regulate your heart rhythm  Other types of surgery can open blocked heart vessels, replace a damaged heart valve, or remove scar tissue  Manage swelling from extra fluid:   Elevate (raise) your legs above the level of your heart  This will help with fluid that builds up in your legs or ankles  Elevate your legs as often as possible during the day  Prop your legs on pillows or blankets to keep them elevated comfortably   Try not to stand for long periods of time during the day  Move around to keep your blood circulating  Limit sodium (salt)  Ask how much sodium you can have each day  Your healthcare provider may give you a limit, such as 2,300 milligrams (mg) a day  Your provider or a dietitian can teach you how to read food labels for the number of mg in a food  He or she can also help you find ways to have less salt  For example, if you add salt to food as you cook, do not add more at the table  Drink liquids as directed  You may need to limit the amount of liquid you drink within 24 hours  Your healthcare provider will tell you how much liquid to have and which liquids are best for you  He or she may tell you to limit liquid to 1 5 to 2 liters in a day  He or she will also tell you how often to drink liquid throughout the day  Weigh yourself every morning  Use the same scale, in the same spot  Do this after you use the bathroom, but before you eat or drink  Wear the same type of clothing each time  Write down your weight and call your healthcare provider if you have a sudden weight gain  Swelling and weight gain are signs of fluid buildup  Manage heart failure: Your quality of life may improve with treatment and the following:  Do not smoke  Nicotine and other chemicals in cigarettes and cigars can cause lung and heart damage  Ask your healthcare provider for information if you currently smoke and need help to quit  E-cigarettes or smokeless tobacco still contain nicotine  Talk to your healthcare provider before you use these products  Do not drink alcohol or use illegal drugs  Alcohol and drugs can increase your risk for high blood pressure, diabetes, and coronary artery disease  Eat heart-healthy foods  Heart-healthy foods include fruits, vegetables, lean meat (such as beef, chicken, or pork), and low-fat dairy products  Fatty fish such as salmon and tuna are also heart healthy   Other heart-healthy foods include walnuts, whole-grain breads, and legumes such as ferrell beans  Replace butter and margarine with heart-healthy oils such as olive oil or canola oil  Your provider or a dietitian can help you create heart-healthy meal plans  Manage any chronic health conditions you have  These include high blood pressure, diabetes, obesity, high cholesterol, metabolic syndrome, and COPD  You will have fewer symptoms if you manage these health conditions  Follow your healthcare provider's recommendations and follow up with him or her regularly  Maintain a healthy weight  Being overweight can increase your risk for high blood pressure, diabetes, and coronary artery disease  These conditions can make your symptoms worse  Ask your healthcare provider what a healthy weight is for you  Ask him or her to help you create a weight loss plan, if needed  Stay active  Activity can help keep your symptoms from getting worse  Walking is a type of physical activity that helps maintain your strength and improve your mood  Physical activity also helps you manage your weight  Work with your healthcare provider to create an exercise plan that is right for you  Get vaccines as directed  The flu, COVID-19, and pneumonia can be severe for a person who has heart failure  Vaccines protect you from these infections  Get a flu vaccine every year as soon as it is recommended, usually in September or October  Get a COVID-19 vaccine and booster as directed  You may also need the pneumonia vaccine  Your healthcare provider can tell you if you need other vaccines, and when to get them  Follow up with your doctor or cardiologist within 7 days or as directed: You may need to return for other tests  You may need home health care  A healthcare provider will monitor your vital signs, weight, and make sure your medicines are working  Write down your questions so you remember to ask them during your visits    For support or more information:  Heart failure can be "difficult to manage  It may be helpful to talk with others who have heart failure  You may learn how to better manage your condition or get emotional support  For more information:  [address] [place] [address]  Tyler Mcarthur   Phone: [phone]  Web Address: https://[employer]/  [facility]     © Copyright Latosha Sauceda 2022 Information is for End User's use only and may not be sold, redistributed or otherwise used for commercial purposes  The above information is an  only  It is not intended as medical advice for individual conditions or treatments  Talk to your doctor, nurse or pharmacist before following any medical regimen to see if it is safe and effective for you  Katt Zurita MD        \"This note has been constructed using a voice recognition system  Therefore there may be syntax, spelling, and/or grammatical errors  Please call if you have any questions   \"  "

## 2023-05-31 NOTE — ASSESSMENT & PLAN NOTE
Lab Results   Component Value Date    HGBA1C 7 1 (H) 12/05/2022   Blood sugar control base of A1c GFR improved to 71 hypoglycemia protocol in place continue diabetic diet dilated eye exam once a year foot exam normal and current continue current regimen Lantus Solostar 15 units daily and Xigduo XR 5/1000 mg once a day

## 2023-05-31 NOTE — ASSESSMENT & PLAN NOTE
Patient just finished a Holter about 5 days ago results pending continue metoprolol symptoms controlled

## 2023-05-31 NOTE — ASSESSMENT & PLAN NOTE
For now resolved due to the acute on chronic combined acute on chronic combined systolic diastolic CHF patient euvolemic CHF compensated no hypoxemia no need for home oxygen

## 2023-05-31 NOTE — ASSESSMENT & PLAN NOTE
Lab Results   Component Value Date    HGBA1C 7 1 (H) 12/05/2022   Last A1c 7 5 now much better continue current regimen with Lantus and Xigduo diabetic diet home blood sugar monitoring hypoglycemia protocol in place diabetic diet

## 2023-05-31 NOTE — ASSESSMENT & PLAN NOTE
Echocardiogram-CHF compensated patient euvolemic last echocardiogram ejection fraction improved from 40% to 50% cardiology follow-up note reviewed overall stable

## 2023-05-31 NOTE — ASSESSMENT & PLAN NOTE
Lab Results   Component Value Date    CREATININE 1 05 01/31/2023    CREATININE 1 00 01/30/2023    CREATININE 1 13 01/29/2023    EGFR 57 01/31/2023    EGFR 60 01/30/2023    EGFR 52 01/29/2023   Last blood work patient GFR improved to 71 creatinine at baseline 1 05 avoid nephrotoxic  GFR is baseline  Creat is baseline  Recommend periodic blood test for monitoring of BUN and Creat  Avoid Non Steroidal Antiinflammatory such as ibuprofen, aleve etc   Potassium, HCO3 and calcium are wnl and will require periodic blood test   Bone and Mineral disease monitoring as appropriate    All patient with GFR less than 30( CKD 4 or 5 or 6) advised to follow with nephrologist

## 2023-06-21 ENCOUNTER — OFFICE VISIT (OUTPATIENT)
Dept: INTERNAL MEDICINE CLINIC | Facility: CLINIC | Age: 63
End: 2023-06-21
Payer: COMMERCIAL

## 2023-06-21 VITALS
TEMPERATURE: 97.8 F | RESPIRATION RATE: 16 BRPM | HEART RATE: 80 BPM | OXYGEN SATURATION: 98 % | DIASTOLIC BLOOD PRESSURE: 80 MMHG | BODY MASS INDEX: 28.51 KG/M2 | HEIGHT: 64 IN | SYSTOLIC BLOOD PRESSURE: 140 MMHG | WEIGHT: 167 LBS

## 2023-06-21 DIAGNOSIS — E78.2 MIXED HYPERLIPIDEMIA: ICD-10-CM

## 2023-06-21 DIAGNOSIS — N18.32 TYPE 2 DIABETES MELLITUS WITH STAGE 3B CHRONIC KIDNEY DISEASE, WITH LONG-TERM CURRENT USE OF INSULIN (HCC): Primary | ICD-10-CM

## 2023-06-21 DIAGNOSIS — Z13.0 SCREENING FOR DEFICIENCY ANEMIA: ICD-10-CM

## 2023-06-21 DIAGNOSIS — I82.4Y2 ACUTE DEEP VEIN THROMBOSIS (DVT) OF PROXIMAL VEIN OF LEFT LOWER EXTREMITY (HCC): ICD-10-CM

## 2023-06-21 DIAGNOSIS — I50.43 ACUTE ON CHRONIC COMBINED SYSTOLIC AND DIASTOLIC CONGESTIVE HEART FAILURE (HCC): ICD-10-CM

## 2023-06-21 DIAGNOSIS — Z79.4 TYPE 2 DIABETES MELLITUS WITH STAGE 3B CHRONIC KIDNEY DISEASE, WITH LONG-TERM CURRENT USE OF INSULIN (HCC): Primary | ICD-10-CM

## 2023-06-21 DIAGNOSIS — I42.0 CARDIOMYOPATHY, DILATED (HCC): ICD-10-CM

## 2023-06-21 DIAGNOSIS — E11.22 TYPE 2 DIABETES MELLITUS WITH STAGE 3B CHRONIC KIDNEY DISEASE, WITH LONG-TERM CURRENT USE OF INSULIN (HCC): Primary | ICD-10-CM

## 2023-06-21 DIAGNOSIS — I47.29 NONSUSTAINED PAROXYSMAL VENTRICULAR TACHYCARDIA (HCC): ICD-10-CM

## 2023-06-21 DIAGNOSIS — E03.9 HYPOTHYROIDISM, UNSPECIFIED TYPE: ICD-10-CM

## 2023-06-21 DIAGNOSIS — B33.24 VIRAL CARDIOMYOPATHY (HCC): ICD-10-CM

## 2023-06-21 DIAGNOSIS — I10 ESSENTIAL HYPERTENSION: ICD-10-CM

## 2023-06-21 PROCEDURE — 99214 OFFICE O/P EST MOD 30 MIN: CPT | Performed by: INTERNAL MEDICINE

## 2023-06-21 NOTE — PATIENT INSTRUCTIONS
Heart Failure   AMBULATORY CARE:   Heart failure  is a condition that does not allow your heart to fill or pump properly  Not enough oxygen in your blood gets to your organs and tissues  Fluid may not move through your body properly  Fluid may build up and cause swelling and trouble breathing  This is known as congestive heart failure  It is important to manage your health to improve your quality of life  Signs and symptoms  depend on the type of heart failure you have and how severe it is  You may have any of the following:  Trouble breathing with activity that worsens to trouble breathing at rest    Shortness of breath while lying flat    Severe shortness of breath and coughing at night that usually wakes you    Feeling lightheaded when you stand up    Purple color around your mouth and nails    Confusion or anxiety    Chest pain at night    Periods of no breathing, then breathing fast    Lack of energy (often worsened by physical activity), or trouble sleeping    Swelling in your ankles, legs, or abdomen    Heartbeat that is fast or not regular    Fingers and toes feel cool to the touch    Call your local emergency number (911 in the [address] [facility]) if:   You have any of the following signs of a heart attack:      Squeezing, pressure, or pain in your chest    You may  also have any of the following:     Discomfort or pain in your back, neck, jaw, stomach, or arm    Shortness of breath    Nausea or vomiting    Lightheadedness or a sudden cold sweat      Seek immediate care if:   Your heartbeat is fast, slow, or uneven all the time        Call your doctor if:   You have symptoms of worsening heart failure:      Shortness of breath at rest, at night, or that is getting worse in any way    Weight gain of 3 or more pounds (1 4 kg) in a day, or more than your healthcare provider says is okay    More swelling in your legs or ankles    Abdominal pain or swelling    More coughing    Loss of appetite    Feeling tired all the time    You feel depressed, or you have lost interest in things you used to enjoy  You often feel worried or afraid  You have questions or concerns about your condition or care  Treatment:  Heart failure is often caused by damage or injury to your heart  The damage may be caused by other heart problems, diabetes, or high blood pressure  The damage may have also been caused by an infection  Your healthcare providers will help you manage any other health conditions that may be causing your heart failure  The goals of treatment are to manage, slow, or reverse heart damage  Treatment may include any of the following:  Medicines  may be needed to help regulate your heart rhythm  You may also need medicines to lower your blood pressure, and to decrease extra fluids  Oxygen  may help you breathe easier if your oxygen level is lower than normal  A CPAP machine may be used to keep your airway open while you sleep  Cardiac rehab  is a program run by specialists who will help you safely strengthen your heart  In the program you will learn about exercise, relaxation, stress management, and heart-healthy nutrition  Cardiac rehab may be recommended if your heart failure is not severe  Surgery  can be done to implant a pacemaker or another device in your chest to regulate your heart rhythm  Other types of surgery can open blocked heart vessels, replace a damaged heart valve, or remove scar tissue  Manage swelling from extra fluid:   Elevate (raise) your legs above the level of your heart  This will help with fluid that builds up in your legs or ankles  Elevate your legs as often as possible during the day  Prop your legs on pillows or blankets to keep them elevated comfortably  Try not to stand for long periods of time during the day  Move around to keep your blood circulating  Limit sodium (salt)  Ask how much sodium you can have each day   Your healthcare provider may give you a limit, such as 2,300 milligrams (mg) a day  Your provider or a dietitian can teach you how to read food labels for the number of mg in a food  He or she can also help you find ways to have less salt  For example, if you add salt to food as you cook, do not add more at the table  Drink liquids as directed  You may need to limit the amount of liquid you drink within 24 hours  Your healthcare provider will tell you how much liquid to have and which liquids are best for you  He or she may tell you to limit liquid to 1 5 to 2 liters in a day  He or she will also tell you how often to drink liquid throughout the day  Weigh yourself every morning  Use the same scale, in the same spot  Do this after you use the bathroom, but before you eat or drink  Wear the same type of clothing each time  Write down your weight and call your healthcare provider if you have a sudden weight gain  Swelling and weight gain are signs of fluid buildup  Manage heart failure: Your quality of life may improve with treatment and the following:  Do not smoke  Nicotine and other chemicals in cigarettes and cigars can cause lung and heart damage  Ask your healthcare provider for information if you currently smoke and need help to quit  E-cigarettes or smokeless tobacco still contain nicotine  Talk to your healthcare provider before you use these products  Do not drink alcohol or use illegal drugs  Alcohol and drugs can increase your risk for high blood pressure, diabetes, and coronary artery disease  Eat heart-healthy foods  Heart-healthy foods include fruits, vegetables, lean meat (such as beef, chicken, or pork), and low-fat dairy products  Fatty fish such as salmon and tuna are also heart healthy  Other heart-healthy foods include walnuts, whole-grain breads, and legumes such as ferrell beans  Replace butter and margarine with heart-healthy oils such as olive oil or canola oil   Your provider or a dietitian can help you create heart-healthy meal plans  Manage any chronic health conditions you have  These include high blood pressure, diabetes, obesity, high cholesterol, metabolic syndrome, and COPD  You will have fewer symptoms if you manage these health conditions  Follow your healthcare provider's recommendations and follow up with him or her regularly  Maintain a healthy weight  Being overweight can increase your risk for high blood pressure, diabetes, and coronary artery disease  These conditions can make your symptoms worse  Ask your healthcare provider what a healthy weight is for you  Ask him or her to help you create a weight loss plan, if needed  Stay active  Activity can help keep your symptoms from getting worse  Walking is a type of physical activity that helps maintain your strength and improve your mood  Physical activity also helps you manage your weight  Work with your healthcare provider to create an exercise plan that is right for you  Get vaccines as directed  The flu, COVID-19, and pneumonia can be severe for a person who has heart failure  Vaccines protect you from these infections  Get a flu vaccine every year as soon as it is recommended, usually in September or October  Get a COVID-19 vaccine and booster as directed  You may also need the pneumonia vaccine  Your healthcare provider can tell you if you need other vaccines, and when to get them  Follow up with your doctor or cardiologist within 7 days or as directed: You may need to return for other tests  You may need home health care  A healthcare provider will monitor your vital signs, weight, and make sure your medicines are working  Write down your questions so you remember to ask them during your visits  For support or more information:  Heart failure can be difficult to manage  It may be helpful to talk with others who have heart failure  You may learn how to better manage your condition or get emotional support   For more information:  [address]  [place]   Phone: [phone]  Web Address: https://www strong com/  org     © Copyright [employer] Her[employer] 2022 Information is for End User's use only and may not be sold, redistributed or otherwise used for commercial purposes  The above information is an  only  It is not intended as medical advice for individual conditions or treatments  Talk to your doctor, nurse or pharmacist before following any medical regimen to see if it is safe and effective for you

## 2023-06-24 NOTE — ASSESSMENT & PLAN NOTE
Listed under visit diagnosis and yearly physical and if needed nephrology consult    Consult given for plastic surgery for breast reductionBlood pressure control continue low-salt diet metoprolol Lasix

## 2023-06-24 NOTE — ASSESSMENT & PLAN NOTE
Lab Results   Component Value Date    HGBA1C 7 1 (H) 12/05/2022   Blood sugar controlled on the current regimen with diabetic diet on the basis of A1c GFR much improved chronic kidney disease stage III resolved hypoglycemia protocol in place advised dilated eye exam foot exam normal and continue monitoring blood sugar at home on Lantus Lantus Solostar 15 units and Xigduo XR 5/thousand once a day

## 2023-06-24 NOTE — PROGRESS NOTES
Dr Nadiya Ennis Office Visit Note  23     Mack Rowan 61 y o  female MRN: [id number]  : 1960    Assessment:     1  Type 2 diabetes mellitus with stage 3b chronic kidney disease, with long-term current use of insulin (HCC)  Assessment & Plan:    Lab Results   Component Value Date    HGBA1C 7 1 (H) 2022   Blood sugar controlled on the current regimen with diabetic diet on the basis of A1c GFR much improved chronic kidney disease stage III resolved hypoglycemia protocol in place advised dilated eye exam foot exam normal and continue monitoring blood sugar at home on Lantus Lantus Solostar 15 units and Xigduo XR 5/thousand once a day    Orders:  -     Hemoglobin A1C; Future; Expected date: 2023  -     Comprehensive metabolic panel; Future; Expected date: 2023  -     Albumin / creatinine urine ratio; Future; Expected date: 2023    2   Acute on chronic combined systolic and diastolic congestive heart failure (HCC)  Assessment & Plan:  Wt Readings from Last 3 Encounters:   23 75 8 kg (167 lb)   23 75 7 kg (166 lb 12 8 oz)   23 74 1 kg (163 lb 6 4 oz)     Patient's CHF seems to be compensated euvolemic continue low-salt diet fluid restriction Daily weight monitoring seen by cardiology ejection fraction seems to be improved the last echocardiogram done 2023 ejection fraction 50 to 55% much improved echocardiogram finding reviewed Lasix was increased 20 mg alternate to 40 mg to keep the patient euvolemic remaining follow-up plan finding as follows from my previous progress note reviewed  Wt Readings from Last 3 Encounters:   23 75 8 kg (167 lb)   23 75 7 kg (166 lb 12 8 oz)   23 74 1 kg (163 lb 6 4 oz)   Patient CHF acute CHF resolved for now looks euvolemic CHF seems to be compensated continue fluid restriction low-salt diet Daily weight monitoring awaiting to be seen by cardiology and following is the plan follow-up test results and findings as follows reviewed from a previous follow-up for the cardiology note  Echo 5/2021 - LVEF 45-50%, grade 1 DD  CTA chest: No pulmonary embolus  Extensive septal thickening and groundglass opacity due to interstitial and alveolar edema with trace effusions  BNP: 342  S/P 20 mg IV Lasix in ED   ECHO : EF 40%, moderately reduced systolic function with global longitudinal strain reduced -10%  Grade 1 diastolic dysfunction  There is abnormal septal motion consistent with LBBB  Net negative urine output and decrease in weight, unclear of accuracy initially on admission  Cardiology consulted:  Recommend furosemide 40 mg p o  daily continue  Recommend close outpatient follow-up with primary cardiologist  Continue 1800 mL Fluid restriction   Monitor Daily weights, I&Os              Orders:  -     Comprehensive metabolic panel; Future; Expected date: 06/21/2023    3  Cardiomyopathy, dilated ([address] )  Assessment & Plan:  Evaluated by cardiology ejection fraction improved about 50 to 55% patient euvolemic CHF compensated on Lasix 20 mg alternating with 40 mg cardiomyopathy possibly due to viral etiology    Orders:  -     Comprehensive metabolic panel; Future; Expected date: 06/21/2023    4  Acute deep vein thrombosis (DVT) of proximal vein of left lower extremity (HCC)  Assessment & Plan:  No DVT on the last Doppler no need for anticoagulation stable no leg edema      5  Essential hypertension  Assessment & Plan:  Listed under visit diagnosis and yearly physical and if needed nephrology consult  Consult given for plastic surgery for breast reductionBlood pressure control continue low-salt diet metoprolol Lasix    Orders:  -     Comprehensive metabolic panel; Future; Expected date: 06/21/2023    6  Nonsustained paroxysmal ventricular tachycardia  Assessment & Plan:  Finished the and outpatient monitoring reviewed stable continue metoprolol no evidence of any sustained ventricular tachycardia      7   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[facility])  Assessment & Plan:  Echocardiogram done March 2023 shows ejection fraction improved 50 to 55% stable      8  Screening for deficiency anemia  -     CBC and differential; Future; Expected date: 06/21/2023    9  Mixed hyperlipidemia  -     Lipid Panel with Direct LDL reflex; Future; Expected date: 06/21/2023    10  Hypothyroidism, unspecified type  -     TSH, 3rd generation with Free T4 reflex; Future; Expected date: 06/21/2023          Discussion Summary and Plan: Today's care plan and medications were reviewed with patient in detail and all their questions answered to their satisfaction  Chief Complaint   Patient presents with   • Annual Exam     Picked up her disability papers  Subjective:  Came in follow-up chronic medical condition especially chronic combined systolic diastolic CHF seems to be compensated patient euvolemic on Lasix patient finished the outpatient cardiac monitoring shows nonsustained ventricular tachycardia and significant cardiac arrhythmia the last echocardiogram reviewed ejection fraction improved 50 to 55% cardiology follow-up note reviewed continue current regimen advised dilated eye exam      The following portions of the patient's history were reviewed and updated as appropriate: allergies, current medications, past family history, past medical history, past social history, past surgical history and problem list   Diabetic Foot Exam    Patient's shoes and socks removed  Right Foot/Ankle   Right Foot Inspection  Skin Exam: skin normal and skin intact  No dry skin, no warmth, no callus, no erythema, no maceration, no abnormal color, no pre-ulcer, no ulcer and no callus  Toe Exam: ROM and strength within normal limits  Sensory   Vibration: intact  Proprioception: intact  Monofilament testing: intact    Vascular  The right DP pulse is 2+  The right PT pulse is 1+  Left Foot/Ankle  Left Foot Inspection  Skin Exam: skin normal and skin intact   No dry skin, no warmth, no erythema, no maceration, normal color, no pre-ulcer, no ulcer and no callus  Toe Exam: ROM and strength within normal limits  Sensory   Vibration: intact  Proprioception: intact  Monofilament testing: intact    Vascular  The left DP pulse is 2+  The left PT pulse is 1+  Assign Risk Category  No deformity present  No loss of protective sensation  No weak pulses  Risk: 0    Review of Systems   Constitutional: Positive for fatigue  Negative for activity change, appetite change, chills, diaphoresis, fever and unexpected weight change  HENT: Negative for congestion, dental problem, drooling, ear discharge, ear pain, facial swelling, hearing loss, mouth sores, nosebleeds, postnasal drip, rhinorrhea, sinus pressure, sneezing, sore throat, tinnitus, trouble swallowing and voice change  Eyes: Negative for photophobia, pain, discharge, redness, itching and visual disturbance  Respiratory: Positive for shortness of breath  Negative for apnea, cough, choking, chest tightness, wheezing and stridor  Cardiovascular: Positive for palpitations  Negative for chest pain and leg swelling  Gastrointestinal: Negative for abdominal distention, abdominal pain, anal bleeding, blood in stool, constipation, diarrhea, nausea, rectal pain and vomiting  Endocrine: Negative for cold intolerance, heat intolerance, polydipsia, polyphagia and polyuria  Genitourinary: Negative for decreased urine volume, difficulty urinating, dysuria, enuresis, flank pain, frequency, genital sores, hematuria and urgency  Musculoskeletal: Negative for back pain, gait problem, joint swelling, myalgias, neck pain and neck stiffness  Skin: Negative for color change, pallor, rash and wound  Allergic/Immunologic: Negative  Negative for environmental allergies, food allergies and immunocompromised state     Neurological: Negative for dizziness, tremors, seizures, syncope, facial asymmetry, speech difficulty, weakness, light-headedness, numbness and headaches  Psychiatric/Behavioral: Negative for agitation, behavioral problems, confusion, decreased concentration, dysphoric mood, hallucinations, self-injury, sleep disturbance and suicidal ideas  The patient is not nervous/anxious and is not hyperactive  Historical Information   Patient Active Problem List   Diagnosis   • Acquired deformity of foot   • Pain in both feet   • Congenital pes planus of right foot   • Congenital pes planus of left foot   • Hammer toe of left foot   • Harvey's neuroma of second interspace of right foot   • Right foot pain   • Hammer toe of right foot   • Word finding difficulty   • Uncontrolled type 2 diabetes mellitus with hyperglycemia ([facility])   • Hypertensive urgency   • Hyponatremia in the setting of hyperglycemia   • Prolonged QT interval   • HLD (hyperlipidemia)   • Nonsustained paroxysmal ventricular tachycardia   • Essential hypertension   • Blurry vision   • Diarrhea   • Type 2 diabetes mellitus treated with insulin ([place] [address] )   • Overweight (BMI 25 0-29  9)   • 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([place] [address] )   • Acute on chronic combined systolic and diastolic congestive heart failure ([facility])   • Prinzmetal's angina ([facility])   • Deep vein thrombosis (DVT) of left lower extremity ([facility])   • Chest pain   • Acute respiratory failure with hypoxia ([facility])   • Acute on chronic combined systolic and diastolic CHF (congestive heart failure) ([facility])   • Anemia, unspecified type   • Ventricular tachycardia ([facility])   • Stage 3b chronic kidney disease ([facility])   • Type 2 diabetes mellitus with stage 3b chronic kidney disease ([facility])   • Cardiomyopathy, dilated ([facility])   • Chronic combined systolic and diastolic CHF (congestive heart failure) ([place] [address] )   • Anxiety     Past Medical History:   Diagnosis Date   • Abdominal pain    • Anxiety disorder    • Diabetes mellitus ([place] [address] )    • Dizziness    • Heart failure ([facility])    • High cholesterol    • Hypertension    • Lightheadedness    • Palpitations    • SOB (shortness of breath)      No past surgical history on file  Social History     Substance and Sexual Activity   Alcohol Use Yes    Comment: social     Social History     Substance and Sexual Activity   Drug Use Never     Social History     Tobacco Use   Smoking Status Former   Smokeless Tobacco Former     No family history on file  Health Maintenance Due   Topic   • Hepatitis C Screening    • COVID-19 Vaccine (1)   • Pneumococcal Vaccine: Pediatrics (0 to 5 Years) and At-Risk Patients (6 to 59 Years) (1 - PCV)   • DM Eye Exam    • HIV Screening    • Annual Physical    • DTaP,Tdap,and Td Vaccines (1 - Tdap)   • Cervical Cancer Screening    • Breast Cancer Screening: Mammogram    • Colorectal Cancer Screening    • Kidney Health Evaluation: Albumin/Creatinine Ratio    • HEMOGLOBIN A1C    • Influenza Vaccine (Season Ended)      Meds/Allergies       Current Outpatient Medications:   •  Alcohol Swabs 70 % PADS, May substitute brand based on insurance coverage  Check glucose TID , Disp: 100 each, Rfl: 0  •  ALPRAZolam (XANAX) 0 5 mg tablet, Take 1 tablet (0 5 mg total) by mouth daily as needed for anxiety, Disp: 30 tablet, Rfl: 1  •  aspirin 81 MG tablet, Take 81 mg by mouth daily, Disp: , Rfl:   •  atorvastatin (LIPITOR) 80 mg tablet, Take 1 tablet (80 mg total) by mouth every evening, Disp: 30 tablet, Rfl: 5  •  furosemide (LASIX) 40 mg tablet, Take 0 5 tablets (20 mg total) by mouth daily, Disp: 30 tablet, Rfl: 0  •  glucose blood (Contour Next Test) test strip, Use 1 each 3 (three) times a day Use as instructed, Disp: , Rfl:   •  glucose blood (FREESTYLE TEST STRIPS) test strip, May substitute brand based on insurance coverage  Check glucose TID , Disp: 100 each, Rfl: 0  •  glucose monitoring kit (FREESTYLE) monitoring kit, May substitute brand based on insurance coverage   Check glucose TID , Disp: 1 each, Rfl: 0  •  HumaLOG KwikPen 100 units/mL injection pen, INJECT 6 UNITS 3 TIMES A DAY BY SUBCUTANEOUS ROUTE "BEFORE MEALS , Disp: 15 mL, Rfl: 3  •  Insulin Pen Needle (BD Pen Needle Megan 2nd Gen) 32G X 4 MM MISC, For use with insulin pen  Pharmacy may dispense brand covered by insurance , Disp: 100 each, Rfl: 0  •  Insulin Pen Needle (BD Pen Needle Megan 2nd Gen) 32G X 4 MM MISC, For use with insulin pen  Pharmacy may dispense brand covered by insurance , Disp: 100 each, Rfl: 0  •  Lantus SoloStar 100 units/mL injection pen, INJECT 15 UNITS UNDER THE SKIN DAILY AT BEDTIME, Disp: 15 mL, Rfl: 3  •  metoprolol succinate (TOPROL-XL) 50 mg 24 hr tablet, Take 1 tablet (50 mg total) by mouth daily In the morning Do not start before February 1, 2023  (Patient taking differently: Take 75 mg by mouth 2 (two) times a day In the morning), Disp: 30 tablet, Rfl: 0  •  Microlet Lancets MISC, USE TO TEST 3 TIMES A DAY, Disp: 100 each, Rfl: 10  •  nitroglycerin (NITROSTAT) 0 4 mg SL tablet, Place 0 4 mg under the tongue as needed, Disp: , Rfl:   •  potassium chloride (K-DUR,KLOR-CON) 20 mEq tablet, Take 1 tablet (20 mEq total) by mouth daily Do not start before February 1, 2023 , Disp: 14 tablet, Rfl: 0  •  sacubitril-valsartan (Entresto) 49-51 MG TABS, Take 1 tablet by mouth 2 (two) times a day, Disp: , Rfl:   •  Xigduo XR 5-1000 MG TB24, Take 1 tablet by mouth in the morning, Disp: 30 tablet, Rfl: 5      Objective:    Vitals:   /80   Pulse 80   Temp 97 8 °F (36 6 °C)   Resp 16   Ht 5' 4\" (1 626 m)   Wt 75 8 kg (167 lb)   SpO2 98%   BMI 28 67 kg/m²   Body mass index is 28 67 kg/m²  Vitals:    06/21/23 1729   Weight: 75 8 kg (167 lb)       Physical Exam  Constitutional:       General: She is not in acute distress  Appearance: She is well-developed  She is not ill-appearing, toxic-appearing or diaphoretic  HENT:      Head: Normocephalic and atraumatic  Right Ear: External ear normal       Left Ear: External ear normal       Nose: Nose normal       Mouth/Throat:      Pharynx: No oropharyngeal exudate     Eyes:      " General: Lids are normal  Lids are everted, no foreign bodies appreciated  No scleral icterus  Right eye: No discharge  Left eye: No discharge  Conjunctiva/sclera: Conjunctivae normal       Pupils: Pupils are equal, round, and reactive to light  Neck:      Thyroid: No thyromegaly  Vascular: Normal carotid pulses  No carotid bruit, hepatojugular reflux or JVD  Trachea: No tracheal tenderness or tracheal deviation  Cardiovascular:      Rate and Rhythm: Normal rate and regular rhythm  Pulses: no weak pulses          Dorsalis pedis pulses are 2+ on the right side and 2+ on the left side  Posterior tibial pulses are 1+ on the right side and 1+ on the left side  Heart sounds: Normal heart sounds  No murmur heard  No friction rub  No gallop  Pulmonary:      Effort: Pulmonary effort is normal  No respiratory distress  Breath sounds: No stridor  Rales present  No wheezing  Chest:      Chest wall: No tenderness  Abdominal:      General: Bowel sounds are normal  There is no distension  Palpations: Abdomen is soft  There is no mass  Tenderness: There is no abdominal tenderness  There is no guarding or rebound  Musculoskeletal:         General: No tenderness or deformity  Normal range of motion  Cervical back: Normal range of motion and neck supple  No edema, erythema or rigidity  No spinous process tenderness or muscular tenderness  Normal range of motion  Feet:      Right foot:      Skin integrity: No ulcer, skin breakdown, erythema, warmth, callus or dry skin  Left foot:      Skin integrity: No ulcer, skin breakdown, erythema, warmth, callus or dry skin  Lymphadenopathy:      Head:      Right side of head: No submental, submandibular, tonsillar, preauricular or posterior auricular adenopathy  Left side of head: No submental, submandibular, tonsillar, preauricular, posterior auricular or occipital adenopathy        Cervical: No cervical adenopathy  Right cervical: No superficial, deep or posterior cervical adenopathy  Left cervical: No superficial, deep or posterior cervical adenopathy  Upper Body:      Right upper body: No pectoral adenopathy  Left upper body: No pectoral adenopathy  Skin:     General: Skin is warm and dry  Coloration: Skin is not pale  Findings: No erythema or rash  Neurological:      General: No focal deficit present  Mental Status: She is alert and oriented to person, place, and time  Cranial Nerves: No cranial nerve deficit  Sensory: No sensory deficit  Motor: Weakness present  No tremor, abnormal muscle tone or seizure activity  Coordination: Coordination normal       Gait: Gait normal       Deep Tendon Reflexes: Reflexes are normal and symmetric  Reflexes normal    Psychiatric:         Behavior: Behavior normal          Thought Content: Thought content normal          Judgment: Judgment normal          Lab Review   No visits with results within 2 Month(s) from this visit  Latest known visit with results is:   No results displayed because visit has over 200 results  Patient Instructions   Heart Failure   AMBULATORY CARE:   Heart failure  is a condition that does not allow your heart to fill or pump properly  Not enough oxygen in your blood gets to your organs and tissues  Fluid may not move through your body properly  Fluid may build up and cause swelling and trouble breathing  This is known as congestive heart failure  It is important to manage your health to improve your quality of life  Signs and symptoms  depend on the type of heart failure you have and how severe it is   You may have any of the following:  Trouble breathing with activity that worsens to trouble breathing at rest    Shortness of breath while lying flat    Severe shortness of breath and coughing at night that usually wakes you    Feeling lightheaded when you stand up    Purple color around your mouth and nails    Confusion or anxiety    Chest pain at night    Periods of no breathing, then breathing fast    Lack of energy (often worsened by physical activity), or trouble sleeping    Swelling in your ankles, legs, or abdomen    Heartbeat that is fast or not regular    Fingers and toes feel cool to the touch    Call your local emergency number (911 in the [address] [facility]) if:   You have any of the following signs of a heart attack:      Squeezing, pressure, or pain in your chest    You may  also have any of the following:     Discomfort or pain in your back, neck, jaw, stomach, or arm    Shortness of breath    Nausea or vomiting    Lightheadedness or a sudden cold sweat      Seek immediate care if:   Your heartbeat is fast, slow, or uneven all the time  Call your doctor if:   You have symptoms of worsening heart failure:      Shortness of breath at rest, at night, or that is getting worse in any way    Weight gain of 3 or more pounds (1 4 kg) in a day, or more than your healthcare provider says is okay    More swelling in your legs or ankles    Abdominal pain or swelling    More coughing    Loss of appetite    Feeling tired all the time    You feel depressed, or you have lost interest in things you used to enjoy  You often feel worried or afraid  You have questions or concerns about your condition or care  Treatment:  Heart failure is often caused by damage or injury to your heart  The damage may be caused by other heart problems, diabetes, or high blood pressure  The damage may have also been caused by an infection  Your healthcare providers will help you manage any other health conditions that may be causing your heart failure  The goals of treatment are to manage, slow, or reverse heart damage  Treatment may include any of the following:  Medicines  may be needed to help regulate your heart rhythm   You may also need medicines to lower your blood pressure, and to decrease extra fluids  Oxygen  may help you breathe easier if your oxygen level is lower than normal  A CPAP machine may be used to keep your airway open while you sleep  Cardiac rehab  is a program run by specialists who will help you safely strengthen your heart  In the program you will learn about exercise, relaxation, stress management, and heart-healthy nutrition  Cardiac rehab may be recommended if your heart failure is not severe  Surgery  can be done to implant a pacemaker or another device in your chest to regulate your heart rhythm  Other types of surgery can open blocked heart vessels, replace a damaged heart valve, or remove scar tissue  Manage swelling from extra fluid:   Elevate (raise) your legs above the level of your heart  This will help with fluid that builds up in your legs or ankles  Elevate your legs as often as possible during the day  Prop your legs on pillows or blankets to keep them elevated comfortably  Try not to stand for long periods of time during the day  Move around to keep your blood circulating  Limit sodium (salt)  Ask how much sodium you can have each day  Your healthcare provider may give you a limit, such as 2,300 milligrams (mg) a day  Your provider or a dietitian can teach you how to read food labels for the number of mg in a food  He or she can also help you find ways to have less salt  For example, if you add salt to food as you cook, do not add more at the table  Drink liquids as directed  You may need to limit the amount of liquid you drink within 24 hours  Your healthcare provider will tell you how much liquid to have and which liquids are best for you  He or she may tell you to limit liquid to 1 5 to 2 liters in a day  He or she will also tell you how often to drink liquid throughout the day  Weigh yourself every morning  Use the same scale, in the same spot  Do this after you use the bathroom, but before you eat or drink   Wear the same type of clothing each time  Write down your weight and call your healthcare provider if you have a sudden weight gain  Swelling and weight gain are signs of fluid buildup  Manage heart failure: Your quality of life may improve with treatment and the following:  Do not smoke  Nicotine and other chemicals in cigarettes and cigars can cause lung and heart damage  Ask your healthcare provider for information if you currently smoke and need help to quit  E-cigarettes or smokeless tobacco still contain nicotine  Talk to your healthcare provider before you use these products  Do not drink alcohol or use illegal drugs  Alcohol and drugs can increase your risk for high blood pressure, diabetes, and coronary artery disease  Eat heart-healthy foods  Heart-healthy foods include fruits, vegetables, lean meat (such as beef, chicken, or pork), and low-fat dairy products  Fatty fish such as salmon and tuna are also heart healthy  Other heart-healthy foods include walnuts, whole-grain breads, and legumes such as ferrell beans  Replace butter and margarine with heart-healthy oils such as olive oil or canola oil  Your provider or a dietitian can help you create heart-healthy meal plans  Manage any chronic health conditions you have  These include high blood pressure, diabetes, obesity, high cholesterol, metabolic syndrome, and COPD  You will have fewer symptoms if you manage these health conditions  Follow your healthcare provider's recommendations and follow up with him or her regularly  Maintain a healthy weight  Being overweight can increase your risk for high blood pressure, diabetes, and coronary artery disease  These conditions can make your symptoms worse  Ask your healthcare provider what a healthy weight is for you  Ask him or her to help you create a weight loss plan, if needed  Stay active  Activity can help keep your symptoms from getting worse   Walking is a type of physical activity that "helps maintain your strength and improve your mood  Physical activity also helps you manage your weight  Work with your healthcare provider to create an exercise plan that is right for you  Get vaccines as directed  The flu, COVID-19, and pneumonia can be severe for a person who has heart failure  Vaccines protect you from these infections  Get a flu vaccine every year as soon as it is recommended, usually in September or October  Get a COVID-19 vaccine and booster as directed  You may also need the pneumonia vaccine  Your healthcare provider can tell you if you need other vaccines, and when to get them  Follow up with your doctor or cardiologist within 7 days or as directed: You may need to return for other tests  You may need home health care  A healthcare provider will monitor your vital signs, weight, and make sure your medicines are working  Write down your questions so you remember to ask them during your visits  For support or more information:  Heart failure can be difficult to manage  It may be helpful to talk with others who have heart failure  You may learn how to better manage your condition or get emotional support  For more information:  [address] [place] [address]   Phone: [phone]  Web Address: https://[employer]/  org     © Copyright Colleen Lewis 2022 Information is for End User's use only and may not be sold, redistributed or otherwise used for commercial purposes  The above information is an  only  It is not intended as medical advice for individual conditions or treatments  Talk to your doctor, nurse or pharmacist before following any medical regimen to see if it is safe and effective for you  Kwabena Richards MD        \"This note has been constructed using a voice recognition system  Therefore there may be syntax, spelling, and/or grammatical errors  Please call if you have any questions   \"  "

## 2023-06-24 NOTE — ASSESSMENT & PLAN NOTE
Finished the and outpatient monitoring reviewed stable continue metoprolol no evidence of any sustained ventricular tachycardia

## 2023-06-24 NOTE — ASSESSMENT & PLAN NOTE
Wt Readings from Last 3 Encounters:   06/21/23 75 8 kg (167 lb)   05/31/23 75 7 kg (166 lb 12 8 oz)   03/29/23 74 1 kg (163 lb 6 4 oz)     Patient's CHF seems to be compensated euvolemic continue low-salt diet fluid restriction Daily weight monitoring seen by cardiology ejection fraction seems to be improved the last echocardiogram done March 2023 ejection fraction 50 to 55% much improved echocardiogram finding reviewed Lasix was increased 20 mg alternate to 40 mg to keep the patient euvolemic remaining follow-up plan finding as follows from my previous progress note reviewed  Wt Readings from Last 3 Encounters:   06/21/23 75 8 kg (167 lb)   05/31/23 75 7 kg (166 lb 12 8 oz)   03/29/23 74 1 kg (163 lb 6 4 oz)   Patient CHF acute CHF resolved for now looks euvolemic CHF seems to be compensated continue fluid restriction low-salt diet Daily weight monitoring awaiting to be seen by cardiology and following is the plan follow-up test results and findings as follows reviewed from a previous follow-up for the cardiology note  • Echo 5/2021 - LVEF 45-50%, grade 1 DD  • CTA chest: No pulmonary embolus  Extensive septal thickening and groundglass opacity due to interstitial and alveolar edema with trace effusions  • BNP: 342  S/P 20 mg IV Lasix in ED  • ECHO : EF 40%, moderately reduced systolic function with global longitudinal strain reduced -10%  Grade 1 diastolic dysfunction  There is abnormal septal motion consistent with LBBB  • Net negative urine output and decrease in weight, unclear of accuracy initially on admission  • Cardiology consulted:  ? Recommend furosemide 40 mg p o  daily continue  ?  Recommend close outpatient follow-up with primary cardiologist  • Continue 1800 mL Fluid restriction   Monitor Daily weights, I&Os

## 2023-06-24 NOTE — ASSESSMENT & PLAN NOTE
Evaluated by cardiology ejection fraction improved about 50 to 55% patient euvolemic CHF compensated on Lasix 20 mg alternating with 40 mg cardiomyopathy possibly due to viral etiology

## 2023-07-02 DIAGNOSIS — E11.649 UNCONTROLLED TYPE 2 DIABETES MELLITUS WITH HYPOGLYCEMIA WITHOUT COMA (HCC): ICD-10-CM

## 2023-07-03 RX ORDER — INSULIN LISPRO 100 [IU]/ML
INJECTION, SOLUTION INTRAVENOUS; SUBCUTANEOUS
Qty: 15 ML | Refills: 3 | Status: SHIPPED | OUTPATIENT
Start: 2023-07-03

## 2023-08-23 ENCOUNTER — RA CDI HCC (OUTPATIENT)
Dept: OTHER | Facility: HOSPITAL | Age: 63
End: 2023-08-23

## 2023-08-23 NOTE — PROGRESS NOTES
[address] [facility] coding opportunities          Chart Reviewed number of suggestions sent to Provider: 1   I11.0    Patients Insurance        Commercial Insurance: [employer]

## 2023-08-30 ENCOUNTER — OFFICE VISIT (OUTPATIENT)
Dept: INTERNAL MEDICINE CLINIC | Facility: CLINIC | Age: 63
End: 2023-08-30
Payer: COMMERCIAL

## 2023-08-30 VITALS
DIASTOLIC BLOOD PRESSURE: 80 MMHG | HEIGHT: 64 IN | HEART RATE: 74 BPM | RESPIRATION RATE: 16 BRPM | OXYGEN SATURATION: 97 % | TEMPERATURE: 98 F | SYSTOLIC BLOOD PRESSURE: 120 MMHG | WEIGHT: 170 LBS | BODY MASS INDEX: 29.02 KG/M2

## 2023-08-30 DIAGNOSIS — B33.24 VIRAL CARDIOMYOPATHY (HCC): ICD-10-CM

## 2023-08-30 DIAGNOSIS — E11.65 UNCONTROLLED TYPE 2 DIABETES MELLITUS WITH HYPERGLYCEMIA (HCC): Primary | ICD-10-CM

## 2023-08-30 DIAGNOSIS — F41.9 ANXIETY: ICD-10-CM

## 2023-08-30 DIAGNOSIS — I50.42 CHRONIC COMBINED SYSTOLIC AND DIASTOLIC CHF (CONGESTIVE HEART FAILURE) (HCC): ICD-10-CM

## 2023-08-30 DIAGNOSIS — N18.32 TYPE 2 DIABETES MELLITUS WITH STAGE 3B CHRONIC KIDNEY DISEASE, WITH LONG-TERM CURRENT USE OF INSULIN (HCC): ICD-10-CM

## 2023-08-30 DIAGNOSIS — I47.20 VENTRICULAR TACHYCARDIA (HCC): ICD-10-CM

## 2023-08-30 DIAGNOSIS — Z79.4 TYPE 2 DIABETES MELLITUS WITH STAGE 3B CHRONIC KIDNEY DISEASE, WITH LONG-TERM CURRENT USE OF INSULIN (HCC): ICD-10-CM

## 2023-08-30 DIAGNOSIS — I42.0 CARDIOMYOPATHY, DILATED (HCC): ICD-10-CM

## 2023-08-30 DIAGNOSIS — E78.2 HYPERLIPIDEMIA, MIXED: ICD-10-CM

## 2023-08-30 DIAGNOSIS — E11.22 TYPE 2 DIABETES MELLITUS WITH STAGE 3B CHRONIC KIDNEY DISEASE, WITH LONG-TERM CURRENT USE OF INSULIN (HCC): ICD-10-CM

## 2023-08-30 PROCEDURE — 99214 OFFICE O/P EST MOD 30 MIN: CPT | Performed by: INTERNAL MEDICINE

## 2023-08-30 RX ORDER — ALPRAZOLAM 0.5 MG/1
0.5 TABLET ORAL DAILY PRN
Qty: 30 TABLET | Refills: 1 | Status: SHIPPED | OUTPATIENT
Start: 2023-08-30

## 2023-08-30 RX ORDER — PEN NEEDLE, DIABETIC 32GX 5/32"
NEEDLE, DISPOSABLE MISCELLANEOUS
Qty: 100 EACH | Refills: 5 | Status: SHIPPED | OUTPATIENT
Start: 2023-08-30

## 2023-08-30 NOTE — PATIENT INSTRUCTIONS
Heart Failure   AMBULATORY CARE:   Heart failure  is a condition that does not allow your heart to fill or pump properly. Not enough oxygen in your blood gets to your organs and tissues. Fluid may not move through your body properly. Fluid may build up and cause swelling and trouble breathing. This is known as congestive heart failure. It is important to manage your health to improve your quality of life. Signs and symptoms  depend on the type of heart failure you have and how severe it is. You may have any of the following:  Trouble breathing with activity that worsens to trouble breathing at rest    Shortness of breath while lying flat    Severe shortness of breath and coughing at night that usually wakes you    Feeling lightheaded when you stand up    Purple color around your mouth and nails    Confusion or anxiety    Chest pain at night    Periods of no breathing, then breathing fast    Lack of energy (often worsened by physical activity), or trouble sleeping    Swelling in your ankles, legs, or abdomen    Heartbeat that is fast or not regular    Fingers and toes feel cool to the touch    Call your local emergency number (911 in the [address]) if:   You have any of the following signs of a heart attack:      Squeezing, pressure, or pain in your chest    You may  also have any of the following:     Discomfort or pain in your back, neck, jaw, stomach, or arm    Shortness of breath    Nausea or vomiting    Lightheadedness or a sudden cold sweat      Seek immediate care if:   Your heartbeat is fast, slow, or uneven all the time.       Call your doctor if:   You have symptoms of worsening heart failure:      Shortness of breath at rest, at night, or that is getting worse in any way    Weight gain of 3 or more pounds (1.4 kg) in a day, or more than your healthcare provider says is okay    More swelling in your legs or ankles    Abdominal pain or swelling    More coughing    Loss of appetite    Feeling tired all the time    You feel depressed, or you have lost interest in things you used to enjoy. You often feel worried or afraid. You have questions or concerns about your condition or care. Treatment:  Heart failure is often caused by damage or injury to your heart. The damage may be caused by other heart problems, diabetes, or high blood pressure. The damage may have also been caused by an infection. Your healthcare providers will help you manage any other health conditions that may be causing your heart failure. The goals of treatment are to manage, slow, or reverse heart damage. Treatment may include any of the following:  Medicines  may be needed to help regulate your heart rhythm. You may also need medicines to lower your blood pressure, and to decrease extra fluids. Oxygen  may help you breathe easier if your oxygen level is lower than normal. A CPAP machine may be used to keep your airway open while you sleep. Cardiac rehab  is a program run by specialists who will help you safely strengthen your heart. In the program you will learn about exercise, relaxation, stress management, and heart-healthy nutrition. Cardiac rehab may be recommended if your heart failure is not severe. Surgery  can be done to implant a pacemaker or another device in your chest to regulate your heart rhythm. Other types of surgery can open blocked heart vessels, replace a damaged heart valve, or remove scar tissue. Manage swelling from extra fluid:   Elevate (raise) your legs above the level of your heart. This will help with fluid that builds up in your legs or ankles. Elevate your legs as often as possible during the day. Prop your legs on pillows or blankets to keep them elevated comfortably. Try not to stand for long periods of time during the day. Move around to keep your blood circulating. Limit sodium (salt). Ask how much sodium you can have each day.  Your healthcare provider may give you a limit, such as 2,300 milligrams (mg) a day. Your provider or a dietitian can teach you how to read food labels for the number of mg in a food. He or she can also help you find ways to have less salt. For example, if you add salt to food as you cook, do not add more at the table. Drink liquids as directed. You may need to limit the amount of liquid you drink within 24 hours. Your healthcare provider will tell you how much liquid to have and which liquids are best for you. He or she may tell you to limit liquid to 1.5 to 2 liters in a day. He or she will also tell you how often to drink liquid throughout the day. Weigh yourself every morning. Use the same scale, in the same spot. Do this after you use the bathroom, but before you eat or drink. Wear the same type of clothing each time. Write down your weight and call your healthcare provider if you have a sudden weight gain. Swelling and weight gain are signs of fluid buildup. Manage heart failure: Your quality of life may improve with treatment and the following:  Do not smoke. Nicotine and other chemicals in cigarettes and cigars can cause lung and heart damage. Ask your healthcare provider for information if you currently smoke and need help to quit. E-cigarettes or smokeless tobacco still contain nicotine. Talk to your healthcare provider before you use these products. Do not drink alcohol or use illegal drugs. Alcohol and drugs can increase your risk for high blood pressure, diabetes, and coronary artery disease. Eat heart-healthy foods. Heart-healthy foods include fruits, vegetables, lean meat (such as beef, chicken, or pork), and low-fat dairy products. Fatty fish such as salmon and tuna are also heart healthy. Other heart-healthy foods include walnuts, whole-grain breads, and legumes such as ferrell beans. Replace butter and margarine with heart-healthy oils such as olive oil or canola oil.  Your provider or a dietitian can help you create heart-healthy meal plans. Manage any chronic health conditions you have. These include high blood pressure, diabetes, obesity, high cholesterol, metabolic syndrome, and COPD. You will have fewer symptoms if you manage these health conditions. Follow your healthcare provider's recommendations and follow up with him or her regularly. Maintain a healthy weight. Being overweight can increase your risk for high blood pressure, diabetes, and coronary artery disease. These conditions can make your symptoms worse. Ask your healthcare provider what a healthy weight is for you. Ask him or her to help you create a weight loss plan, if needed. Stay active. Activity can help keep your symptoms from getting worse. Walking is a type of physical activity that helps maintain your strength and improve your mood. Physical activity also helps you manage your weight. Work with your healthcare provider to create an exercise plan that is right for you. Get vaccines as directed. The flu, COVID-19, and pneumonia can be severe for a person who has heart failure. Vaccines protect you from these infections. Get a flu vaccine every year as soon as it is recommended, usually in September or October. Get a COVID-19 vaccine and booster as directed. You may also need the pneumonia vaccine. Your healthcare provider can tell you if you need other vaccines, and when to get them. Follow up with your doctor or cardiologist within 7 days or as directed: You may need to return for other tests. You may need home health care. A healthcare provider will monitor your vital signs, weight, and make sure your medicines are working. Write down your questions so you remember to ask them during your visits. For support or more information:  Heart failure can be difficult to manage. It may be helpful to talk with others who have heart failure. You may learn how to better manage your condition or get emotional support.  For more information:  [address] [facility],[address], [address]   Phone: [phone]  Web Address: [email or web address]/. org     © Copyright Aliza Ricardo 2022 Information is for End User's use only and may not be sold, redistributed or otherwise used for commercial purposes. The above information is an  only. It is not intended as medical advice for individual conditions or treatments. Talk to your doctor, nurse or pharmacist before following any medical regimen to see if it is safe and effective for you.

## 2023-08-30 NOTE — ASSESSMENT & PLAN NOTE
Lab Results   Component Value Date    HGBA1C 8.8 (H) 08/19/2023   Awaiting repeat A1c on Humalog 6 units with each meal and Lantus 15 with Xigduo XR 5/1000 mg daily GFR 60 creatinine 1.0 stable at baseline avoid nephrotoxic

## 2023-09-03 NOTE — ASSESSMENT & PLAN NOTE
Wt Readings from Last 3 Encounters:   08/30/23 77.1 kg (170 lb)   06/21/23 75.8 kg (167 lb)   05/31/23 75.7 kg (166 lb 12.8 oz)     Continue low-salt diet fluid restriction Daily weight monitoring CHF compensated patient euvolemic continue Lasix 20 mg daily with an extra follow the cardiology last echocardiogram ejection fraction improved to 45 to 50%

## 2023-09-03 NOTE — PROGRESS NOTES
Dr. Darshan Anderson Office Visit Note  23     Alex Wu 61 y.o. female MRN: [id number]  : 1960    Assessment:     1. Uncontrolled type 2 diabetes mellitus with hyperglycemia (HCC)  Assessment & Plan:    Lab Results   Component Value Date    HGBA1C 8.8 (H) 2023   Last A1c 7.5 now much better continue current regimen with Lantus and Xigduo diabetic diet home blood sugar monitoring hypoglycemia protocol in place diabetic diet continue to Lantus and Humalog    Orders:  -     Insulin Pen Needle (BD Pen Needle Megan 2nd Gen) 32G X 4 MM MISC; For use with insulin pen. Pharmacy may dispense brand covered by insurance. Use four times daily.  -     Hemoglobin A1C; Future; Expected date: 2023  -     Comprehensive metabolic panel; Future; Expected date: 2023  -     Albumin / creatinine urine ratio; Future; Expected date: 2023  -     Lipid Panel with Direct LDL reflex; Future; Expected date: 2023    2. Anxiety  -     ALPRAZolam (XANAX) 0.5 mg tablet; Take 1 tablet (0.5 mg total) by mouth daily as needed for anxiety    3. Type 2 diabetes mellitus with stage 3b chronic kidney disease, with long-term current use of insulin (HCC)  Assessment & Plan:    Lab Results   Component Value Date    HGBA1C 8.8 (H) 2023   Awaiting repeat A1c on Humalog 6 units with each meal and Lantus 15 with Xigduo XR 1000 mg daily GFR 60 creatinine 1.0 stable at baseline avoid nephrotoxic    Orders:  -     Hemoglobin A1C; Future; Expected date: 2023  -     Comprehensive metabolic panel; Future; Expected date: 2023  -     Albumin / creatinine urine ratio; Future; Expected date: 2023  -     Lipid Panel with Direct LDL reflex; Future; Expected date: 2023    4. 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  Ventricular tachycardia ([address])  Assessment & Plan:  Finished the and outpatient monitoring reviewed stable continue metoprolol no evidence of any sustained ventricular tachycardia ejection fraction improving the last echocardiogram reviewed follow-up with cardiology      6. Chronic combined systolic and diastolic CHF (congestive heart failure) (HCC)  Assessment & Plan:  Wt Readings from Last 3 Encounters:   08/30/23 77.1 kg (170 lb)   06/21/23 75.8 kg (167 lb)   05/31/23 75.7 kg (166 lb 12.8 oz)     Continue low-salt diet fluid restriction Daily weight monitoring CHF compensated patient euvolemic continue Lasix 20 mg daily with an extra follow the cardiology last echocardiogram ejection fraction improved to 45 to 50%          7. Cardiomyopathy, dilated [facility])  Assessment & Plan:  Evaluated by cardiology ejection fraction about 45 to 50% patient euvolemic CHF compensated continue Lasix metoprolol possibly due to viral cardiomyopathy    Orders:  -     Hemoglobin A1C; Future; Expected date: 08/30/2023  -     Comprehensive metabolic panel; Future; Expected date: 08/30/2023  -     Albumin / creatinine urine ratio; Future; Expected date: 08/30/2023  -     Lipid Panel with Direct LDL reflex; Future; Expected date: 08/30/2023    8. Hyperlipidemia, mixed  -     Lipid Panel with Direct LDL reflex; Future; Expected date: 08/30/2023          Discussion Summary and Plan: Today's care plan and medications were reviewed with patient in detail and all their questions answered to their satisfaction. No chief complaint on file.      Subjective:  Patient came in follow-up chronic medical condition listed under visit diagnosis monitoring blood sugar at home which seems to be improving awaiting repeat A1c BMP urine for microalbumin lipid profile patient CHF compensated on Lasix    Fluid restriction Daily weight monitoring followed by cardiology continue same regimen for diabetes denies any chest pain difficulty breathing      The following portions of the patient's history were reviewed and updated as appropriate: allergies, current medications, past family history, past medical history, past social history, past surgical history and problem list.    Review of Systems   Constitutional: Positive for fatigue. Negative for activity change, appetite change, chills, diaphoresis, fever and unexpected weight change. HENT: Negative for congestion, dental problem, drooling, ear discharge, ear pain, facial swelling, hearing loss, mouth sores, nosebleeds, postnasal drip, rhinorrhea, sinus pressure, sneezing, sore throat, tinnitus, trouble swallowing and voice change. Eyes: Negative for photophobia, pain, discharge, redness, itching and visual disturbance. Respiratory: Positive for shortness of breath. Negative for apnea, cough, choking, chest tightness, wheezing and stridor. Cardiovascular: Positive for palpitations. Negative for chest pain and leg swelling. Gastrointestinal: Negative for abdominal distention, abdominal pain, anal bleeding, blood in stool, constipation, diarrhea, nausea, rectal pain and vomiting. Endocrine: Negative for cold intolerance, heat intolerance, polydipsia, polyphagia and polyuria. Genitourinary: Negative for decreased urine volume, difficulty urinating, dysuria, enuresis, flank pain, frequency, genital sores, hematuria and urgency. Musculoskeletal: Positive for arthralgias. Negative for back pain, gait problem, joint swelling, myalgias, neck pain and neck stiffness. Skin: Negative for color change, pallor, rash and wound. Allergic/Immunologic: Negative. Negative for environmental allergies, food allergies and immunocompromised state. Neurological: Negative for dizziness, tremors, seizures, syncope, facial asymmetry, speech difficulty, weakness, light-headedness, numbness and headaches. Psychiatric/Behavioral: Negative for agitation, behavioral problems, confusion, decreased concentration, dysphoric mood, hallucinations, self-injury, sleep disturbance and suicidal ideas. The patient is not nervous/anxious and is not hyperactive.           Historical Information   Patient Active Problem List Diagnosis   • Acquired deformity of foot   • Pain in both feet   • Congenital pes planus of right foot   • Congenital pes planus of left foot   • Hammer toe of left foot   • Harvey's neuroma of second interspace of right foot   • Right foot pain   • Hammer toe of right foot   • Word finding difficulty   • Uncontrolled type 2 diabetes mellitus with hyperglycemia (HCC)   • Hypertensive urgency   • Hyponatremia in the setting of hyperglycemia   • Prolonged QT interval   • HLD (hyperlipidemia)   • Nonsustained paroxysmal ventricular tachycardia   • Essential hypertension   • Blurry vision   • Diarrhea   • Type 2 diabetes mellitus treated with insulin ([address])   • Overweight (BMI 25.0-29. 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)   • Deep vein thrombosis (DVT) of left lower extremity ([facility])   • Chest pain   • Acute respiratory failure with hypoxia ([facility])   • Acute on chronic combined systolic and diastolic CHF (congestive heart failure) ([facility])   • Anemia, unspecified type   • Ventricular tachycardia (HCC)   • Stage 3b chronic kidney disease (HCC)   • Type 2 diabetes mellitus with stage 3b chronic kidney disease ([address])   • Cardiomyopathy, dilated (HCC)   • Chronic combined systolic and diastolic CHF (congestive heart failure) ([address])   • Anxiety     Past Medical History:   Diagnosis Date   • Abdominal pain    • Anxiety disorder    • Diabetes mellitus ([address])    • Dizziness    • Heart failure (HCC)    • High cholesterol    • Hypertension    • Lightheadedness    • Palpitations    • SOB (shortness of breath)      No past surgical history on file. Social History     Substance and Sexual Activity   Alcohol Use Yes    Comment: social     Social History     Substance and Sexual Activity   Drug Use Never     Social History     Tobacco Use   Smoking Status Former   Smokeless Tobacco Former     No family history on file.   Health Maintenance Due   Topic   • Hepatitis C Screening    • COVID-19 Vaccine (1)   • Pneumococcal Vaccine: Pediatrics (0 to 5 Years) and At-Risk Patients (6 to 59 Years) (1 - PCV)   • DM Eye Exam    • HIV Screening    • Annual Physical    • DTaP,Tdap,and Td Vaccines (1 - Tdap)   • Cervical Cancer Screening    • Breast Cancer Screening: Mammogram    • Colorectal Cancer Screening    • Influenza Vaccine (1)      Meds/Allergies       Current Outpatient Medications:   •  Alcohol Swabs 70 % PADS, May substitute brand based on insurance coverage. Check glucose TID., Disp: 100 each, Rfl: 0  •  ALPRAZolam (XANAX) 0.5 mg tablet, Take 1 tablet (0.5 mg total) by mouth daily as needed for anxiety, Disp: 30 tablet, Rfl: 1  •  atorvastatin (LIPITOR) 80 mg tablet, Take 1 tablet (80 mg total) by mouth every evening, Disp: 30 tablet, Rfl: 5  •  furosemide (LASIX) 40 mg tablet, Take 0.5 tablets (20 mg total) by mouth daily, Disp: 30 tablet, Rfl: 0  •  glucose blood (Contour Next Test) test strip, Use 1 each 3 (three) times a day Use as instructed, Disp: , Rfl:   •  glucose blood (FREESTYLE TEST STRIPS) test strip, May substitute brand based on insurance coverage. Check glucose TID., Disp: 100 each, Rfl: 0  •  glucose monitoring kit (FREESTYLE) monitoring kit, May substitute brand based on insurance coverage. Check glucose TID., Disp: 1 each, Rfl: 0  •  HumaLOG KwikPen 100 units/mL injection pen, INJECT 6 UNITS 3 TIMES A DAY BY SUBCUTANEOUS ROUTE BEFORE MEALS., Disp: 15 mL, Rfl: 3  •  Insulin Pen Needle (BD Pen Needle Megan 2nd Gen) 32G X 4 MM MISC, For use with insulin pen. Pharmacy may dispense brand covered by insurance., Disp: 100 each, Rfl: 0  •  Insulin Pen Needle (BD Pen Needle Megan 2nd Gen) 32G X 4 MM MISC, For use with insulin pen. Pharmacy may dispense brand covered by insurance. Use four times daily. , Disp: 100 each, Rfl: 5  •  Lantus SoloStar 100 units/mL injection pen, INJECT 15 UNITS UNDER THE SKIN DAILY AT BEDTIME, Disp: 15 mL, Rfl: 3  •  metoprolol succinate (TOPROL-XL) 50 mg 24 hr tablet, Take 1 tablet (50 mg total) by mouth daily In the morning Do not start before February 1, 2023. (Patient taking differently: Take 75 mg by mouth 2 (two) times a day In the morning), Disp: 30 tablet, Rfl: 0  •  Microlet Lancets MISC, USE TO TEST 3 TIMES A DAY, Disp: 100 each, Rfl: 10  •  nitroglycerin (NITROSTAT) 0.4 mg SL tablet, Place 0.4 mg under the tongue as needed, Disp: , Rfl:   •  potassium chloride (K-DUR,KLOR-CON) 20 mEq tablet, Take 1 tablet (20 mEq total) by mouth daily Do not start before February 1, 2023., Disp: 14 tablet, Rfl: 0  •  sacubitril-valsartan (Entresto) 49-51 MG TABS, Take 1 tablet by mouth 2 (two) times a day, Disp: , Rfl:   •  aspirin 81 MG tablet, Take 81 mg by mouth daily, Disp: , Rfl:   •  glucose monitoring kit (FREESTYLE) monitoring kit, May substitute brand based on insurance coverage. Check glucose TID., Disp: 1 each, Rfl: 0  •  nitroglycerin (NITROSTAT) 0.4 mg SL tablet, Place 0.4 mg under the tongue as needed, Disp: , Rfl:   •  Xigduo XR 5-1000 MG TB24, Take 1 tablet by mouth in the morning, Disp: 30 tablet, Rfl: 5      Objective:    Vitals:   /80   Pulse 74   Temp 98 °F (36.7 °C)   Resp 16   Ht 5' 4" (1.626 m)   Wt 77.1 kg (170 lb)   SpO2 97%   BMI 29.18 kg/m²   Body mass index is 29.18 kg/m². Vitals:    08/30/23 1718   Weight: 77.1 kg (170 lb)       Physical Exam  Vitals and nursing note reviewed. Constitutional:       General: She is not in acute distress. Appearance: She is well-developed. She is not ill-appearing, toxic-appearing or diaphoretic. HENT:      Head: Normocephalic and atraumatic. Right Ear: External ear normal.      Left Ear: External ear normal.      Nose: Nose normal.      Mouth/Throat:      Pharynx: No oropharyngeal exudate. Eyes:      General: Lids are normal. Lids are everted, no foreign bodies appreciated. No scleral icterus. Right eye: No discharge. Left eye: No discharge. Conjunctiva/sclera: Conjunctivae normal.      Pupils: Pupils are equal, round, and reactive to light. Neck:      Thyroid: No thyromegaly. Vascular: Normal carotid pulses. No carotid bruit, hepatojugular reflux or JVD. Trachea: No tracheal tenderness or tracheal deviation. Cardiovascular:      Rate and Rhythm: Normal rate and regular rhythm. Pulses: Normal pulses. Heart sounds: No murmur heard. No friction rub. Gallop present. Pulmonary:      Effort: Pulmonary effort is normal. No respiratory distress. Breath sounds: No stridor. Rales present. No wheezing. Chest:      Chest wall: No tenderness. Abdominal:      General: Bowel sounds are normal. There is no distension. Palpations: Abdomen is soft. There is no mass. Tenderness: There is no abdominal tenderness. There is no guarding or rebound. Musculoskeletal:         General: No tenderness or deformity. Normal range of motion. Cervical back: Normal range of motion and neck supple. No edema, erythema or rigidity. No spinous process tenderness or muscular tenderness. Normal range of motion. Lymphadenopathy:      Head:      Right side of head: No submental, submandibular, tonsillar, preauricular or posterior auricular adenopathy. Left side of head: No submental, submandibular, tonsillar, preauricular, posterior auricular or occipital adenopathy. Cervical: No cervical adenopathy. Right cervical: No superficial, deep or posterior cervical adenopathy. Left cervical: No superficial, deep or posterior cervical adenopathy. Upper Body:      Right upper body: No pectoral adenopathy. Left upper body: No pectoral adenopathy. Skin:     General: Skin is warm and dry. Coloration: Skin is not pale. Findings: No erythema or rash. Neurological:      General: No focal deficit present. Mental Status: She is alert and oriented to person, place, and time. Cranial Nerves: No cranial nerve deficit. Sensory: No sensory deficit. Motor: No tremor, abnormal muscle tone or seizure activity. Coordination: Coordination normal.      Gait: Gait normal.      Deep Tendon Reflexes: Reflexes are normal and symmetric. Reflexes normal.   Psychiatric:         Behavior: Behavior normal.         Thought Content: Thought content normal.         Judgment: Judgment normal.         Lab Review   No visits with results within 2 Month(s) from this visit. Latest known visit with results is:   No results displayed because visit has over 200 results. Patient Instructions   Heart Failure   AMBULATORY CARE:   Heart failure  is a condition that does not allow your heart to fill or pump properly. Not enough oxygen in your blood gets to your organs and tissues. Fluid may not move through your body properly. Fluid may build up and cause swelling and trouble breathing. This is known as congestive heart failure. It is important to manage your health to improve your quality of life. Signs and symptoms  depend on the type of heart failure you have and how severe it is.  You may have any of the following:  Trouble breathing with activity that worsens to trouble breathing at rest    Shortness of breath while lying flat    Severe shortness of breath and coughing at night that usually wakes you    Feeling lightheaded when you stand up    Purple color around your mouth and nails    Confusion or anxiety    Chest pain at night    Periods of no breathing, then breathing fast    Lack of energy (often worsened by physical activity), or trouble sleeping    Swelling in your ankles, legs, or abdomen    Heartbeat that is fast or not regular    Fingers and toes feel cool to the touch    Call your local emergency number (911 in the [address]) if:   You have any of the following signs of a heart attack:      Squeezing, pressure, or pain in your chest    You may  also have any of the following:     Discomfort or pain in your back, neck, jaw, stomach, or arm    Shortness of breath    Nausea or vomiting    Lightheadedness or a sudden cold sweat      Seek immediate care if:   Your heartbeat is fast, slow, or uneven all the time. Call your doctor if:   You have symptoms of worsening heart failure:      Shortness of breath at rest, at night, or that is getting worse in any way    Weight gain of 3 or more pounds (1.4 kg) in a day, or more than your healthcare provider says is okay    More swelling in your legs or ankles    Abdominal pain or swelling    More coughing    Loss of appetite    Feeling tired all the time    You feel depressed, or you have lost interest in things you used to enjoy. You often feel worried or afraid. You have questions or concerns about your condition or care. Treatment:  Heart failure is often caused by damage or injury to your heart. The damage may be caused by other heart problems, diabetes, or high blood pressure. The damage may have also been caused by an infection. Your healthcare providers will help you manage any other health conditions that may be causing your heart failure. The goals of treatment are to manage, slow, or reverse heart damage. Treatment may include any of the following:  Medicines  may be needed to help regulate your heart rhythm. You may also need medicines to lower your blood pressure, and to decrease extra fluids. Oxygen  may help you breathe easier if your oxygen level is lower than normal. A CPAP machine may be used to keep your airway open while you sleep. Cardiac rehab  is a program run by specialists who will help you safely strengthen your heart. In the program you will learn about exercise, relaxation, stress management, and heart-healthy nutrition. Cardiac rehab may be recommended if your heart failure is not severe.     Surgery  can be done to implant a pacemaker or another device in your chest to regulate your heart rhythm. Other types of surgery can open blocked heart vessels, replace a damaged heart valve, or remove scar tissue. Manage swelling from extra fluid:   Elevate (raise) your legs above the level of your heart. This will help with fluid that builds up in your legs or ankles. Elevate your legs as often as possible during the day. Prop your legs on pillows or blankets to keep them elevated comfortably. Try not to stand for long periods of time during the day. Move around to keep your blood circulating. Limit sodium (salt). Ask how much sodium you can have each day. Your healthcare provider may give you a limit, such as 2,300 milligrams (mg) a day. Your provider or a dietitian can teach you how to read food labels for the number of mg in a food. He or she can also help you find ways to have less salt. For example, if you add salt to food as you cook, do not add more at the table. Drink liquids as directed. You may need to limit the amount of liquid you drink within 24 hours. Your healthcare provider will tell you how much liquid to have and which liquids are best for you. He or she may tell you to limit liquid to 1.5 to 2 liters in a day. He or she will also tell you how often to drink liquid throughout the day. Weigh yourself every morning. Use the same scale, in the same spot. Do this after you use the bathroom, but before you eat or drink. Wear the same type of clothing each time. Write down your weight and call your healthcare provider if you have a sudden weight gain. Swelling and weight gain are signs of fluid buildup. Manage heart failure: Your quality of life may improve with treatment and the following:  Do not smoke. Nicotine and other chemicals in cigarettes and cigars can cause lung and heart damage. Ask your healthcare provider for information if you currently smoke and need help to quit. E-cigarettes or smokeless tobacco still contain nicotine.  Talk to your healthcare provider before you use these products. Do not drink alcohol or use illegal drugs. Alcohol and drugs can increase your risk for high blood pressure, diabetes, and coronary artery disease. Eat heart-healthy foods. Heart-healthy foods include fruits, vegetables, lean meat (such as beef, chicken, or pork), and low-fat dairy products. Fatty fish such as salmon and tuna are also heart healthy. Other heart-healthy foods include walnuts, whole-grain breads, and legumes such as ferrell beans. Replace butter and margarine with heart-healthy oils such as olive oil or canola oil. Your provider or a dietitian can help you create heart-healthy meal plans. Manage any chronic health conditions you have. These include high blood pressure, diabetes, obesity, high cholesterol, metabolic syndrome, and COPD. You will have fewer symptoms if you manage these health conditions. Follow your healthcare provider's recommendations and follow up with him or her regularly. Maintain a healthy weight. Being overweight can increase your risk for high blood pressure, diabetes, and coronary artery disease. These conditions can make your symptoms worse. Ask your healthcare provider what a healthy weight is for you. Ask him or her to help you create a weight loss plan, if needed. Stay active. Activity can help keep your symptoms from getting worse. Walking is a type of physical activity that helps maintain your strength and improve your mood. Physical activity also helps you manage your weight. Work with your healthcare provider to create an exercise plan that is right for you. Get vaccines as directed. The flu, COVID-19, and pneumonia can be severe for a person who has heart failure. Vaccines protect you from these infections. Get a flu vaccine every year as soon as it is recommended, usually in September or October. Get a COVID-19 vaccine and booster as directed. You may also need the pneumonia vaccine.  Your healthcare provider can tell you if you need other vaccines, and when to get them. Follow up with your doctor or cardiologist within 7 days or as directed: You may need to return for other tests. You may need home health care. A healthcare provider will monitor your vital signs, weight, and make sure your medicines are working. Write down your questions so you remember to ask them during your visits. For support or more information:  Heart failure can be difficult to manage. It may be helpful to talk with others who have heart failure. You may learn how to better manage your condition or get emotional support. For more information:  [facility],[address], [address]   Phone: [phone]  Web Address: [email or web address].com/. org     © Copyright Cecelia Hunt 2022 Information is for End User's use only and may not be sold, redistributed or otherwise used for commercial purposes. The above information is an  only. It is not intended as medical advice for individual conditions or treatments. Talk to your doctor, nurse or pharmacist before following any medical regimen to see if it is safe and effective for you. Rosibel Nye MD        "This note has been constructed using a voice recognition system. Therefore there may be syntax, spelling, and/or grammatical errors.  Please call if you have any questions. "

## 2023-09-03 NOTE — ASSESSMENT & PLAN NOTE
Finished the and outpatient monitoring reviewed stable continue metoprolol no evidence of any sustained ventricular tachycardia ejection fraction improving the last echocardiogram reviewed follow-up with cardiology

## 2023-09-03 NOTE — ASSESSMENT & PLAN NOTE
Evaluated by cardiology ejection fraction about 45 to 50% patient euvolemic CHF compensated continue Lasix metoprolol possibly due to viral cardiomyopathy

## 2023-09-03 NOTE — ASSESSMENT & PLAN NOTE
Lab Results   Component Value Date    HGBA1C 8.8 (H) 08/19/2023   Last A1c 7.5 now much better continue current regimen with Lantus and Xigduo diabetic diet home blood sugar monitoring hypoglycemia protocol in place diabetic diet continue to Lantus and Humalog

## 2023-09-18 ENCOUNTER — TELEPHONE (OUTPATIENT)
Dept: INTERNAL MEDICINE CLINIC | Facility: CLINIC | Age: 63
End: 2023-09-18

## 2023-09-18 ENCOUNTER — TELEPHONE (OUTPATIENT)
Age: 63
End: 2023-09-18

## 2023-09-18 NOTE — TELEPHONE ENCOUNTER
Patient had terrible leg cramps last night for about 2 hours straight, woke her up at night. Today her legs are very sore. She stated it was in her thighs, calf and ankles. She has had "charley horses" now and then but nothing like this. I offered her an appointment, however she wanted to send the message to her PCP first and if he wanted to see her she would come in.

## 2023-09-18 NOTE — TELEPHONE ENCOUNTER
Called patient about her leg cramps left message. To get lab work done [facility] and Dr. Ashia Cespedes ordered her mirapex also.

## 2023-10-17 NOTE — ED PROVIDER NOTES
History  Chief Complaint   Patient presents with    Cardiac Arrest     58-year-old female presented as a prearrival cardiac arrest.  EMS stating that family found her in bed unresponsive. There was unknown downtime. The fire department started CPR immediately on arrival.  When EMS arrived, they intubated the patient and placed her on the [employer] [facility] machine and had given 2 rounds of epinephrine prior to arrival.  When patient arrived, she was met immediately by myself, nursing staff and respiratory. Patient has remained asystolic by EMS and continued so in the emergency department        Prior to Admission Medications   Prescriptions Last Dose Informant Patient Reported? Taking? ALPRAZolam (XANAX) 0.5 mg tablet   No No   Sig: Take 1 tablet (0.5 mg total) by mouth daily as needed for anxiety   Alcohol Swabs 70 % PADS   No No   Sig: May substitute brand based on insurance coverage. Check glucose TID. HumaLOG KwikPen 100 units/mL injection pen   No No   Sig: INJECT 6 UNITS 3 TIMES A DAY BY SUBCUTANEOUS ROUTE BEFORE MEALS. Insulin Pen Needle (BD Pen Needle Megan 2nd Gen) 32G X 4 MM MISC   No No   Sig: For use with insulin pen. Pharmacy may dispense brand covered by insurance. Insulin Pen Needle (BD Pen Needle Megan 2nd Gen) 32G X 4 MM MISC   No No   Sig: For use with insulin pen. Pharmacy may dispense brand covered by insurance. Use four times daily.    Lantus SoloStar 100 units/mL injection pen   No No   Sig: INJECT 15 UNITS UNDER THE SKIN DAILY AT BEDTIME   Microlet Lancets MISC   No No   Sig: USE TO TEST 3 TIMES A DAY   Xigduo XR 5-1000 MG TB24   No No   Sig: Take 1 tablet by mouth in the morning   aspirin 81 MG tablet   Yes No   Sig: Take 81 mg by mouth daily   atorvastatin (LIPITOR) 80 mg tablet   No No   Sig: Take 1 tablet (80 mg total) by mouth every evening   furosemide (LASIX) 40 mg tablet   No No   Sig: Take 0.5 tablets (20 mg total) by mouth daily   glucose blood (Contour Next Test) test strip   Yes No Sig: Use 1 each 3 (three) times a day Use as instructed   glucose blood (FREESTYLE TEST STRIPS) test strip   No No   Sig: May substitute brand based on insurance coverage. Check glucose TID. glucose monitoring kit (FREESTYLE) monitoring kit   No No   Sig: May substitute brand based on insurance coverage. Check glucose TID. metoprolol succinate (TOPROL-XL) 50 mg 24 hr tablet   No No   Sig: Take 1 tablet (50 mg total) by mouth daily In the morning Do not start before February 1, 2023. Patient taking differently: Take 75 mg by mouth 2 (two) times a day In the morning   nitroglycerin (NITROSTAT) 0.4 mg SL tablet   Yes No   Sig: Place 0.4 mg under the tongue as needed   potassium chloride (K-DUR,KLOR-CON) 20 mEq tablet   No No   Sig: Take 1 tablet (20 mEq total) by mouth daily Do not start before February 1, 2023. pramipexole (MIRAPEX) 0.25 mg tablet   No No   Sig: Take 1 tablet (0.25 mg total) by mouth 3 (three) times a day   sacubitril-valsartan (Entresto) 49-51 MG TABS   Yes No   Sig: Take 1 tablet by mouth 2 (two) times a day      Facility-Administered Medications: None       Past Medical History:   Diagnosis Date    Abdominal pain     Anxiety disorder     Diabetes mellitus (HCC)     Dizziness     Heart failure (HCC)     High cholesterol     Hypertension     Lightheadedness     Palpitations     SOB (shortness of breath)        No past surgical history on file. No family history on file. I have reviewed and agree with the history as documented.     E-Cigarette/Vaping    E-Cigarette Use Never User      E-Cigarette/Vaping Substances    Nicotine No     THC No     CBD No     Flavoring No     Other No     Unknown No      Social History     Tobacco Use    Smoking status: Former    Smokeless tobacco: Former   Vaping Use    Vaping Use: Never used   Substance Use Topics    Alcohol use: Yes     Comment: social    Drug use: Never       Review of Systems   Unable to perform ROS: Patient unresponsive       Physical Exam  Physical Exam  Constitutional:       Appearance: She is ill-appearing. HENT:      Head: Normocephalic and atraumatic. Mouth/Throat:      Mouth: Mucous membranes are moist.   Eyes:      Comments: Fixed and dilated    Cardiovascular:      Comments: No pulses palpated   Pulmonary:      Comments: No respiratory drive. Rhonchorus breath sounds with bagging. Frothy sputum in ET tube  Abdominal:      Palpations: Abdomen is soft. Musculoskeletal:         General: No deformity. Skin:     Comments: Cool and mottling    Neurological:      Comments: GCS 3         Vital Signs  ED Triage Vitals   Temp Pulse Resp BP SpO2   -- -- -- -- --      Temp src Heart Rate Source Patient Position - Orthostatic VS BP Location FiO2 (%)   -- -- -- -- --      Pain Score       --           There were no vitals filed for this visit. Visual Acuity      ED Medications  Medications   EPINEPHrine (ADRENALIN) injection (1 mg Intravenous Given 10/17/23 0788)   sodium bicarbonate 50 mEq/50 mL injection (50 mEq Intravenous Given 10/17/23 0766)   dextrose 50 % IV solution (50 mL Intravenous Given 10/17/23 0766)       Diagnostic Studies  Results Reviewed       None                   No orders to display              Procedures  Procedures         ED Course                                             Medical Decision Making  59-year-old female presenting as a prearrival cardiac arrest.  EMS started CPR and had given 2 rounds of epinephrine and intubated the patient. Patient arrived on the [facility] machine and met by myself, nursing staff and respiratory. On arrival, patient remained in asystole. Patient was cold to touch with evidence of mottling. There was an unknown downtime. She was found in bed by family. On arrival, CPR was continued. Patient given several rounds of epinephrine. Staff was able to chart check and had noticed prior records indicating history of cardiomyopathy, diabetes, V. tach and CHF.   Patient given round of bicarb. Patient found to be hypoglycemic at 43 and given a full amp of dextrose. Multiple bedside echo performed by myself showed no cardiac activity. Patient remained in asystole the entire time. Patient showing evidence of pulmonary edema with frothy sputum in the ET tube. Bilateral lungs auscultated. No air auscultated in the stomach. Respiratory continuously suctioning the tube    Given the unknown downtime, mottled skin on arrival, fixed and dilated pupils and persistent asystole, the time of death was called at 7:42 AM        Update 8 AM: I spoke to family and updated her death. Family indicated that patient was complaining of flulike illness over the past few days but had tested negative for COVID        Problems Addressed:  Cardiac arrest [facility]): acute illness or injury that poses a threat to life or bodily functions     Details: Time of death called at 7:42 AM    Risk  Prescription drug management. Disposition  Final diagnoses:   None     ED Disposition       None          Follow-up Information    None         Patient's Medications   Discharge Prescriptions    No medications on file       No discharge procedures on file.     PDMP Review         Value Time User    PDMP Reviewed  Yes 8/30/2023  5:34 PM Kaushal Boone MD            ED Provider  Electronically Signed by             Henry Lopez DO  10/17/23 6791

## 2025-05-23 NOTE — ASSESSMENT & PLAN NOTE
Call to patient, updated with results/recommendations as below, patient verbalized understanding.  Aware to  new RX.   The work-up finding follow-up plan same as under ventricular tachycardia and this visit diagnosis